# Patient Record
Sex: FEMALE | Race: WHITE | Employment: UNEMPLOYED | URBAN - NONMETROPOLITAN AREA
[De-identification: names, ages, dates, MRNs, and addresses within clinical notes are randomized per-mention and may not be internally consistent; named-entity substitution may affect disease eponyms.]

---

## 2017-02-17 ENCOUNTER — OFFICE VISIT (OUTPATIENT)
Dept: FAMILY MEDICINE CLINIC | Age: 37
End: 2017-02-17

## 2017-02-17 VITALS
BODY MASS INDEX: 43.87 KG/M2 | SYSTOLIC BLOOD PRESSURE: 138 MMHG | WEIGHT: 257 LBS | DIASTOLIC BLOOD PRESSURE: 78 MMHG | OXYGEN SATURATION: 98 % | HEART RATE: 81 BPM | HEIGHT: 64 IN

## 2017-02-17 DIAGNOSIS — F33.1 MDD (MAJOR DEPRESSIVE DISORDER), RECURRENT EPISODE, MODERATE (HCC): Primary | Chronic | ICD-10-CM

## 2017-02-17 PROCEDURE — 99213 OFFICE O/P EST LOW 20 MIN: CPT | Performed by: FAMILY MEDICINE

## 2017-02-18 RX ORDER — OLANZAPINE 5 MG/1
2.5 TABLET ORAL NIGHTLY
Qty: 30 TABLET | Refills: 1 | Status: SHIPPED
Start: 2017-02-18 | End: 2017-05-08 | Stop reason: ALTCHOICE

## 2017-03-31 ENCOUNTER — OFFICE VISIT (OUTPATIENT)
Dept: FAMILY MEDICINE CLINIC | Age: 37
End: 2017-03-31

## 2017-03-31 VITALS
DIASTOLIC BLOOD PRESSURE: 88 MMHG | OXYGEN SATURATION: 98 % | HEART RATE: 82 BPM | BODY MASS INDEX: 43.71 KG/M2 | HEIGHT: 64 IN | SYSTOLIC BLOOD PRESSURE: 122 MMHG | WEIGHT: 256 LBS

## 2017-03-31 DIAGNOSIS — F33.1 MDD (MAJOR DEPRESSIVE DISORDER), RECURRENT EPISODE, MODERATE (HCC): Primary | Chronic | ICD-10-CM

## 2017-03-31 PROCEDURE — 99214 OFFICE O/P EST MOD 30 MIN: CPT | Performed by: FAMILY MEDICINE

## 2017-03-31 PROCEDURE — 36415 COLL VENOUS BLD VENIPUNCTURE: CPT | Performed by: FAMILY MEDICINE

## 2017-03-31 RX ORDER — OLANZAPINE 2.5 MG/1
1.25 TABLET ORAL NIGHTLY
Qty: 30 TABLET | Refills: 1 | Status: SHIPPED | OUTPATIENT
Start: 2017-03-31 | End: 2017-05-08 | Stop reason: ALTCHOICE

## 2017-04-01 LAB
LITHIUM DOSE AMOUNT: ABNORMAL
LITHIUM LEVEL: 0.5 MMOL/L (ref 0.6–1.2)

## 2017-04-27 DIAGNOSIS — I10 ESSENTIAL HYPERTENSION: ICD-10-CM

## 2017-04-27 DIAGNOSIS — F33.1 MDD (MAJOR DEPRESSIVE DISORDER), RECURRENT EPISODE, MODERATE (HCC): ICD-10-CM

## 2017-04-28 DIAGNOSIS — F33.1 MDD (MAJOR DEPRESSIVE DISORDER), RECURRENT EPISODE, MODERATE (HCC): ICD-10-CM

## 2017-04-28 RX ORDER — CLONAZEPAM 0.5 MG/1
TABLET ORAL
Qty: 15 TABLET | Refills: 2 | Status: SHIPPED | OUTPATIENT
Start: 2017-04-28 | End: 2017-07-07 | Stop reason: SDUPTHER

## 2017-04-28 RX ORDER — METOPROLOL SUCCINATE 100 MG/1
TABLET, EXTENDED RELEASE ORAL
Qty: 30 TABLET | Refills: 2 | Status: SHIPPED | OUTPATIENT
Start: 2017-04-28 | End: 2017-07-07 | Stop reason: SDUPTHER

## 2017-04-28 RX ORDER — LAMOTRIGINE 200 MG/1
TABLET ORAL
Qty: 60 TABLET | Refills: 0 | Status: SHIPPED | OUTPATIENT
Start: 2017-04-28 | End: 2017-06-01 | Stop reason: SDUPTHER

## 2017-04-28 RX ORDER — ESCITALOPRAM OXALATE 20 MG/1
TABLET ORAL
Qty: 30 TABLET | Refills: 0 | Status: SHIPPED | OUTPATIENT
Start: 2017-04-28 | End: 2017-06-01 | Stop reason: SDUPTHER

## 2017-05-03 ENCOUNTER — NURSE ONLY (OUTPATIENT)
Dept: FAMILY MEDICINE CLINIC | Age: 37
End: 2017-05-03

## 2017-05-03 ENCOUNTER — TELEPHONE (OUTPATIENT)
Dept: FAMILY MEDICINE CLINIC | Age: 37
End: 2017-05-03

## 2017-05-03 DIAGNOSIS — F33.1 MDD (MAJOR DEPRESSIVE DISORDER), RECURRENT EPISODE, MODERATE (HCC): Primary | Chronic | ICD-10-CM

## 2017-05-03 LAB
ALBUMIN SERPL-MCNC: 4.4 G/DL (ref 3.4–5)
ANION GAP SERPL CALCULATED.3IONS-SCNC: 18 MMOL/L (ref 3–16)
BUN BLDV-MCNC: 11 MG/DL (ref 7–20)
CALCIUM SERPL-MCNC: 9.1 MG/DL (ref 8.3–10.6)
CHLORIDE BLD-SCNC: 99 MMOL/L (ref 99–110)
CO2: 23 MMOL/L (ref 21–32)
CREAT SERPL-MCNC: 1 MG/DL (ref 0.6–1.1)
GFR AFRICAN AMERICAN: >60
GFR NON-AFRICAN AMERICAN: >60
GLUCOSE BLD-MCNC: 101 MG/DL (ref 70–99)
LITHIUM DOSE AMOUNT: NORMAL
LITHIUM LEVEL: 1.1 MMOL/L (ref 0.6–1.2)
PHOSPHORUS: 3.2 MG/DL (ref 2.5–4.9)
POTASSIUM SERPL-SCNC: 4.3 MMOL/L (ref 3.5–5.1)
SODIUM BLD-SCNC: 140 MMOL/L (ref 136–145)
T4 FREE: 1.1 NG/DL (ref 0.9–1.8)
TSH REFLEX: 4.3 UIU/ML (ref 0.27–4.2)

## 2017-05-03 PROCEDURE — 36415 COLL VENOUS BLD VENIPUNCTURE: CPT | Performed by: FAMILY MEDICINE

## 2017-05-07 DIAGNOSIS — F33.1 MDD (MAJOR DEPRESSIVE DISORDER), RECURRENT EPISODE, MODERATE (HCC): ICD-10-CM

## 2017-05-07 RX ORDER — LITHIUM CARBONATE 300 MG/1
1200 TABLET, FILM COATED, EXTENDED RELEASE ORAL DAILY
Qty: 120 TABLET | Refills: 5
Start: 2017-05-07 | End: 2017-06-16 | Stop reason: CLARIF

## 2017-05-17 ENCOUNTER — TELEPHONE (OUTPATIENT)
Dept: FAMILY MEDICINE CLINIC | Age: 37
End: 2017-05-17

## 2017-05-18 ENCOUNTER — OFFICE VISIT (OUTPATIENT)
Dept: FAMILY MEDICINE CLINIC | Age: 37
End: 2017-05-18

## 2017-05-18 VITALS
OXYGEN SATURATION: 97 % | TEMPERATURE: 98 F | SYSTOLIC BLOOD PRESSURE: 112 MMHG | RESPIRATION RATE: 16 BRPM | BODY MASS INDEX: 42.74 KG/M2 | DIASTOLIC BLOOD PRESSURE: 92 MMHG | HEART RATE: 95 BPM | WEIGHT: 249 LBS

## 2017-05-18 DIAGNOSIS — T88.9XXA SIDE EFFECTS OF TREATMENT, INITIAL ENCOUNTER: Primary | ICD-10-CM

## 2017-05-18 PROCEDURE — 99213 OFFICE O/P EST LOW 20 MIN: CPT | Performed by: FAMILY MEDICINE

## 2017-05-18 ASSESSMENT — PATIENT HEALTH QUESTIONNAIRE - PHQ9
3. TROUBLE FALLING OR STAYING ASLEEP: 0
8. MOVING OR SPEAKING SO SLOWLY THAT OTHER PEOPLE COULD HAVE NOTICED. OR THE OPPOSITE, BEING SO FIGETY OR RESTLESS THAT YOU HAVE BEEN MOVING AROUND A LOT MORE THAN USUAL: 0
SUM OF ALL RESPONSES TO PHQ QUESTIONS 1-9: 5
1. LITTLE INTEREST OR PLEASURE IN DOING THINGS: 2
9. THOUGHTS THAT YOU WOULD BE BETTER OFF DEAD, OR OF HURTING YOURSELF: 0
SUM OF ALL RESPONSES TO PHQ9 QUESTIONS 1 & 2: 5
7. TROUBLE CONCENTRATING ON THINGS, SUCH AS READING THE NEWSPAPER OR WATCHING TELEVISION: 0
6. FEELING BAD ABOUT YOURSELF - OR THAT YOU ARE A FAILURE OR HAVE LET YOURSELF OR YOUR FAMILY DOWN: 0
5. POOR APPETITE OR OVEREATING: 0
4. FEELING TIRED OR HAVING LITTLE ENERGY: 0
2. FEELING DOWN, DEPRESSED OR HOPELESS: 3

## 2017-06-15 DIAGNOSIS — F33.1 MDD (MAJOR DEPRESSIVE DISORDER), RECURRENT EPISODE, MODERATE (HCC): ICD-10-CM

## 2017-06-15 RX ORDER — LITHIUM CARBONATE 300 MG/1
TABLET, FILM COATED, EXTENDED RELEASE ORAL
Qty: 90 TABLET | Refills: 2 | Status: SHIPPED | OUTPATIENT
Start: 2017-06-15 | End: 2017-06-22 | Stop reason: DRUGHIGH

## 2017-06-16 RX ORDER — LITHIUM CARBONATE 300 MG/1
TABLET, FILM COATED, EXTENDED RELEASE ORAL
Qty: 120 TABLET | Refills: 5 | Status: SHIPPED | OUTPATIENT
Start: 2017-06-16 | End: 2017-08-04 | Stop reason: SDUPTHER

## 2017-07-07 ENCOUNTER — OFFICE VISIT (OUTPATIENT)
Dept: FAMILY MEDICINE CLINIC | Age: 37
End: 2017-07-07

## 2017-07-07 VITALS
SYSTOLIC BLOOD PRESSURE: 124 MMHG | WEIGHT: 251 LBS | HEIGHT: 64 IN | BODY MASS INDEX: 42.85 KG/M2 | OXYGEN SATURATION: 97 % | DIASTOLIC BLOOD PRESSURE: 92 MMHG | HEART RATE: 93 BPM

## 2017-07-07 DIAGNOSIS — F33.1 MDD (MAJOR DEPRESSIVE DISORDER), RECURRENT EPISODE, MODERATE (HCC): Primary | Chronic | ICD-10-CM

## 2017-07-07 DIAGNOSIS — I10 ESSENTIAL HYPERTENSION: ICD-10-CM

## 2017-07-07 PROCEDURE — 99213 OFFICE O/P EST LOW 20 MIN: CPT | Performed by: FAMILY MEDICINE

## 2017-07-07 RX ORDER — CLONAZEPAM 0.5 MG/1
TABLET ORAL
Qty: 15 TABLET | Refills: 2 | Status: SHIPPED | OUTPATIENT
Start: 2017-07-07 | End: 2017-10-03 | Stop reason: SDUPTHER

## 2017-07-07 RX ORDER — METOPROLOL SUCCINATE 100 MG/1
TABLET, EXTENDED RELEASE ORAL
Qty: 30 TABLET | Refills: 2 | Status: SHIPPED | OUTPATIENT
Start: 2017-07-07 | End: 2017-11-09 | Stop reason: SDUPTHER

## 2017-07-07 RX ORDER — ARIPIPRAZOLE 10 MG/1
10 TABLET ORAL DAILY
Qty: 30 TABLET | Refills: 1 | Status: SHIPPED | OUTPATIENT
Start: 2017-07-07 | End: 2017-08-04 | Stop reason: SDUPTHER

## 2017-08-04 ENCOUNTER — OFFICE VISIT (OUTPATIENT)
Dept: FAMILY MEDICINE CLINIC | Age: 37
End: 2017-08-04

## 2017-08-04 VITALS
WEIGHT: 251 LBS | BODY MASS INDEX: 42.85 KG/M2 | SYSTOLIC BLOOD PRESSURE: 118 MMHG | HEIGHT: 64 IN | OXYGEN SATURATION: 94 % | HEART RATE: 86 BPM | DIASTOLIC BLOOD PRESSURE: 80 MMHG

## 2017-08-04 DIAGNOSIS — F33.1 MDD (MAJOR DEPRESSIVE DISORDER), RECURRENT EPISODE, MODERATE (HCC): Chronic | ICD-10-CM

## 2017-08-04 LAB
ALBUMIN SERPL-MCNC: 4.4 G/DL (ref 3.4–5)
ANION GAP SERPL CALCULATED.3IONS-SCNC: 14 MMOL/L (ref 3–16)
BUN BLDV-MCNC: 7 MG/DL (ref 7–20)
CALCIUM SERPL-MCNC: 10.1 MG/DL (ref 8.3–10.6)
CHLORIDE BLD-SCNC: 100 MMOL/L (ref 99–110)
CO2: 25 MMOL/L (ref 21–32)
CREAT SERPL-MCNC: 0.9 MG/DL (ref 0.6–1.1)
GFR AFRICAN AMERICAN: >60
GFR NON-AFRICAN AMERICAN: >60
GLUCOSE BLD-MCNC: 107 MG/DL (ref 70–99)
LITHIUM DOSE AMOUNT: NORMAL
LITHIUM LEVEL: 0.9 MMOL/L (ref 0.6–1.2)
PHOSPHORUS: 2.6 MG/DL (ref 2.5–4.9)
POTASSIUM SERPL-SCNC: 4.5 MMOL/L (ref 3.5–5.1)
SODIUM BLD-SCNC: 139 MMOL/L (ref 136–145)
TSH REFLEX: 3.05 UIU/ML (ref 0.27–4.2)

## 2017-08-04 PROCEDURE — 36415 COLL VENOUS BLD VENIPUNCTURE: CPT | Performed by: FAMILY MEDICINE

## 2017-08-04 PROCEDURE — 99213 OFFICE O/P EST LOW 20 MIN: CPT | Performed by: FAMILY MEDICINE

## 2017-08-04 RX ORDER — LITHIUM CARBONATE 300 MG/1
TABLET, FILM COATED, EXTENDED RELEASE ORAL
Qty: 90 TABLET | Refills: 3 | Status: SHIPPED | OUTPATIENT
Start: 2017-08-04 | End: 2018-02-09 | Stop reason: SDUPTHER

## 2017-08-04 RX ORDER — ESCITALOPRAM OXALATE 20 MG/1
TABLET ORAL
Qty: 30 TABLET | Refills: 3 | Status: SHIPPED | OUTPATIENT
Start: 2017-08-04 | End: 2018-02-09 | Stop reason: SDUPTHER

## 2017-08-04 RX ORDER — ARIPIPRAZOLE 10 MG/1
10 TABLET ORAL DAILY
Qty: 30 TABLET | Refills: 3 | Status: SHIPPED | OUTPATIENT
Start: 2017-08-04 | End: 2018-01-09 | Stop reason: SDUPTHER

## 2017-09-08 ENCOUNTER — OFFICE VISIT (OUTPATIENT)
Dept: FAMILY MEDICINE CLINIC | Age: 37
End: 2017-09-08

## 2017-09-08 VITALS
BODY MASS INDEX: 43.54 KG/M2 | SYSTOLIC BLOOD PRESSURE: 126 MMHG | HEIGHT: 64 IN | HEART RATE: 84 BPM | WEIGHT: 255 LBS | DIASTOLIC BLOOD PRESSURE: 86 MMHG | OXYGEN SATURATION: 96 %

## 2017-09-08 DIAGNOSIS — Z20.2 POSSIBLE EXPOSURE TO STD: ICD-10-CM

## 2017-09-08 DIAGNOSIS — R30.0 DYSURIA: ICD-10-CM

## 2017-09-08 DIAGNOSIS — F33.1 MDD (MAJOR DEPRESSIVE DISORDER), RECURRENT EPISODE, MODERATE (HCC): Primary | Chronic | ICD-10-CM

## 2017-09-08 LAB
BACTERIA: ABNORMAL /HPF
BILIRUBIN URINE: NEGATIVE
BLOOD, URINE: NEGATIVE
CLARITY: ABNORMAL
COLOR: YELLOW
EPITHELIAL CELLS, UA: 12 /HPF (ref 0–5)
GLUCOSE URINE: NEGATIVE MG/DL
HYALINE CASTS: 1 /LPF (ref 0–8)
KETONES, URINE: NEGATIVE MG/DL
LEUKOCYTE ESTERASE, URINE: ABNORMAL
MICROSCOPIC EXAMINATION: YES
NITRITE, URINE: NEGATIVE
PH UA: 6.5
PROTEIN UA: ABNORMAL MG/DL
RBC UA: 2 /HPF (ref 0–4)
SPECIFIC GRAVITY UA: 1.02
UROBILINOGEN, URINE: 0.2 E.U./DL
WBC UA: 20 /HPF (ref 0–5)

## 2017-09-08 PROCEDURE — 81001 URINALYSIS AUTO W/SCOPE: CPT | Performed by: FAMILY MEDICINE

## 2017-09-08 PROCEDURE — 99214 OFFICE O/P EST MOD 30 MIN: CPT | Performed by: FAMILY MEDICINE

## 2017-09-08 RX ORDER — LAMOTRIGINE 200 MG/1
TABLET ORAL
Qty: 60 TABLET | Refills: 2 | Status: SHIPPED | OUTPATIENT
Start: 2017-09-08 | End: 2018-01-09 | Stop reason: SDUPTHER

## 2017-09-10 LAB
ORGANISM: ABNORMAL
URINE CULTURE, ROUTINE: ABNORMAL

## 2017-09-11 LAB
C. TRACHOMATIS DNA ,URINE: NEGATIVE
HIV-1 AND HIV-2 ANTIBODIES: NORMAL
N. GONORRHOEAE DNA, URINE: NEGATIVE

## 2017-09-11 RX ORDER — CIPROFLOXACIN 250 MG/1
250 TABLET, FILM COATED ORAL 2 TIMES DAILY
Qty: 6 TABLET | Refills: 0 | Status: SHIPPED | OUTPATIENT
Start: 2017-09-11 | End: 2017-09-14

## 2017-09-20 ENCOUNTER — OFFICE VISIT (OUTPATIENT)
Dept: FAMILY MEDICINE CLINIC | Age: 37
End: 2017-09-20

## 2017-09-20 VITALS
RESPIRATION RATE: 16 BRPM | DIASTOLIC BLOOD PRESSURE: 84 MMHG | BODY MASS INDEX: 43.94 KG/M2 | WEIGHT: 256 LBS | HEART RATE: 86 BPM | SYSTOLIC BLOOD PRESSURE: 112 MMHG | OXYGEN SATURATION: 98 % | TEMPERATURE: 98.4 F

## 2017-09-20 DIAGNOSIS — B37.31 VULVOVAGINITIS DUE TO YEAST: Primary | ICD-10-CM

## 2017-09-20 PROCEDURE — 99213 OFFICE O/P EST LOW 20 MIN: CPT | Performed by: FAMILY MEDICINE

## 2017-09-20 RX ORDER — FLUCONAZOLE 150 MG/1
150 TABLET ORAL ONCE
Qty: 1 TABLET | Refills: 0 | Status: SHIPPED | OUTPATIENT
Start: 2017-09-20 | End: 2017-09-20

## 2017-09-30 ASSESSMENT — ENCOUNTER SYMPTOMS: GASTROINTESTINAL NEGATIVE: 1

## 2017-10-03 DIAGNOSIS — F33.1 MDD (MAJOR DEPRESSIVE DISORDER), RECURRENT EPISODE, MODERATE (HCC): Chronic | ICD-10-CM

## 2017-10-03 RX ORDER — CLONAZEPAM 0.5 MG/1
0.5 TABLET ORAL 2 TIMES DAILY PRN
Qty: 15 TABLET | Refills: 2 | Status: SHIPPED | OUTPATIENT
Start: 2017-10-03 | End: 2018-01-09 | Stop reason: SDUPTHER

## 2017-11-09 ENCOUNTER — OFFICE VISIT (OUTPATIENT)
Dept: FAMILY MEDICINE CLINIC | Age: 37
End: 2017-11-09

## 2017-11-09 VITALS
HEART RATE: 97 BPM | BODY MASS INDEX: 44.39 KG/M2 | HEIGHT: 64 IN | WEIGHT: 260 LBS | OXYGEN SATURATION: 97 % | SYSTOLIC BLOOD PRESSURE: 122 MMHG | DIASTOLIC BLOOD PRESSURE: 92 MMHG

## 2017-11-09 DIAGNOSIS — I10 ESSENTIAL HYPERTENSION: ICD-10-CM

## 2017-11-09 DIAGNOSIS — F33.1 MDD (MAJOR DEPRESSIVE DISORDER), RECURRENT EPISODE, MODERATE (HCC): Primary | Chronic | ICD-10-CM

## 2017-11-09 PROCEDURE — 99213 OFFICE O/P EST LOW 20 MIN: CPT | Performed by: FAMILY MEDICINE

## 2017-11-09 PROCEDURE — 1036F TOBACCO NON-USER: CPT | Performed by: FAMILY MEDICINE

## 2017-11-09 PROCEDURE — G8417 CALC BMI ABV UP PARAM F/U: HCPCS | Performed by: FAMILY MEDICINE

## 2017-11-09 PROCEDURE — G8484 FLU IMMUNIZE NO ADMIN: HCPCS | Performed by: FAMILY MEDICINE

## 2017-11-09 PROCEDURE — G8427 DOCREV CUR MEDS BY ELIG CLIN: HCPCS | Performed by: FAMILY MEDICINE

## 2017-11-09 RX ORDER — METOPROLOL SUCCINATE 100 MG/1
TABLET, EXTENDED RELEASE ORAL
Qty: 30 TABLET | Refills: 5 | Status: SHIPPED | OUTPATIENT
Start: 2017-11-09 | End: 2018-05-14 | Stop reason: SDUPTHER

## 2017-11-09 NOTE — PATIENT INSTRUCTIONS
Cut abilify in 1/2 for one month and then stop. Increase lithium to 4 a day and see if the depression gets better.

## 2017-11-13 ENCOUNTER — NURSE ONLY (OUTPATIENT)
Dept: FAMILY MEDICINE CLINIC | Age: 37
End: 2017-11-13

## 2018-02-09 ENCOUNTER — OFFICE VISIT (OUTPATIENT)
Dept: FAMILY MEDICINE CLINIC | Age: 38
End: 2018-02-09

## 2018-02-09 DIAGNOSIS — F33.1 MDD (MAJOR DEPRESSIVE DISORDER), RECURRENT EPISODE, MODERATE (HCC): Primary | Chronic | ICD-10-CM

## 2018-02-09 DIAGNOSIS — I10 ESSENTIAL HYPERTENSION: ICD-10-CM

## 2018-02-09 LAB
ALBUMIN SERPL-MCNC: 4.5 G/DL (ref 3.4–5)
ANION GAP SERPL CALCULATED.3IONS-SCNC: 18 MMOL/L (ref 3–16)
BUN BLDV-MCNC: 9 MG/DL (ref 7–20)
CALCIUM SERPL-MCNC: 9.6 MG/DL (ref 8.3–10.6)
CHLORIDE BLD-SCNC: 97 MMOL/L (ref 99–110)
CO2: 23 MMOL/L (ref 21–32)
CREAT SERPL-MCNC: 1.1 MG/DL (ref 0.6–1.1)
GFR AFRICAN AMERICAN: >60
GFR NON-AFRICAN AMERICAN: 56
GLUCOSE BLD-MCNC: 102 MG/DL (ref 70–99)
LITHIUM DOSE AMOUNT: NORMAL
LITHIUM LEVEL: 0.9 MMOL/L (ref 0.6–1.2)
PHOSPHORUS: 2.9 MG/DL (ref 2.5–4.9)
POTASSIUM SERPL-SCNC: 4.5 MMOL/L (ref 3.5–5.1)
SODIUM BLD-SCNC: 138 MMOL/L (ref 136–145)

## 2018-02-09 PROCEDURE — G8427 DOCREV CUR MEDS BY ELIG CLIN: HCPCS | Performed by: FAMILY MEDICINE

## 2018-02-09 PROCEDURE — 36415 COLL VENOUS BLD VENIPUNCTURE: CPT | Performed by: FAMILY MEDICINE

## 2018-02-09 PROCEDURE — 1036F TOBACCO NON-USER: CPT | Performed by: FAMILY MEDICINE

## 2018-02-09 PROCEDURE — G8417 CALC BMI ABV UP PARAM F/U: HCPCS | Performed by: FAMILY MEDICINE

## 2018-02-09 PROCEDURE — G8484 FLU IMMUNIZE NO ADMIN: HCPCS | Performed by: FAMILY MEDICINE

## 2018-02-09 PROCEDURE — 99214 OFFICE O/P EST MOD 30 MIN: CPT | Performed by: FAMILY MEDICINE

## 2018-02-09 RX ORDER — CLONAZEPAM 0.5 MG/1
TABLET ORAL
Qty: 15 TABLET | Refills: 3 | Status: SHIPPED | OUTPATIENT
Start: 2018-02-09 | End: 2018-07-17 | Stop reason: SDUPTHER

## 2018-02-09 RX ORDER — LAMOTRIGINE 200 MG/1
TABLET ORAL
Qty: 60 TABLET | Refills: 3 | Status: SHIPPED | OUTPATIENT
Start: 2018-02-09 | End: 2019-08-28

## 2018-02-09 RX ORDER — LITHIUM CARBONATE 300 MG/1
TABLET, FILM COATED, EXTENDED RELEASE ORAL
Qty: 120 TABLET | Refills: 3 | Status: SHIPPED | OUTPATIENT
Start: 2018-02-09 | End: 2019-01-11 | Stop reason: DRUGHIGH

## 2018-02-09 RX ORDER — ESCITALOPRAM OXALATE 20 MG/1
TABLET ORAL
Qty: 30 TABLET | Refills: 3 | Status: SHIPPED | OUTPATIENT
Start: 2018-02-09

## 2018-02-10 VITALS
HEART RATE: 100 BPM | BODY MASS INDEX: 43.64 KG/M2 | WEIGHT: 255.6 LBS | DIASTOLIC BLOOD PRESSURE: 86 MMHG | HEIGHT: 64 IN | OXYGEN SATURATION: 98 % | SYSTOLIC BLOOD PRESSURE: 124 MMHG

## 2018-04-04 ENCOUNTER — TELEPHONE (OUTPATIENT)
Dept: FAMILY MEDICINE CLINIC | Age: 38
End: 2018-04-04

## 2018-04-04 DIAGNOSIS — M25.551 PAIN OF RIGHT HIP JOINT: Primary | ICD-10-CM

## 2018-04-09 ENCOUNTER — OFFICE VISIT (OUTPATIENT)
Dept: ORTHOPEDIC SURGERY | Age: 38
End: 2018-04-09

## 2018-04-09 VITALS
BODY MASS INDEX: 43.62 KG/M2 | SYSTOLIC BLOOD PRESSURE: 116 MMHG | WEIGHT: 255.51 LBS | HEIGHT: 64 IN | DIASTOLIC BLOOD PRESSURE: 84 MMHG | HEART RATE: 91 BPM

## 2018-04-09 DIAGNOSIS — M54.5 CHRONIC RIGHT-SIDED LOW BACK PAIN, WITH SCIATICA PRESENCE UNSPECIFIED: Primary | ICD-10-CM

## 2018-04-09 DIAGNOSIS — G89.29 CHRONIC RIGHT-SIDED LOW BACK PAIN, WITH SCIATICA PRESENCE UNSPECIFIED: Primary | ICD-10-CM

## 2018-04-09 DIAGNOSIS — M70.61 TROCHANTERIC BURSITIS OF RIGHT HIP: ICD-10-CM

## 2018-04-09 DIAGNOSIS — M25.551 PAIN OF RIGHT HIP JOINT: ICD-10-CM

## 2018-04-09 PROCEDURE — 1036F TOBACCO NON-USER: CPT | Performed by: ORTHOPAEDIC SURGERY

## 2018-04-09 PROCEDURE — G8417 CALC BMI ABV UP PARAM F/U: HCPCS | Performed by: ORTHOPAEDIC SURGERY

## 2018-04-09 PROCEDURE — 99213 OFFICE O/P EST LOW 20 MIN: CPT | Performed by: ORTHOPAEDIC SURGERY

## 2018-04-09 PROCEDURE — G8427 DOCREV CUR MEDS BY ELIG CLIN: HCPCS | Performed by: ORTHOPAEDIC SURGERY

## 2018-04-09 RX ORDER — TESTOSTERONE CYPIONATE 200 MG/ML
INJECTION INTRAMUSCULAR
Qty: 30 ML | Refills: 0 | Status: SHIPPED | OUTPATIENT
Start: 2018-04-09 | End: 2018-07-16

## 2018-04-30 ENCOUNTER — HOSPITAL ENCOUNTER (OUTPATIENT)
Dept: PHYSICAL THERAPY | Age: 38
Discharge: HOME | End: 2018-04-30
Admitting: ORTHOPAEDIC SURGERY

## 2018-05-01 ENCOUNTER — HOSPITAL ENCOUNTER (OUTPATIENT)
Dept: PHYSICAL THERAPY | Age: 38
Discharge: HOME | End: 2018-05-31
Attending: ORTHOPAEDIC SURGERY | Admitting: ORTHOPAEDIC SURGERY

## 2018-05-07 ENCOUNTER — HOSPITAL ENCOUNTER (OUTPATIENT)
Dept: PHYSICAL THERAPY | Age: 38
Discharge: HOME | End: 2018-05-08
Admitting: ORTHOPAEDIC SURGERY

## 2018-05-09 ENCOUNTER — HOSPITAL ENCOUNTER (OUTPATIENT)
Dept: PHYSICAL THERAPY | Age: 38
Discharge: HOME | End: 2018-05-10
Admitting: ORTHOPAEDIC SURGERY

## 2018-05-15 ENCOUNTER — TELEPHONE (OUTPATIENT)
Dept: FAMILY MEDICINE CLINIC | Age: 38
End: 2018-05-15

## 2018-05-16 ENCOUNTER — HOSPITAL ENCOUNTER (OUTPATIENT)
Dept: PHYSICAL THERAPY | Age: 38
Discharge: HOME | End: 2018-05-17
Admitting: ORTHOPAEDIC SURGERY

## 2018-05-21 ENCOUNTER — OFFICE VISIT (OUTPATIENT)
Dept: ORTHOPEDIC SURGERY | Age: 38
End: 2018-05-21

## 2018-05-21 VITALS
WEIGHT: 255.51 LBS | BODY MASS INDEX: 43.62 KG/M2 | DIASTOLIC BLOOD PRESSURE: 78 MMHG | HEART RATE: 86 BPM | SYSTOLIC BLOOD PRESSURE: 121 MMHG | HEIGHT: 64 IN

## 2018-05-21 DIAGNOSIS — M70.61 TROCHANTERIC BURSITIS OF RIGHT HIP: Primary | ICD-10-CM

## 2018-05-21 PROCEDURE — 20610 DRAIN/INJ JOINT/BURSA W/O US: CPT | Performed by: ORTHOPAEDIC SURGERY

## 2018-05-21 PROCEDURE — 1036F TOBACCO NON-USER: CPT | Performed by: ORTHOPAEDIC SURGERY

## 2018-05-21 PROCEDURE — G8417 CALC BMI ABV UP PARAM F/U: HCPCS | Performed by: ORTHOPAEDIC SURGERY

## 2018-05-21 PROCEDURE — G8427 DOCREV CUR MEDS BY ELIG CLIN: HCPCS | Performed by: ORTHOPAEDIC SURGERY

## 2018-05-21 PROCEDURE — 99213 OFFICE O/P EST LOW 20 MIN: CPT | Performed by: ORTHOPAEDIC SURGERY

## 2018-05-22 ENCOUNTER — HOSPITAL ENCOUNTER (OUTPATIENT)
Dept: PHYSICAL THERAPY | Age: 38
Discharge: HOME | End: 2018-05-23
Admitting: ORTHOPAEDIC SURGERY

## 2018-05-23 ENCOUNTER — HOSPITAL ENCOUNTER (OUTPATIENT)
Dept: PHYSICAL THERAPY | Age: 38
Discharge: HOME | End: 2018-05-24
Admitting: ORTHOPAEDIC SURGERY

## 2018-05-29 ENCOUNTER — HOSPITAL ENCOUNTER (OUTPATIENT)
Dept: PHYSICAL THERAPY | Age: 38
Discharge: HOME | End: 2018-05-30
Admitting: ORTHOPAEDIC SURGERY

## 2018-06-01 ENCOUNTER — HOSPITAL ENCOUNTER (OUTPATIENT)
Dept: PHYSICAL THERAPY | Age: 38
Discharge: HOME | End: 2018-06-30
Attending: ORTHOPAEDIC SURGERY | Admitting: ORTHOPAEDIC SURGERY

## 2018-06-04 ENCOUNTER — OFFICE VISIT (OUTPATIENT)
Dept: ORTHOPEDIC SURGERY | Age: 38
End: 2018-06-04

## 2018-06-04 VITALS
WEIGHT: 255.51 LBS | BODY MASS INDEX: 43.62 KG/M2 | SYSTOLIC BLOOD PRESSURE: 121 MMHG | HEIGHT: 64 IN | DIASTOLIC BLOOD PRESSURE: 83 MMHG | HEART RATE: 74 BPM

## 2018-06-04 DIAGNOSIS — M70.61 TROCHANTERIC BURSITIS OF RIGHT HIP: Primary | ICD-10-CM

## 2018-06-04 PROCEDURE — 1036F TOBACCO NON-USER: CPT | Performed by: ORTHOPAEDIC SURGERY

## 2018-06-04 PROCEDURE — G8427 DOCREV CUR MEDS BY ELIG CLIN: HCPCS | Performed by: ORTHOPAEDIC SURGERY

## 2018-06-04 PROCEDURE — 99212 OFFICE O/P EST SF 10 MIN: CPT | Performed by: ORTHOPAEDIC SURGERY

## 2018-06-04 PROCEDURE — G8417 CALC BMI ABV UP PARAM F/U: HCPCS | Performed by: ORTHOPAEDIC SURGERY

## 2018-06-05 ENCOUNTER — HOSPITAL ENCOUNTER (OUTPATIENT)
Dept: PHYSICAL THERAPY | Age: 38
Discharge: HOME | End: 2018-06-06
Admitting: ORTHOPAEDIC SURGERY

## 2018-06-11 DIAGNOSIS — I10 ESSENTIAL HYPERTENSION: ICD-10-CM

## 2018-06-11 RX ORDER — METOPROLOL SUCCINATE 100 MG/1
TABLET, EXTENDED RELEASE ORAL
Qty: 30 TABLET | Refills: 0 | Status: SHIPPED | OUTPATIENT
Start: 2018-06-11 | End: 2018-06-15 | Stop reason: SDUPTHER

## 2018-06-15 ENCOUNTER — OFFICE VISIT (OUTPATIENT)
Dept: FAMILY MEDICINE CLINIC | Age: 38
End: 2018-06-15

## 2018-06-15 VITALS
HEIGHT: 64 IN | OXYGEN SATURATION: 99 % | HEART RATE: 65 BPM | DIASTOLIC BLOOD PRESSURE: 84 MMHG | WEIGHT: 255 LBS | BODY MASS INDEX: 43.54 KG/M2 | SYSTOLIC BLOOD PRESSURE: 110 MMHG

## 2018-06-15 DIAGNOSIS — L70.0 ACNE VULGARIS: ICD-10-CM

## 2018-06-15 DIAGNOSIS — I10 ESSENTIAL HYPERTENSION: Primary | ICD-10-CM

## 2018-06-15 PROCEDURE — 1036F TOBACCO NON-USER: CPT | Performed by: FAMILY MEDICINE

## 2018-06-15 PROCEDURE — G8427 DOCREV CUR MEDS BY ELIG CLIN: HCPCS | Performed by: FAMILY MEDICINE

## 2018-06-15 PROCEDURE — 99213 OFFICE O/P EST LOW 20 MIN: CPT | Performed by: FAMILY MEDICINE

## 2018-06-15 PROCEDURE — G8417 CALC BMI ABV UP PARAM F/U: HCPCS | Performed by: FAMILY MEDICINE

## 2018-06-15 RX ORDER — PRAZOSIN HYDROCHLORIDE 2 MG/1
2 CAPSULE ORAL NIGHTLY
COMMUNITY

## 2018-06-15 RX ORDER — METOPROLOL SUCCINATE 100 MG/1
TABLET, EXTENDED RELEASE ORAL
Qty: 90 TABLET | Refills: 1 | Status: SHIPPED | OUTPATIENT
Start: 2018-06-15 | End: 2018-12-27 | Stop reason: SDUPTHER

## 2018-06-15 RX ORDER — TRETINOIN 0.5 MG/G
CREAM TOPICAL
Qty: 20 G | Refills: 2 | Status: SHIPPED | OUTPATIENT
Start: 2018-06-15 | End: 2018-07-15

## 2018-06-15 ASSESSMENT — PATIENT HEALTH QUESTIONNAIRE - PHQ9
10. IF YOU CHECKED OFF ANY PROBLEMS, HOW DIFFICULT HAVE THESE PROBLEMS MADE IT FOR YOU TO DO YOUR WORK, TAKE CARE OF THINGS AT HOME, OR GET ALONG WITH OTHER PEOPLE: 1
SUM OF ALL RESPONSES TO PHQ9 QUESTIONS 1 & 2: 3
5. POOR APPETITE OR OVEREATING: 0
1. LITTLE INTEREST OR PLEASURE IN DOING THINGS: 1
3. TROUBLE FALLING OR STAYING ASLEEP: 0
4. FEELING TIRED OR HAVING LITTLE ENERGY: 3
SUM OF ALL RESPONSES TO PHQ QUESTIONS 1-9: 6
9. THOUGHTS THAT YOU WOULD BE BETTER OFF DEAD, OR OF HURTING YOURSELF: 0
7. TROUBLE CONCENTRATING ON THINGS, SUCH AS READING THE NEWSPAPER OR WATCHING TELEVISION: 0
8. MOVING OR SPEAKING SO SLOWLY THAT OTHER PEOPLE COULD HAVE NOTICED. OR THE OPPOSITE, BEING SO FIGETY OR RESTLESS THAT YOU HAVE BEEN MOVING AROUND A LOT MORE THAN USUAL: 0
2. FEELING DOWN, DEPRESSED OR HOPELESS: 2
6. FEELING BAD ABOUT YOURSELF - OR THAT YOU ARE A FAILURE OR HAVE LET YOURSELF OR YOUR FAMILY DOWN: 0

## 2018-06-16 ASSESSMENT — ENCOUNTER SYMPTOMS: RESPIRATORY NEGATIVE: 1

## 2018-06-18 ENCOUNTER — OFFICE VISIT (OUTPATIENT)
Dept: ORTHOPEDIC SURGERY | Age: 38
End: 2018-06-18

## 2018-06-18 ENCOUNTER — PRE-EVALUATION (OUTPATIENT)
Dept: ORTHOPEDIC SURGERY | Age: 38
End: 2018-06-18

## 2018-06-18 VITALS
HEIGHT: 65 IN | DIASTOLIC BLOOD PRESSURE: 72 MMHG | SYSTOLIC BLOOD PRESSURE: 139 MMHG | WEIGHT: 260 LBS | HEART RATE: 68 BPM | BODY MASS INDEX: 43.32 KG/M2

## 2018-06-18 DIAGNOSIS — M25.551 RIGHT HIP PAIN: Primary | ICD-10-CM

## 2018-06-18 DIAGNOSIS — M76.891 HIP ABDUCTOR TENDINITIS, RIGHT: ICD-10-CM

## 2018-06-18 DIAGNOSIS — M70.61 GREATER TROCHANTERIC BURSITIS OF RIGHT HIP: ICD-10-CM

## 2018-06-18 PROCEDURE — APPNB30 APP NON BILLABLE TIME 0-30 MINS: Performed by: PHYSICIAN ASSISTANT

## 2018-06-18 PROCEDURE — G8427 DOCREV CUR MEDS BY ELIG CLIN: HCPCS | Performed by: ORTHOPAEDIC SURGERY

## 2018-06-18 PROCEDURE — 99213 OFFICE O/P EST LOW 20 MIN: CPT | Performed by: ORTHOPAEDIC SURGERY

## 2018-06-18 PROCEDURE — G8417 CALC BMI ABV UP PARAM F/U: HCPCS | Performed by: ORTHOPAEDIC SURGERY

## 2018-06-18 PROCEDURE — 1036F TOBACCO NON-USER: CPT | Performed by: ORTHOPAEDIC SURGERY

## 2018-07-01 ENCOUNTER — HOSPITAL ENCOUNTER (OUTPATIENT)
Dept: PHYSICAL THERAPY | Age: 38
Discharge: HOME | End: 2018-07-01
Attending: ORTHOPAEDIC SURGERY | Admitting: ORTHOPAEDIC SURGERY

## 2018-07-16 ENCOUNTER — APPOINTMENT (OUTPATIENT)
Dept: GENERAL RADIOLOGY | Age: 38
End: 2018-07-16
Payer: COMMERCIAL

## 2018-07-16 ENCOUNTER — HOSPITAL ENCOUNTER (EMERGENCY)
Age: 38
Discharge: HOME | End: 2018-07-16
Attending: EMERGENCY MEDICINE
Payer: COMMERCIAL

## 2018-07-16 VITALS
RESPIRATION RATE: 16 BRPM | BODY MASS INDEX: 42.49 KG/M2 | DIASTOLIC BLOOD PRESSURE: 68 MMHG | TEMPERATURE: 98.2 F | WEIGHT: 255 LBS | OXYGEN SATURATION: 99 % | HEART RATE: 94 BPM | SYSTOLIC BLOOD PRESSURE: 118 MMHG | HEIGHT: 65 IN

## 2018-07-16 DIAGNOSIS — F41.0 PANIC ATTACKS: ICD-10-CM

## 2018-07-16 DIAGNOSIS — R42 DIZZINESS: Primary | ICD-10-CM

## 2018-07-16 DIAGNOSIS — F33.1 MDD (MAJOR DEPRESSIVE DISORDER), RECURRENT EPISODE, MODERATE (HCC): Chronic | ICD-10-CM

## 2018-07-16 LAB
A/G RATIO: 1.1 (ref 1.1–2.2)
ALBUMIN SERPL-MCNC: 4.2 G/DL (ref 3.4–5)
ALP BLD-CCNC: 68 U/L (ref 40–129)
ALT SERPL-CCNC: 14 U/L (ref 10–40)
ANION GAP SERPL CALCULATED.3IONS-SCNC: 15 MMOL/L (ref 3–16)
AST SERPL-CCNC: 13 U/L (ref 15–37)
BASOPHILS ABSOLUTE: 0 K/UL (ref 0–0.2)
BASOPHILS RELATIVE PERCENT: 0.6 %
BILIRUB SERPL-MCNC: <0.2 MG/DL (ref 0–1)
BUN BLDV-MCNC: 9 MG/DL (ref 7–20)
CALCIUM SERPL-MCNC: 9.7 MG/DL (ref 8.3–10.6)
CHLORIDE BLD-SCNC: 102 MMOL/L (ref 99–110)
CO2: 25 MMOL/L (ref 21–32)
CREAT SERPL-MCNC: 0.8 MG/DL (ref 0.6–1.1)
EOSINOPHILS ABSOLUTE: 0.1 K/UL (ref 0–0.6)
EOSINOPHILS RELATIVE PERCENT: 1.2 %
GFR AFRICAN AMERICAN: >60
GFR NON-AFRICAN AMERICAN: >60
GLOBULIN: 3.7 G/DL
GLUCOSE BLD-MCNC: 115 MG/DL (ref 70–99)
HCG QUALITATIVE: NEGATIVE
HCT VFR BLD CALC: 44.7 % (ref 36–48)
HEMOGLOBIN: 14.6 G/DL (ref 12–16)
LYMPHOCYTES ABSOLUTE: 1.3 K/UL (ref 1–5.1)
LYMPHOCYTES RELATIVE PERCENT: 18.5 %
MCH RBC QN AUTO: 27.6 PG (ref 26–34)
MCHC RBC AUTO-ENTMCNC: 32.6 G/DL (ref 31–36)
MCV RBC AUTO: 84.8 FL (ref 80–100)
MONOCYTES ABSOLUTE: 0.4 K/UL (ref 0–1.3)
MONOCYTES RELATIVE PERCENT: 5.2 %
NEUTROPHILS ABSOLUTE: 5.4 K/UL (ref 1.7–7.7)
NEUTROPHILS RELATIVE PERCENT: 74.5 %
PDW BLD-RTO: 13.5 % (ref 12.4–15.4)
PLATELET # BLD: 325 K/UL (ref 135–450)
PMV BLD AUTO: 7.1 FL (ref 5–10.5)
POTASSIUM REFLEX MAGNESIUM: 3.7 MMOL/L (ref 3.5–5.1)
RBC # BLD: 5.27 M/UL (ref 4–5.2)
SODIUM BLD-SCNC: 142 MMOL/L (ref 136–145)
TOTAL PROTEIN: 7.9 G/DL (ref 6.4–8.2)
TROPONIN: <0.01 NG/ML
WBC # BLD: 7.2 K/UL (ref 4–11)

## 2018-07-16 PROCEDURE — 85025 COMPLETE CBC W/AUTO DIFF WBC: CPT

## 2018-07-16 PROCEDURE — 36415 COLL VENOUS BLD VENIPUNCTURE: CPT

## 2018-07-16 PROCEDURE — 84703 CHORIONIC GONADOTROPIN ASSAY: CPT

## 2018-07-16 PROCEDURE — 2580000003 HC RX 258: Performed by: PHYSICIAN ASSISTANT

## 2018-07-16 PROCEDURE — 80053 COMPREHEN METABOLIC PANEL: CPT

## 2018-07-16 PROCEDURE — 93005 ELECTROCARDIOGRAM TRACING: CPT | Performed by: PHYSICIAN ASSISTANT

## 2018-07-16 PROCEDURE — 71046 X-RAY EXAM CHEST 2 VIEWS: CPT

## 2018-07-16 PROCEDURE — 6370000000 HC RX 637 (ALT 250 FOR IP): Performed by: PHYSICIAN ASSISTANT

## 2018-07-16 PROCEDURE — 99284 EMERGENCY DEPT VISIT MOD MDM: CPT

## 2018-07-16 PROCEDURE — 84484 ASSAY OF TROPONIN QUANT: CPT

## 2018-07-16 RX ORDER — CLONAZEPAM 0.5 MG/1
TABLET ORAL
Qty: 15 TABLET | Refills: 0 | OUTPATIENT
Start: 2018-07-16

## 2018-07-16 RX ORDER — RISPERIDONE 0.5 MG/1
0.5 TABLET, FILM COATED ORAL EVERY MORNING
COMMUNITY
End: 2019-01-11 | Stop reason: ALTCHOICE

## 2018-07-16 RX ORDER — 0.9 % SODIUM CHLORIDE 0.9 %
1000 INTRAVENOUS SOLUTION INTRAVENOUS ONCE
Status: COMPLETED | OUTPATIENT
Start: 2018-07-16 | End: 2018-07-16

## 2018-07-16 RX ORDER — RISPERIDONE 1 MG/1
1.5 TABLET, FILM COATED ORAL NIGHTLY
COMMUNITY
End: 2019-01-11 | Stop reason: ALTCHOICE

## 2018-07-16 RX ORDER — HYDROXYZINE PAMOATE 25 MG/1
25 CAPSULE ORAL ONCE
Status: COMPLETED | OUTPATIENT
Start: 2018-07-16 | End: 2018-07-16

## 2018-07-16 RX ADMIN — HYDROXYZINE PAMOATE 25 MG: 25 CAPSULE ORAL at 18:54

## 2018-07-16 RX ADMIN — SODIUM CHLORIDE 1000 ML: 9 INJECTION, SOLUTION INTRAVENOUS at 18:54

## 2018-07-16 ASSESSMENT — ENCOUNTER SYMPTOMS
BACK PAIN: 0
SHORTNESS OF BREATH: 0
VOMITING: 0
ABDOMINAL PAIN: 0
VISUAL CHANGE: 0
COUGH: 0

## 2018-07-16 NOTE — ED NOTES
Patient and patient son given discharge instructions, instructions for f/u with PMD and verbalized understanding.      Benny Asif RN  07/16/18 [id number]

## 2018-07-16 NOTE — ED PROVIDER NOTES
weakness, numbness and headaches. Psychiatric/Behavioral: Negative for confusion. The patient is nervous/anxious. All other systems reviewed and are negative. PAST MEDICAL HISTORY   has a past medical history of Bipolar disorder ([address] Cerebral palsy ([facility] [address] Depression; and Hypertension. PAST SURGICAL HISTORY   has a past surgical history that includes Foot surgery; Leg Surgery; and knee surgery (Left, 6/12/15). FAMILY HISTORY  family history is not on file. SOCIAL HISTORY   reports that she has never smoked. She has never used smokeless tobacco. She reports that she does not drink alcohol or use drugs. HOME MEDICATIONS     Prior to Admission medications    Medication Sig Start Date End Date Taking?  Authorizing Provider   risperiDONE (RISPERDAL) 0.5 MG tablet Take 0.5 mg by mouth every morning   Yes Historical Provider, MD   risperiDONE (RISPERDAL) 1 MG tablet Take 1.5 mg by mouth nightly   Yes Historical Provider, MD   prazosin (MINIPRESS) 2 MG capsule Take 4 mg by mouth nightly     Historical Provider, MD   metoprolol succinate (TOPROL XL) 100 MG extended release tablet TAKE 1 TABLET BY MOUTH DAILY 6/15/18   Pal Snyder MD   clonazePAM (KLONOPIN) 0.5 MG tablet TAKE 1 TABLET BY MOUTH 2 TIMES DAILY AS NEEDED FOR ANXIETY, MAY REFILLMONTHLY. 2/9/18 3/9/18  Pal Snyder MD   escitalopram (LEXAPRO) 20 MG tablet TAKE ONE TABLET BY MOUTH DAILY  Patient taking differently: Take 20 mg by mouth daily TAKE ONE TABLET BY MOUTH DAILY 2/9/18   Pal Snyder MD   lamoTRIgine (LAMICTAL) 200 MG tablet TAKE ONE TABLET BY MOUTH 2 TIMES DAILY  Patient taking differently: 200 mg daily TAKE ONE TABLET BY MOUTH 2 TIMES DAILY 2/9/18   Pal Snyder MD   lithium (LITHOBID) 300 MG extended release tablet Take 4 tablets at night  Patient taking differently: 450 mg daily Take 4 tablets at night 2/9/18   Pal Snyder MD        ALLERGIES  is allergic to sulfa antibiotics; amoxicillin; ultram [tramadol]; and vicodin [hydrocodone-acetaminophen]. /83   Pulse 102   Temp 98.2 °F (36.8 °C) (Oral)   Resp 16   Ht 5' 5\" (1.651 m)   Wt 255 lb (115.7 kg)   SpO2 99%   BMI 42.43 kg/m²     Physical Exam   Constitutional: She is oriented to person, place, and time. She appears well-developed and well-nourished. HENT:   Head: Normocephalic and atraumatic. Mouth/Throat: Oropharynx is clear and moist. No oropharyngeal exudate, posterior oropharyngeal edema or posterior oropharyngeal erythema. Eyes: Conjunctivae and EOM are normal. Pupils are equal, round, and reactive to light. Neck: Normal range of motion. Neck supple. No JVD present. Cardiovascular: Normal rate, regular rhythm and intact distal pulses. Pulmonary/Chest: Effort normal and breath sounds normal. No respiratory distress. She has no wheezes. She has no rales. Abdominal: Soft. Bowel sounds are normal. She exhibits no distension. There is no tenderness. There is no rigidity, no rebound and no guarding. Musculoskeletal: Normal range of motion. She exhibits no edema. Neurological: She is alert and oriented to person, place, and time. She has normal strength and normal reflexes. No cranial nerve deficit or sensory deficit. She exhibits normal muscle tone. She displays a negative Romberg sign. Coordination and gait normal. GCS eye subscore is 4. GCS verbal subscore is 5. GCS motor subscore is 6. Reflex Scores:       Tricep reflexes are 2+ on the right side and 2+ on the left side. Bicep reflexes are 2+ on the right side and 2+ on the left side. Brachioradialis reflexes are 2+ on the right side and 2+ on the left side. Patellar reflexes are 2+ on the right side and 2+ on the left side. Achilles reflexes are 2+ on the right side and 2+ on the left side. Cranial facial musculature and sensation are intact the pt has no nuchal rigidity or meningismus. Pt has intact finger to nose.  Pt walks in the room, no ataxia. Extends upper extremities supinated without pronation or drift. Skin: Skin is warm and dry. No rash noted. Psychiatric: Her behavior is normal. Her mood appears anxious. Vitals reviewed.       Procedures    MDM  Number of Diagnoses or Management Options  Dizziness:   Panic attacks:      Amount and/or Complexity of Data Reviewed  Clinical lab tests: ordered and reviewed  Tests in the radiology section of CPT®: ordered and reviewed  Review and summarize past medical records: yes  Discuss the patient with other providers: yes  Independent visualization of images, tracings, or specimens: yes    Patient Progress  Patient progress: stable    Results for orders placed or performed during the hospital encounter of 07/16/18   HCG Qualitative, Serum   Result Value Ref Range    hCG Qual Negative Detects HCG level >10 MIU/mL   Comprehensive Metabolic Panel w/ Reflex to MG   Result Value Ref Range    Sodium 142 136 - 145 mmol/L    Potassium reflex Magnesium 3.7 3.5 - 5.1 mmol/L    Chloride 102 99 - 110 mmol/L    CO2 25 21 - 32 mmol/L    Anion Gap 15 3 - 16    Glucose 115 (H) 70 - 99 mg/dL    BUN 9 7 - 20 mg/dL    CREATININE 0.8 0.6 - 1.1 mg/dL    GFR Non-African American >60 >60    GFR African American >60 >60    Calcium 9.7 8.3 - 10.6 mg/dL    Total Protein 7.9 6.4 - 8.2 g/dL    Alb 4.2 3.4 - 5.0 g/dL    Albumin/Globulin Ratio 1.1 1.1 - 2.2    Total Bilirubin <0.2 0.0 - 1.0 mg/dL    Alkaline Phosphatase 68 40 - 129 U/L    ALT 14 10 - 40 U/L    AST 13 (L) 15 - 37 U/L    Globulin 3.7 g/dL   Troponin   Result Value Ref Range    Troponin <0.01 <0.01 ng/mL   CBC Auto Differential   Result Value Ref Range    WBC 7.2 4.0 - 11.0 K/uL    RBC 5.27 (H) 4.00 - 5.20 M/uL    Hemoglobin 14.6 12.0 - 16.0 g/dL    Hematocrit 44.7 36.0 - 48.0 %    MCV 84.8 80.0 - 100.0 fL    MCH 27.6 26.0 - 34.0 pg    MCHC 32.6 31.0 - 36.0 g/dL    RDW 13.5 12.4 - 15.4 %    Platelets 428 949 - 913 K/uL    MPV 7.1 5.0 - 10.5 fL reevaluation and to return to the emergency per for any worsening or change in her symptoms. She understands and agrees. I estimate there is LOW risk for PULMONARY EMBOLISM, ACUTE CORONARY SYNDROME, THORACIC AORTIC DISSECTION, STROKE, TRANSIENT ISCHEMIC ATTACK, HEMORRHAGE, OR CARDIAC ARRHYTHMIA, thus I consider the discharge disposition reasonable. Dr. Quin Landau spent face to face time with patient and agrees with above dx and treatment. Please note that this chart was generated using Dragon dictation software.  Although every effort was made to ensure the accuracy of this automated transcription, some errors in transcription may have occurred             Navya Tomlin PA-C  07/16/18 4881

## 2018-07-17 ENCOUNTER — OFFICE VISIT (OUTPATIENT)
Dept: FAMILY MEDICINE CLINIC | Age: 38
End: 2018-07-17

## 2018-07-17 VITALS
SYSTOLIC BLOOD PRESSURE: 122 MMHG | DIASTOLIC BLOOD PRESSURE: 78 MMHG | BODY MASS INDEX: 42.82 KG/M2 | HEART RATE: 99 BPM | HEIGHT: 65 IN | WEIGHT: 257 LBS | OXYGEN SATURATION: 96 %

## 2018-07-17 DIAGNOSIS — F33.1 MDD (MAJOR DEPRESSIVE DISORDER), RECURRENT EPISODE, MODERATE (HCC): Primary | Chronic | ICD-10-CM

## 2018-07-17 LAB
EKG ATRIAL RATE: 86 BPM
EKG DIAGNOSIS: NORMAL
EKG P AXIS: 60 DEGREES
EKG P-R INTERVAL: 210 MS
EKG Q-T INTERVAL: 358 MS
EKG QRS DURATION: 84 MS
EKG QTC CALCULATION (BAZETT): 428 MS
EKG R AXIS: 53 DEGREES
EKG T AXIS: 35 DEGREES
EKG VENTRICULAR RATE: 86 BPM

## 2018-07-17 PROCEDURE — 99214 OFFICE O/P EST MOD 30 MIN: CPT | Performed by: FAMILY MEDICINE

## 2018-07-17 PROCEDURE — G8417 CALC BMI ABV UP PARAM F/U: HCPCS | Performed by: FAMILY MEDICINE

## 2018-07-17 PROCEDURE — 1036F TOBACCO NON-USER: CPT | Performed by: FAMILY MEDICINE

## 2018-07-17 PROCEDURE — 93010 ELECTROCARDIOGRAM REPORT: CPT | Performed by: INTERNAL MEDICINE

## 2018-07-17 PROCEDURE — G8427 DOCREV CUR MEDS BY ELIG CLIN: HCPCS | Performed by: FAMILY MEDICINE

## 2018-07-17 RX ORDER — CLONAZEPAM 0.5 MG/1
TABLET ORAL
Qty: 15 TABLET | Refills: 0 | Status: SHIPPED | OUTPATIENT
Start: 2018-07-17 | End: 2019-01-11 | Stop reason: ALTCHOICE

## 2018-07-18 NOTE — PATIENT INSTRUCTIONS
Call if problems    I would recommend seeing a psychiatrist and I will help you bridge over the next 3 months but I do not wish to be your psychiatrist/primary care physician psychiatrist

## 2018-08-10 ENCOUNTER — TELEPHONE (OUTPATIENT)
Dept: FAMILY MEDICINE CLINIC | Age: 38
End: 2018-08-10

## 2018-10-01 ENCOUNTER — TELEPHONE (OUTPATIENT)
Dept: ORTHOPEDIC SURGERY | Age: 38
End: 2018-10-01

## 2018-10-01 NOTE — TELEPHONE ENCOUNTER
SCANNED A NO RECORDS STATEMENT FOR [facility] MEDICAL RECORDS FROM 9/08-3/10 INTO [facility] FOR [facility].

## 2019-01-11 ENCOUNTER — OFFICE VISIT (OUTPATIENT)
Dept: FAMILY MEDICINE CLINIC | Age: 39
End: 2019-01-11
Payer: COMMERCIAL

## 2019-01-11 VITALS
DIASTOLIC BLOOD PRESSURE: 60 MMHG | BODY MASS INDEX: 42.82 KG/M2 | WEIGHT: 257 LBS | SYSTOLIC BLOOD PRESSURE: 100 MMHG | OXYGEN SATURATION: 97 % | HEIGHT: 65 IN | HEART RATE: 69 BPM

## 2019-01-11 DIAGNOSIS — F33.1 MDD (MAJOR DEPRESSIVE DISORDER), RECURRENT EPISODE, MODERATE (HCC): Chronic | ICD-10-CM

## 2019-01-11 DIAGNOSIS — I10 ESSENTIAL HYPERTENSION: ICD-10-CM

## 2019-01-11 DIAGNOSIS — F43.10 PTSD (POST-TRAUMATIC STRESS DISORDER): Chronic | ICD-10-CM

## 2019-01-11 DIAGNOSIS — G80.8 OTHER CEREBRAL PALSY (HCC): ICD-10-CM

## 2019-01-11 DIAGNOSIS — F31.9 BIPOLAR DISEASE, CHRONIC (HCC): Primary | ICD-10-CM

## 2019-01-11 LAB
A/G RATIO: 1.6 (ref 1.1–2.2)
ALBUMIN SERPL-MCNC: 4.5 G/DL (ref 3.4–5)
ALP BLD-CCNC: 69 U/L (ref 40–129)
ALT SERPL-CCNC: 13 U/L (ref 10–40)
ANION GAP SERPL CALCULATED.3IONS-SCNC: 13 MMOL/L (ref 3–16)
AST SERPL-CCNC: 13 U/L (ref 15–37)
BILIRUB SERPL-MCNC: <0.2 MG/DL (ref 0–1)
BUN BLDV-MCNC: 15 MG/DL (ref 7–20)
CALCIUM SERPL-MCNC: 9.6 MG/DL (ref 8.3–10.6)
CHLORIDE BLD-SCNC: 100 MMOL/L (ref 99–110)
CHOLESTEROL, TOTAL: 201 MG/DL (ref 0–199)
CO2: 24 MMOL/L (ref 21–32)
CREAT SERPL-MCNC: 1 MG/DL (ref 0.6–1.1)
GFR AFRICAN AMERICAN: >60
GFR NON-AFRICAN AMERICAN: >60
GLOBULIN: 2.8 G/DL
GLUCOSE BLD-MCNC: 123 MG/DL (ref 70–99)
HCT VFR BLD CALC: 41.6 % (ref 36–48)
HDLC SERPL-MCNC: 39 MG/DL (ref 40–60)
HEMOGLOBIN: 13.8 G/DL (ref 12–16)
LDL CHOLESTEROL CALCULATED: 138 MG/DL
LITHIUM DOSE AMOUNT: NORMAL
LITHIUM LEVEL: 1.1 MMOL/L (ref 0.6–1.2)
MCH RBC QN AUTO: 27.6 PG (ref 26–34)
MCHC RBC AUTO-ENTMCNC: 33.3 G/DL (ref 31–36)
MCV RBC AUTO: 83 FL (ref 80–100)
PDW BLD-RTO: 14.3 % (ref 12.4–15.4)
PLATELET # BLD: 341 K/UL (ref 135–450)
PMV BLD AUTO: 7.5 FL (ref 5–10.5)
POTASSIUM SERPL-SCNC: 4.2 MMOL/L (ref 3.5–5.1)
RBC # BLD: 5.01 M/UL (ref 4–5.2)
SODIUM BLD-SCNC: 137 MMOL/L (ref 136–145)
TOTAL PROTEIN: 7.3 G/DL (ref 6.4–8.2)
TRIGL SERPL-MCNC: 118 MG/DL (ref 0–150)
TSH REFLEX: 2.87 UIU/ML (ref 0.27–4.2)
VLDLC SERPL CALC-MCNC: 24 MG/DL
WBC # BLD: 7.1 K/UL (ref 4–11)

## 2019-01-11 PROCEDURE — G8484 FLU IMMUNIZE NO ADMIN: HCPCS | Performed by: FAMILY MEDICINE

## 2019-01-11 PROCEDURE — G8427 DOCREV CUR MEDS BY ELIG CLIN: HCPCS | Performed by: FAMILY MEDICINE

## 2019-01-11 PROCEDURE — 99214 OFFICE O/P EST MOD 30 MIN: CPT | Performed by: FAMILY MEDICINE

## 2019-01-11 PROCEDURE — 36415 COLL VENOUS BLD VENIPUNCTURE: CPT | Performed by: FAMILY MEDICINE

## 2019-01-11 PROCEDURE — G8417 CALC BMI ABV UP PARAM F/U: HCPCS | Performed by: FAMILY MEDICINE

## 2019-01-11 PROCEDURE — 1036F TOBACCO NON-USER: CPT | Performed by: FAMILY MEDICINE

## 2019-01-11 RX ORDER — HYDROXYZINE 50 MG/1
50 TABLET, FILM COATED ORAL 3 TIMES DAILY PRN
COMMUNITY
End: 2022-01-18

## 2019-01-11 RX ORDER — METOPROLOL SUCCINATE 50 MG/1
TABLET, EXTENDED RELEASE ORAL
Qty: 30 TABLET | Refills: 5 | Status: SHIPPED | OUTPATIENT
Start: 2019-01-11 | End: 2019-08-28 | Stop reason: SDUPTHER

## 2019-01-11 RX ORDER — BUSPIRONE HYDROCHLORIDE 5 MG/1
20 TABLET ORAL 2 TIMES DAILY
COMMUNITY
End: 2019-08-28 | Stop reason: DRUGHIGH

## 2019-01-11 RX ORDER — TRAZODONE HYDROCHLORIDE 150 MG/1
150 TABLET ORAL NIGHTLY
COMMUNITY
End: 2020-02-21 | Stop reason: ALTCHOICE

## 2019-01-11 RX ORDER — BENZTROPINE MESYLATE 1 MG/1
1 TABLET ORAL NIGHTLY
COMMUNITY
End: 2019-08-28

## 2019-01-11 RX ORDER — LITHIUM CARBONATE 450 MG
450 TABLET, EXTENDED RELEASE ORAL 2 TIMES DAILY
COMMUNITY
End: 2021-01-27 | Stop reason: CLARIF

## 2019-01-11 ASSESSMENT — ENCOUNTER SYMPTOMS: RESPIRATORY NEGATIVE: 1

## 2019-01-12 LAB
ESTIMATED AVERAGE GLUCOSE: 93.9 MG/DL
HBA1C MFR BLD: 4.9 %

## 2019-08-28 ENCOUNTER — OFFICE VISIT (OUTPATIENT)
Dept: FAMILY MEDICINE CLINIC | Age: 39
End: 2019-08-28
Payer: COMMERCIAL

## 2019-08-28 VITALS
WEIGHT: 259 LBS | HEART RATE: 98 BPM | SYSTOLIC BLOOD PRESSURE: 118 MMHG | DIASTOLIC BLOOD PRESSURE: 66 MMHG | BODY MASS INDEX: 43.1 KG/M2 | OXYGEN SATURATION: 98 %

## 2019-08-28 DIAGNOSIS — I10 ESSENTIAL HYPERTENSION: ICD-10-CM

## 2019-08-28 DIAGNOSIS — G89.29 CHRONIC MIDLINE LOW BACK PAIN WITH RIGHT-SIDED SCIATICA: ICD-10-CM

## 2019-08-28 DIAGNOSIS — F31.9 BIPOLAR DISEASE, CHRONIC (HCC): Primary | ICD-10-CM

## 2019-08-28 DIAGNOSIS — M54.41 CHRONIC MIDLINE LOW BACK PAIN WITH RIGHT-SIDED SCIATICA: ICD-10-CM

## 2019-08-28 LAB
ALBUMIN SERPL-MCNC: 4.7 G/DL (ref 3.4–5)
ANION GAP SERPL CALCULATED.3IONS-SCNC: 20 MMOL/L (ref 3–16)
BUN BLDV-MCNC: 11 MG/DL (ref 7–20)
CALCIUM SERPL-MCNC: 11 MG/DL (ref 8.3–10.6)
CHLORIDE BLD-SCNC: 110 MMOL/L (ref 99–110)
CO2: 21 MMOL/L (ref 21–32)
CREAT SERPL-MCNC: 1.1 MG/DL (ref 0.6–1.1)
GFR AFRICAN AMERICAN: >60
GFR NON-AFRICAN AMERICAN: 55
GLUCOSE BLD-MCNC: 108 MG/DL (ref 70–99)
LITHIUM DOSE AMOUNT: NORMAL
LITHIUM LEVEL: 0.7 MMOL/L (ref 0.6–1.2)
PHOSPHORUS: 3.3 MG/DL (ref 2.5–4.9)
POTASSIUM SERPL-SCNC: 4.6 MMOL/L (ref 3.5–5.1)
SODIUM BLD-SCNC: 151 MMOL/L (ref 136–145)
T4 FREE: 1.3 NG/DL (ref 0.9–1.8)
TSH REFLEX: 4.59 UIU/ML (ref 0.27–4.2)

## 2019-08-28 PROCEDURE — 99214 OFFICE O/P EST MOD 30 MIN: CPT | Performed by: FAMILY MEDICINE

## 2019-08-28 PROCEDURE — 36415 COLL VENOUS BLD VENIPUNCTURE: CPT | Performed by: FAMILY MEDICINE

## 2019-08-28 PROCEDURE — G8427 DOCREV CUR MEDS BY ELIG CLIN: HCPCS | Performed by: FAMILY MEDICINE

## 2019-08-28 PROCEDURE — G8417 CALC BMI ABV UP PARAM F/U: HCPCS | Performed by: FAMILY MEDICINE

## 2019-08-28 PROCEDURE — 1036F TOBACCO NON-USER: CPT | Performed by: FAMILY MEDICINE

## 2019-08-28 RX ORDER — BUSPIRONE HYDROCHLORIDE 10 MG/1
10 TABLET ORAL
COMMUNITY
End: 2021-01-27 | Stop reason: CLARIF

## 2019-08-28 RX ORDER — LAMOTRIGINE 200 MG/1
200 TABLET ORAL NIGHTLY
COMMUNITY

## 2019-08-28 RX ORDER — METOPROLOL SUCCINATE 50 MG/1
TABLET, EXTENDED RELEASE ORAL
Qty: 30 TABLET | Refills: 5 | Status: SHIPPED | OUTPATIENT
Start: 2019-08-28 | End: 2020-02-04

## 2019-08-28 RX ORDER — MELOXICAM 15 MG/1
15 TABLET ORAL DAILY
Qty: 30 TABLET | Refills: 2 | Status: SHIPPED | OUTPATIENT
Start: 2019-08-28 | End: 2019-11-21 | Stop reason: SDUPTHER

## 2019-08-28 NOTE — PROGRESS NOTES
Subjective:      Patient ID: Molly Montenegro is a 44 y.o. female. HPI   Chief Complaint   Patient presents with    Hypertension-Denies cv/cns/taras sx     Anxiety    Manic Behavior-patient currently on multiple medications for her bipolar disorder, depression with anxiety-feels she is doing well. Has not had her lithium level checked her other blood work is significant. Of time. Probably did have her lithium level checked a few months ago.  Back Pain-mid to lower lumbar pain with some radiation to the right gluteal region. She describes this as chronic but more persistent than before and somewhat more severe also associated with some lower back cramping. Nothing down the leg just into the right hip. One time  patient saw Dr. Edelmira Sweeney and another associate. X-rays taken-report in the orthopedic note was scoliosis but otherwise unremarkable. He should not has been having some vaginal bleeding but she is 5 years into [facility]. The Mirena did induce amenorrhea until recently. Patient is not sexually active-last activity greater than 1 year. No urinary complaints and some bowel complaints with some loose stool intermittently for the last year. No history of psoriasis no history of large or small joint issues other than her back. .no change in body weight; no fatigue. started getting worse about month ago, dull pain      Chief complaint present illness: 42-year-old white female presents primarily for hypertension but also has other issues which she wishes for me to be involved in. Is seeing a tele-doc once every few months and also involved in counseling at [address]. Review of Systems  background/entire past medical,social and family history obtained and reviewed/updated today   Objective:   Physical Exam   Constitutional: She appears well-developed and well-nourished. No distress. Neck: Neck supple. Carotid bruit is not present. Cardiovascular: Normal rate and regular rhythm.

## 2019-09-11 ENCOUNTER — TELEPHONE (OUTPATIENT)
Dept: FAMILY MEDICINE CLINIC | Age: 39
End: 2019-09-11

## 2019-09-11 DIAGNOSIS — E87.0 SERUM SODIUM ELEVATED: Primary | ICD-10-CM

## 2019-09-13 NOTE — TELEPHONE ENCOUNTER
I do not quite agree with your psychiatrist but this is what I would like to do: Come in some morning and give his your first voided specimen of urine. This is what you should do for that- empty your bladder when you go to bed. And when you get up the next morning, bring us a small amount of that first voided specimen. I want to check it for the specific gravity. Next- the minor calcium elevation may actually be the result of the lithium but it is not terribly significant at this stage. If you are taking additional vitamin D and calcium supplementation, stop that. We will want to recheck your renal panel in 1 month. We may want to do more than that depending on the urine specific gravity.

## 2019-09-26 ENCOUNTER — NURSE ONLY (OUTPATIENT)
Dept: FAMILY MEDICINE CLINIC | Age: 39
End: 2019-09-26
Payer: COMMERCIAL

## 2019-09-26 DIAGNOSIS — E87.0 SERUM SODIUM ELEVATED: ICD-10-CM

## 2019-09-26 LAB — SPECIFIC GRAVITY UA: 1.02 (ref 1–1.03)

## 2019-09-26 PROCEDURE — 81003 URINALYSIS AUTO W/O SCOPE: CPT | Performed by: FAMILY MEDICINE

## 2019-09-30 DIAGNOSIS — E87.0 SERUM SODIUM ELEVATED: Primary | ICD-10-CM

## 2019-10-01 ENCOUNTER — OFFICE VISIT (OUTPATIENT)
Dept: FAMILY MEDICINE CLINIC | Age: 39
End: 2019-10-01
Payer: COMMERCIAL

## 2019-10-01 VITALS
HEART RATE: 92 BPM | OXYGEN SATURATION: 97 % | BODY MASS INDEX: 43.03 KG/M2 | DIASTOLIC BLOOD PRESSURE: 77 MMHG | WEIGHT: 258.6 LBS | SYSTOLIC BLOOD PRESSURE: 100 MMHG

## 2019-10-01 DIAGNOSIS — H53.9 CHANGE IN VISION: Primary | ICD-10-CM

## 2019-10-01 PROCEDURE — G8427 DOCREV CUR MEDS BY ELIG CLIN: HCPCS | Performed by: FAMILY MEDICINE

## 2019-10-01 PROCEDURE — 1036F TOBACCO NON-USER: CPT | Performed by: FAMILY MEDICINE

## 2019-10-01 PROCEDURE — 99213 OFFICE O/P EST LOW 20 MIN: CPT | Performed by: FAMILY MEDICINE

## 2019-10-01 PROCEDURE — G8417 CALC BMI ABV UP PARAM F/U: HCPCS | Performed by: FAMILY MEDICINE

## 2019-10-01 PROCEDURE — G8484 FLU IMMUNIZE NO ADMIN: HCPCS | Performed by: FAMILY MEDICINE

## 2019-10-04 ASSESSMENT — ENCOUNTER SYMPTOMS
EYE PAIN: 0
EYE DISCHARGE: 0
EYE REDNESS: 0
PHOTOPHOBIA: 0
EYE ITCHING: 0

## 2019-11-07 ENCOUNTER — NURSE ONLY (OUTPATIENT)
Dept: FAMILY MEDICINE CLINIC | Age: 39
End: 2019-11-07
Payer: COMMERCIAL

## 2019-11-07 DIAGNOSIS — E87.0 SERUM SODIUM ELEVATED: ICD-10-CM

## 2019-11-07 LAB
ALBUMIN SERPL-MCNC: 4.6 G/DL (ref 3.4–5)
ANION GAP SERPL CALCULATED.3IONS-SCNC: 14 MMOL/L (ref 3–16)
BUN BLDV-MCNC: 8 MG/DL (ref 7–20)
CALCIUM SERPL-MCNC: 9.4 MG/DL (ref 8.3–10.6)
CHLORIDE BLD-SCNC: 101 MMOL/L (ref 99–110)
CO2: 22 MMOL/L (ref 21–32)
CREAT SERPL-MCNC: 0.9 MG/DL (ref 0.6–1.1)
GFR AFRICAN AMERICAN: >60
GFR NON-AFRICAN AMERICAN: >60
GLUCOSE BLD-MCNC: 98 MG/DL (ref 70–99)
PHOSPHORUS: 3.2 MG/DL (ref 2.5–4.9)
POTASSIUM SERPL-SCNC: 4.3 MMOL/L (ref 3.5–5.1)
SODIUM BLD-SCNC: 137 MMOL/L (ref 136–145)

## 2019-11-07 PROCEDURE — 36415 COLL VENOUS BLD VENIPUNCTURE: CPT | Performed by: FAMILY MEDICINE

## 2019-12-27 ENCOUNTER — TELEPHONE (OUTPATIENT)
Dept: FAMILY MEDICINE CLINIC | Age: 39
End: 2019-12-27

## 2019-12-27 DIAGNOSIS — M25.552 LEFT HIP PAIN: Primary | ICD-10-CM

## 2020-01-14 ENCOUNTER — TELEPHONE (OUTPATIENT)
Dept: ORTHOPEDIC SURGERY | Age: 40
End: 2020-01-14

## 2020-01-22 ENCOUNTER — OFFICE VISIT (OUTPATIENT)
Dept: ORTHOPEDIC SURGERY | Age: 40
End: 2020-01-22
Payer: COMMERCIAL

## 2020-01-22 VITALS — WEIGHT: 258.6 LBS | RESPIRATION RATE: 13 BRPM | BODY MASS INDEX: 43.09 KG/M2 | HEIGHT: 65 IN

## 2020-01-22 PROCEDURE — 1036F TOBACCO NON-USER: CPT | Performed by: ORTHOPAEDIC SURGERY

## 2020-01-22 PROCEDURE — 99213 OFFICE O/P EST LOW 20 MIN: CPT | Performed by: ORTHOPAEDIC SURGERY

## 2020-01-22 PROCEDURE — G8417 CALC BMI ABV UP PARAM F/U: HCPCS | Performed by: ORTHOPAEDIC SURGERY

## 2020-01-22 PROCEDURE — G8484 FLU IMMUNIZE NO ADMIN: HCPCS | Performed by: ORTHOPAEDIC SURGERY

## 2020-01-22 PROCEDURE — G8428 CUR MEDS NOT DOCUMENT: HCPCS | Performed by: ORTHOPAEDIC SURGERY

## 2020-01-22 NOTE — PROGRESS NOTES
region. Range of Motion: She has good hip range of motion which is symmetric but does cause some symptoms around the hip. Strength: Limited by pain right hip    Special Tests: Negative straight leg raise negative Lasegue's. Skin: There are no rashes, ulcerations or lesions. Gait: Antalgic probably more related to her cerebral palsy and right lower extremity    Reflex present symmetric    Additional Comments:       Additional Examinations:         Left Lower Extremity: Examination of the left lower extremity does not show any tenderness, deformity or injury. Range of motion is unremarkable. There is no gross instability. There are no rashes, ulcerations or lesions. Strength and tone are normal.    Radiology:     X-rays 2 views of the right hip demonstrates some uncovering but no evidence of any SILVIANO or arthritis. Assessment : Right hip pain secondary to greater trochanteric bursitis with possible labral injury. Impression:  Encounter Diagnoses   Name Primary?  Pain of right hip joint Yes    Greater trochanteric bursitis of right hip        Office Procedures:  Orders Placed This Encounter   Procedures    XR HIP RIGHT (2-3 VIEWS)     Standing Status:   Future     Number of Occurrences:   1     Standing Expiration Date:   1/22/2021    MRI Hip Right WO Contrast     Standing Status:   Future     Standing Expiration Date:   1/21/2021     Scheduling Instructions:      [facility], [place], [facility]      [phone]     Order Specific Question:   Reason for exam:     Answer:   mri right hip - r/o oa, labral tear, bursitis       Treatment Plan: Since she is failed to improve over 2 years with conservative care of medications cortisone injections and physical therapy, would recommend an MRI of the right hip she should call or return after testing for disposition.

## 2020-01-23 ENCOUNTER — TELEPHONE (OUTPATIENT)
Dept: ORTHOPEDIC SURGERY | Age: 40
End: 2020-01-23

## 2020-01-23 NOTE — TELEPHONE ENCOUNTER
SPOKE WITH PATIENT IN REGARDS TO MRI APPROVAL RIGHT HIP. INFORMED PATIENT MRI ORDER ALONG WITH MRI AUTHORIZATION WAS FAXED TO [facility]. INFORMED PATIENT TO CALL AT THEIR CONVENIENCE TO SCHEDULE THAT MRI. ALSO, INFORMED PATIENT TO CALL OUR SCHEDULING DEPT TO SCHEDULE FOLLOW UP APPOINTMENT  WITH DR ACEVEDO TO GO OVER THOSE RESULTS . INFORMED PATIENT HE WILL NOT GIVE RESULTS OVER THE TELEPHONE.  THE PATIENT UNDERSTOOD AND AGREED WITH THE PLAN

## 2020-02-04 RX ORDER — METOPROLOL SUCCINATE 50 MG/1
TABLET, EXTENDED RELEASE ORAL
Qty: 30 TABLET | Refills: 0 | Status: SHIPPED | OUTPATIENT
Start: 2020-02-04 | End: 2020-02-21 | Stop reason: SDUPTHER

## 2020-02-05 ENCOUNTER — HOSPITAL ENCOUNTER (OUTPATIENT)
Dept: MRI IMAGING | Age: 40
Discharge: HOME | End: 2020-02-05
Payer: COMMERCIAL

## 2020-02-05 PROCEDURE — 73721 MRI JNT OF LWR EXTRE W/O DYE: CPT

## 2020-02-20 ENCOUNTER — OFFICE VISIT (OUTPATIENT)
Dept: ORTHOPEDIC SURGERY | Age: 40
End: 2020-02-20
Payer: COMMERCIAL

## 2020-02-20 PROBLEM — M25.551 PAIN OF RIGHT HIP JOINT: Status: ACTIVE | Noted: 2020-02-20

## 2020-02-20 PROCEDURE — G8417 CALC BMI ABV UP PARAM F/U: HCPCS | Performed by: ORTHOPAEDIC SURGERY

## 2020-02-20 PROCEDURE — G8428 CUR MEDS NOT DOCUMENT: HCPCS | Performed by: ORTHOPAEDIC SURGERY

## 2020-02-20 PROCEDURE — G8484 FLU IMMUNIZE NO ADMIN: HCPCS | Performed by: ORTHOPAEDIC SURGERY

## 2020-02-20 PROCEDURE — 1036F TOBACCO NON-USER: CPT | Performed by: ORTHOPAEDIC SURGERY

## 2020-02-20 PROCEDURE — 99213 OFFICE O/P EST LOW 20 MIN: CPT | Performed by: ORTHOPAEDIC SURGERY

## 2020-02-20 NOTE — PROGRESS NOTES
Chief Complaint    Follow-up (CK RIGHT HIP/MRI RESULTS )       History of Present Illness:  Sonya Jacob is a 36 y.o. female presents for evaluation of right hip pain. She is had her MRI and is here for review. She says her pain is posterior hip region and only goes down the side of her leg to a degree but not below the knee and she does not give me any symptoms suggestive of radiculopathy. Says the pain is intermittent where she has very good days and some bad days. Medical History:  Patient's medications, allergies, past medical, surgical, social and family histories were reviewed and updated as appropriate. Review of Systems:  Well-documented the patient history form dated 1/22/2020  Relevant review of systems reviewed and available in the patient's chart    Vital Signs: There were no vitals filed for this visit. General Exam:   Constitutional: Patient is adequately groomed with no evidence of malnutrition  DTRs: Deep tendon reflexes are intact  Mental Status: The patient is oriented to time, place and person. The patient's mood and affect are appropriate. Lymphatic: The lymphatic examination bilaterally reveals all areas to be without enlargement or induration. Vascular: Examination reveals no swelling or calf tenderness. Peripheral pulses are palpable and 2+. Right hip Examination:    Inspection: No visible lesions    Palpation: She is tender to palpation of the right greater trochanter and right buttock region. Range of Motion: She has good hip range of motion which is symmetric but does cause some symptoms around the hip. Strength: Limited by pain right hip    Special Tests: Negative straight leg raise negative Lasegue's. Skin: There are no rashes, ulcerations or lesions.     Gait: Antalgic probably more related to her cerebral palsy and right lower extremity    Reflex present symmetric    Additional Comments:       Additional Examinations:         Left Lower Extremity: Examination of the left lower extremity does not show any tenderness, deformity or injury. Range of motion is unremarkable. There is no gross instability. There are no rashes, ulcerations or lesions. Strength and tone are normal.    Radiology:     I reviewed the MRI of her right hip which is unremarkable for acute or chronic pathology. Assessment : Right hip pain secondary to possible piriformis syndrome    Impression:  Encounter Diagnosis   Name Primary?  Pain of right hip joint Yes       Office Procedures:  Orders Placed This Encounter   Procedures    [facility]     Referral Priority:   Routine     Referral Type:   Eval and Treat     Referral Reason:   Specialty Services Required     Requested Specialty:   Physical Therapy     Number of Visits Requested:   1       Treatment Plan: Formal physical therapy for hip stretching including piriformis stretching exercises.

## 2020-02-21 ENCOUNTER — OFFICE VISIT (OUTPATIENT)
Dept: FAMILY MEDICINE CLINIC | Age: 40
End: 2020-02-21
Payer: COMMERCIAL

## 2020-02-21 VITALS
OXYGEN SATURATION: 98 % | DIASTOLIC BLOOD PRESSURE: 80 MMHG | SYSTOLIC BLOOD PRESSURE: 110 MMHG | BODY MASS INDEX: 43.6 KG/M2 | HEART RATE: 83 BPM | WEIGHT: 262 LBS

## 2020-02-21 LAB
A/G RATIO: 1.5 (ref 1.1–2.2)
ALBUMIN SERPL-MCNC: 4.5 G/DL (ref 3.4–5)
ALP BLD-CCNC: 85 U/L (ref 40–129)
ALT SERPL-CCNC: 13 U/L (ref 10–40)
ANION GAP SERPL CALCULATED.3IONS-SCNC: 14 MMOL/L (ref 3–16)
AST SERPL-CCNC: 14 U/L (ref 15–37)
BILIRUB SERPL-MCNC: 0.3 MG/DL (ref 0–1)
BUN BLDV-MCNC: 12 MG/DL (ref 7–20)
CALCIUM SERPL-MCNC: 9.9 MG/DL (ref 8.3–10.6)
CHLORIDE BLD-SCNC: 100 MMOL/L (ref 99–110)
CHOLESTEROL, TOTAL: 197 MG/DL (ref 0–199)
CO2: 24 MMOL/L (ref 21–32)
CREAT SERPL-MCNC: 1 MG/DL (ref 0.6–1.1)
GFR AFRICAN AMERICAN: >60
GFR NON-AFRICAN AMERICAN: >60
GLOBULIN: 3.1 G/DL
GLUCOSE BLD-MCNC: 97 MG/DL (ref 70–99)
HDLC SERPL-MCNC: 48 MG/DL (ref 40–60)
LDL CHOLESTEROL CALCULATED: 125 MG/DL
LITHIUM DOSE AMOUNT: NORMAL
LITHIUM LEVEL: 0.8 MMOL/L (ref 0.6–1.2)
POTASSIUM SERPL-SCNC: 4.4 MMOL/L (ref 3.5–5.1)
SODIUM BLD-SCNC: 138 MMOL/L (ref 136–145)
TOTAL PROTEIN: 7.6 G/DL (ref 6.4–8.2)
TRIGL SERPL-MCNC: 118 MG/DL (ref 0–150)
TSH REFLEX: 2.33 UIU/ML (ref 0.27–4.2)
VLDLC SERPL CALC-MCNC: 24 MG/DL

## 2020-02-21 PROCEDURE — 1036F TOBACCO NON-USER: CPT | Performed by: FAMILY MEDICINE

## 2020-02-21 PROCEDURE — G8417 CALC BMI ABV UP PARAM F/U: HCPCS | Performed by: FAMILY MEDICINE

## 2020-02-21 PROCEDURE — G8427 DOCREV CUR MEDS BY ELIG CLIN: HCPCS | Performed by: FAMILY MEDICINE

## 2020-02-21 PROCEDURE — G8484 FLU IMMUNIZE NO ADMIN: HCPCS | Performed by: FAMILY MEDICINE

## 2020-02-21 PROCEDURE — 99213 OFFICE O/P EST LOW 20 MIN: CPT | Performed by: FAMILY MEDICINE

## 2020-02-21 RX ORDER — MELOXICAM 15 MG/1
TABLET ORAL
Qty: 30 TABLET | Refills: 5 | Status: CANCELLED | OUTPATIENT
Start: 2020-02-21

## 2020-02-21 RX ORDER — ARIPIPRAZOLE 5 MG/1
TABLET ORAL
COMMUNITY
Start: 2020-02-04 | End: 2021-01-27 | Stop reason: CLARIF

## 2020-02-21 RX ORDER — ESCITALOPRAM OXALATE 20 MG/1
TABLET ORAL
Qty: 30 TABLET | Refills: 5 | Status: CANCELLED | OUTPATIENT
Start: 2020-02-21

## 2020-02-21 RX ORDER — METOPROLOL SUCCINATE 50 MG/1
TABLET, EXTENDED RELEASE ORAL
Qty: 30 TABLET | Refills: 11 | Status: SHIPPED | OUTPATIENT
Start: 2020-02-21 | End: 2021-01-27 | Stop reason: SDUPTHER

## 2020-02-21 ASSESSMENT — ENCOUNTER SYMPTOMS
RESPIRATORY NEGATIVE: 1
EYES NEGATIVE: 1
GASTROINTESTINAL NEGATIVE: 1

## 2020-02-21 NOTE — PROGRESS NOTES
Subjective:      Patient ID: Kristal Garcia is a 36 y.o. female. HPI  Chief Complaint   Patient presents with    Hypertension-Toprol-XL and doing well. Denies cv/cns/taras sx     Anxiety    Manic Behavior-multiple medications for her bipolar disorder. Seems to doing quite well. Sees a nurse practitioner at [facility]. Has not had blood work in 6 months. No change. Is currently dating and is pretty happy about that. Is complaining about urinary frequency but no irritative bladder symptoms. Is also complaining about resting tremor which she has had since being on the lithium. Has not gotten worse lately.  Back Pain    Blood Work     wants lithium level checked last lithium dose at//3 PM-450 mg-first dose of the day. It is now 5     Chief complaint present illness: 27-year-old white female presents unaccompanied for routine follow-up. Spent a significant amount of time talking about her psychiatric disorder and she is doing quite well. From our hypertensive point of view was also doing quite well. Review of Systems   Constitutional: Negative. Eyes: Negative. Respiratory: Negative. Cardiovascular: Negative. Gastrointestinal: Negative. Neurological: Positive for tremors. Negative for speech difficulty, weakness, numbness and headaches. background/entire past medical,social and family history obtained and reviewed/updated today   Objective:   Physical Exam  Vitals signs and nursing note reviewed. Constitutional:       Appearance: She is obese. HENT:      Head: Normocephalic. Mouth/Throat:      Mouth: Mucous membranes are moist.      Pharynx: Oropharynx is clear. Eyes:      General: No scleral icterus. Conjunctiva/sclera: Conjunctivae normal.   Neck:      Musculoskeletal: Neck supple. No muscular tenderness. Thyroid: No thyroid mass, thyromegaly or thyroid tenderness. Cardiovascular:      Rate and Rhythm: Normal rate and regular rhythm.       Heart sounds:

## 2020-02-22 NOTE — PROGRESS NOTES
Blood drawn per order. Needle size: 23 g butterfly   Site: R Antecubital.  First attempt successful Yes    Second attempt na    Pressure applied until bleeding stopped. Cotton ball and band aid applied. Patient informed to call office or return if bleeding reoccurs and unable to stop.     Tubes drawn: 1 purple     2 red

## 2020-02-26 ENCOUNTER — HOSPITAL ENCOUNTER (OUTPATIENT)
Dept: PHYSICAL THERAPY | Age: 40
Setting detail: THERAPIES SERIES
Discharge: HOME | End: 2020-02-26
Payer: COMMERCIAL

## 2020-02-26 PROCEDURE — 97110 THERAPEUTIC EXERCISES: CPT

## 2020-02-26 PROCEDURE — 97161 PT EVAL LOW COMPLEX 20 MIN: CPT

## 2020-02-26 PROCEDURE — 97140 MANUAL THERAPY 1/> REGIONS: CPT

## 2020-02-26 NOTE — FLOWSHEET NOTE
[facility], [address] [place] [address] Eleuterio Bal, [id number]    Physical Therapy Treatment Note/ Progress Report:     Date:  2020    Patient Name:  Carson Jean    :  1980  MRN: [id number]  Restrictions/Precautions:    Medical/Treatment Diagnosis Information:  · Diagnosis: Right Hip Pain  · Treatment Diagnosis: V55.980  Insurance/Certification information:  PT Insurance Information: [employer]  Physician Information:  Referring Practitioner: Sarah Isaac  Has the plan of care been signed (Y/N):        []  Yes  [x]  No     Date of Patient follow up with Physician:     Is this a Progress Report:     []  Yes  [x]  No      If Yes:  Date Range for reporting period:  Initial Eval: 2020  Beginnin2020 --- Ending: 3/26/2020    Progress report will be due (10 Rx or 30 days whichever is less):      Recertification will be due (POC Duration  / 90 days whichever is less): 2020     Visit # Insurance Allowable Auth Required   1 30 visits []  Yes []  No      Functional Scale: LEFS: 56% (Score: 35/80)   Date assessed: 2020      Latex Allergy:  [x]NO      []YES  Preferred Language for Healthcare:   [x]English       []other:    Pain level:  3-4/10     SUBJECTIVE:  See eval    OBJECTIVE: See eval   Observation:    Test measurements:      RESTRICTIONS/PRECAUTIONS: n/a    Exercises/Interventions:   Therapeutic Ex (74255)  Therapeutic Activity (91838)  NMR re-education (83318) Sets/Reps Notes/CUES   Bike          PPT 15 x 5\"    Bridging with TBand 10 x 5\"    HL Hip Add Squeeze 15 x 5\"    HL Hip Abd 3 way 15 x ea    SLR 10 x B          Supine HS Stretch 3 x 30\"    Piriformis Stretch 3 x 30\"                                                                          Manual Intervention ([phone])     DTM to TFL/IT Band 10'                                                 Patient Education 5' Pt ed with HEP       Therapeutic Exercise and NMR EXR  [x] (88715) Provided verbal/tactile cueing for

## 2020-02-26 NOTE — PLAN OF CARE
[address], [address] [address] [address]  Phone: [phone], [phone]                                                       Physical Therapy Certification    Dear Referring Practitioner: Elvia Baker,    We had the pleasure of evaluating the following patient for physical therapy services at [address] [place] [address]. A summary of our findings can be found in the initial assessment below. This includes our plan of care. If you have any questions or concerns regarding these findings, please do not hesitate to contact me at the office phone number checked above. Thank you for the referral.       Physician Signature:_______________________________Date:__________________  By signing above (or electronic signature), therapists plan is approved by physician    Patient: Hong Covarrubias   : 1980   MRN: [id number]  Referring Physician: Referring Practitioner: Elvia Baker      Evaluation Date: 2020      Medical Diagnosis Information:  Diagnosis: Right Hip Pain   Treatment Diagnosis: M25.551                                         Insurance information: PT Insurance Information: [employer]     Precautions/ Contra-indications/Relevant Medical History:    Latex Allergy:  [x]NO      []YES    Preferred Language for Healthcare:   [x]English       []other:    SUBJECTIVE: Patient stated complaint: Patient is a 37 y/o female who presents with increased right hip pain over past 6 months pain has progressively gotten worse. Patient reports pain limiting amount of functional activity she performs due to the pain.     Functional Disability Index:LEFS: 56% (Score: 35/80)    Pain Scale: 3-4/10  Easing factors: Lidocaine patches, heat, tylenol   Provocative factors: increased functional mobility     Type: []Constant   [x]Intermittent  []Radiating []Localized []other:     Numbness/Tingling: Pt denies    Occupation/School: [employer] Living Status/Prior Level of Function: Independent with ADLs and IADLs     OBJECTIVE: SLR R 40 ° L 60 °   Hip 90/90 R 60 ° L 45 °     ROM LEFT RIGHT   HIP Flex     HIP Abd     HIP Ext     HIP IR 10 °  18 °   HIP ER 20 °  25 °   Knee ext     Knee Flex     Ankle PF     Ankle DF     Ankle In     Ankle Ev     Strength  LEFT RIGHT   HIP Flexors 3-/5 3-/5   HIP Abductors \" \"   HIP Ext \" \"   Hip ER \" \"   Knee EXT (quad) 3+/5 3+/5   Knee Flex (HS) \" \"   Ankle DF     Ankle PF     Ankle Inv     Ankle EV          Balance (up to 10 sec) LEFT RIGHT   Feet Together     Off Set Stance     Tandem Stance     Single Limb          Circumference              Reflexes/Sensation:    [x]Dermatomes/Myotomes intact    [x]Reflexes equal and normal bilaterally   []Other:    Joint mobility:    []Normal    [x]Hypo   []Hyper    Palpation: noted tightness of Hip rotators    Functional Mobility/Transfers: slowed gait/mobility     Posture: flexed trunk at hips    Bandages/Dressings/Incisions: n/a    Gait: (include devices/WB status): decreased heel strike R sided with valgus B (R > L) and scissoring gait    Orthopedic Special Tests: Vi's test (+), Shukri Test (+), Trendelenburg (+) B                      [x] Patient history, allergies, meds reviewed. Medical chart reviewed. See intake form. Review Of Systems (ROS):  [x]Performed Review of systems (Integumentary, CardioPulmonary, Neurological) by intake and observation. Intake form has been scanned into medical record. Patient has been instructed to contact their primary care physician regarding ROS issues if not already being addressed at this time.       Co-morbidities/Complexities (which will affect course of rehabilitation):   []None           Arthritic conditions   []Rheumatoid arthritis (M05.9)  []Osteoarthritis (M19.91)   Cardiovascular conditions   [x]Hypertension (I10)  []Hyperlipidemia (E78.5)  []Angina pectoris (I20)  []Atherosclerosis (I70)   Musculoskeletal conditions   []Disc perform lifting, carrying tasks   []Reduced ability to squat   []Reduced ability to forward bend   []Reduced ability to ambulate prolonged functional periods/distances/surfaces   []Reduced ability to ascend/descend stairs   []Reduced ability to run, hop, cut or jump   []other:    Participation Restrictions   []Reduced participation in self care activities   []Reduced participation in home management activities   []Reduced participation in work activities   []Reduced participation in social activities. []Reduced participation in sport/recreation activities. Classification :    []Signs/symptoms consistent with post-surgical status including decreased ROM, strength and function.    []Signs/symptoms consistent with joint sprain/strain   []Signs/symptoms consistent with patella-femoral syndrome   []Signs/symptoms consistent with knee OA/hip OA   []Signs/symptoms consistent with internal derangement of knee/Hip   []Signs/symptoms consistent with functional hip weakness/NMR control      []Signs/symptoms consistent with tendinitis/tendinosis    []signs/symptoms consistent with pathology which may benefit from Dry needling      []other:      Prognosis/Rehab Potential:      []Excellent   []Good    [x]Fair   []Poor    Tolerance of evaluation/treatment:    []Excellent   []Good    [x]Fair   []Poor    Physical Therapy Evaluation Complexity Justification   [x] A history of present problem with:  [] no personal factors and/or comorbidities that impact the plan of care;  []1-2 personal factors and/or comorbidities that impact the plan of care  [x]3 personal factors and/or comorbidities that impact the plan of care  [x] An examination of body systems using standardized tests and measures addressing any of the following: body structures and functions (impairments), activity limitations, and/or participation restrictions;:  [] a total of 1-2 or more elements   [] a total of 3 or more elements   [x] a total of 4 or more elements [x] A clinical presentation with:  [x] stable and/or uncomplicated characteristics   [] evolving clinical presentation with changing characteristics  [] unstable and unpredictable characteristics;   [x] Clinical decision making of [x] low, [] moderate, [] high complexity using standardized patient assessment instrument and/or measurable assessment of functional outcome. [x] EVAL (LOW) 04877 (typically 20 minutes face-to-face)  [] EVAL (MOD) 34212 (typically 30 minutes face-to-face)  [] EVAL (HIGH) 78872 (typically 45 minutes face-to-face)  [] RE-EVAL     PLAN  Frequency/Duration:  1-2 days per week for 12 weeks:  Interventions:  [x]  Therapeutic exercise including: strength training, ROM, for Lower extremity and core   [x]  NMR activation and proprioception for LE, Glutes and Core   [x]  Manual therapy as indicated for LE, Hip and spine to include: Dry Needling/IASTM, STM, PROM, Gr I-IV mobilizations, manipulation. [x] Modalities as needed that may include: thermal agents, E-stim, Biofeedback, US, iontophoresis as indicated  [x] Patient education on joint protection, postural re-education, activity modification, progression of HEP. HEP instruction: (see scanned forms)    GOALS:    Therapist goals for Patient:   Short Term Goals: To be achieved in: 2 weeks  1. Independent in HEP and progression per patient tolerance, in order to prevent re-injury. [] Progressing: [] Met: [] Not Met: [] Adjusted   2. Patient will have a decrease in pain to facilitate improvement in movement, function, and ADLs as indicated by Functional Deficits. [] Progressing: [] Met: [] Not Met: [] Adjusted     Long Term Goals: To be achieved in: 12 weeks  1. Disability index score of 20% or less for the LEFS to assist with reaching prior level of function. [] Progressing: [] Met: [] Not Met: [] Adjusted   2.  Patient will demonstrate increased AROM to equal the opposite side bilaterally to allow for proper joint functioning as indicated by patients Functional Deficits. [] Progressing: [] Met: [] Not Met: [] Adjusted   3. Patient will demonstrate an increase in strength of B LE grossly by 1 grade to allow for proper functional mobility as indicated by patients Functional Deficits. [] Progressing: [] Met: [] Not Met: [] Adjusted   4. Patient will return to all transfers, work activities, and functional activities without increased symptoms or restriction. [] Progressing: [] Met: [] Not Met: [] Adjusted   5. Patient will have 0/10 pain with ADL's.  [] Progressing: [] Met: [] Not Met: [] Adjusted   6.  Patient stated goal: to have decreased pain to be more active  [] Progressing: [] Met: [] Not Met: [] Adjusted      Electronically signed by:  Glen Chicas PT

## 2020-03-02 ENCOUNTER — HOSPITAL ENCOUNTER (OUTPATIENT)
Dept: PHYSICAL THERAPY | Age: 40
Setting detail: THERAPIES SERIES
End: 2020-03-02
Payer: COMMERCIAL

## 2020-03-10 ENCOUNTER — HOSPITAL ENCOUNTER (OUTPATIENT)
Dept: PHYSICAL THERAPY | Age: 40
Setting detail: THERAPIES SERIES
Discharge: HOME | End: 2020-03-10
Payer: COMMERCIAL

## 2020-03-10 PROCEDURE — 97110 THERAPEUTIC EXERCISES: CPT | Performed by: PHYSICAL THERAPY ASSISTANT

## 2020-03-10 PROCEDURE — 97140 MANUAL THERAPY 1/> REGIONS: CPT | Performed by: PHYSICAL THERAPY ASSISTANT

## 2020-03-10 NOTE — FLOWSHEET NOTE
[facility],  [facility] [address], [address]    Physical Therapy Treatment Note/ Progress Report:     Date:  3/10/2020    Patient Name:  June Ayala    :  1980  MRN: [id number]  Restrictions/Precautions:    Medical/Treatment Diagnosis Information:  · Diagnosis: Right Hip Pain  · Treatment Diagnosis: L08.512  Insurance/Certification information:  PT Insurance Information: [employer]  Physician Information:  Referring Practitioner: Keyanna Franco  Has the plan of care been signed (Y/N):        []  Yes  [x]  No     Date of Patient follow up with Physician:     Is this a Progress Report:     []  Yes  [x]  No      If Yes:  Date Range for reporting period:  Initial Eval: 2020  Beginnin2020 --- Ending: 3/26/2020    Progress report will be due (10 Rx or 30 days whichever is less):      Recertification will be due (POC Duration  / 90 days whichever is less): 2020     Visit # Insurance Allowable Auth Required   2 30 visits []  Yes []  No      Functional Scale: LEFS: 56% (Score: 35/80)   Date assessed: 3/10/2020      Latex Allergy:  [x]NO      []YES  Preferred Language for Healthcare:   [x]English       []other:    Pain level:  3-4/10     SUBJECTIVE:  \"I feel better\"    OBJECTIVE: See eval   Observation:    Test measurements:      RESTRICTIONS/PRECAUTIONS: n/a    Exercises/Interventions:   Therapeutic Ex (47009)  Therapeutic Activity (75819)  NMR re-education (38344) Sets/Reps Notes/CUES   Bike          PPT 15 x 5\"    Bridging with TBand 2x10 3\" hold   HL Hip Add Squeeze 15 x 5\"    HL Hip Abd 3 way 20 x ea    SLR 2x10 B    SAQ x30     LAQ x30    SL clamshell x20         Supine HS Stretch 3 x 30\"    Piriformis Stretch 3 x 30\"                                                                          Manual Intervention ([id number])     DTM to TFL/IT Band 8'                                                 Patient Education 5' Pt ed with HEP       Therapeutic Exercise and NMR proper ROM for normal function with self-care, mobility, lifting and ambulation. Modalities:      Charges:  Timed Code Treatment Minutes: 30'   Total Treatment Minutes:  30'   [facility]:  TE TIME:  NMR TIME:  MANUAL TIME:  UNTIMED MINUTES:   -  -  -  -      [] EVAL (LOW) 71803 (typically 20 minutes face-to-face)  [] EVAL (MOD) 40364 (typically 30 minutes face-to-face)  [] EVAL (HIGH) 67768 (typically 45 minutes face-to-face)  [] RE-EVAL     [x] ZU(71217) x  1   [] IONTO  [] NMR (39339) x     [] VASO  [x] Manual (05447) x 1     [] Other:  [] TA x      [] Mech Traction (74086)  [] ES(attended) (33672)      [] ES (un) (48821):    ASSESSMENT:  See eval    GOALS:   Short Term Goals: To be achieved in: 2 weeks  1. Independent in HEP and progression per patient tolerance, in order to prevent re-injury. [] Progressing: [] Met: [] Not Met: [] Adjusted   2. Patient will have a decrease in pain to facilitate improvement in movement, function, and ADLs as indicated by Functional Deficits. [] Progressing: [] Met: [] Not Met: [] Adjusted     Long Term Goals: To be achieved in: 12 weeks  1. Disability index score of 20% or less for the LEFS to assist with reaching prior level of function. [] Progressing: [] Met: [] Not Met: [] Adjusted   2. Patient will demonstrate increased AROM to equal the opposite side bilaterally to allow for proper joint functioning as indicated by patients Functional Deficits. [] Progressing: [] Met: [] Not Met: [] Adjusted   3. Patient will demonstrate an increase in strength of B LE grossly by 1 grade to allow for proper functional mobility as indicated by patients Functional Deficits. [] Progressing: [] Met: [] Not Met: [] Adjusted   4. Patient will return to all transfers, work activities, and functional activities without increased symptoms or restriction. [] Progressing: [] Met: [] Not Met: [] Adjusted   5.  Patient will have 0/10 pain with ADL's.  [] Progressing: [] Met: [] Not Met: [] Adjusted   6. Patient stated goal: to have decreased pain to be more active  [] Progressing: [] Met: [] Not Met: [] Adjusted        Overall Progression Towards Functional goals/ Treatment Progress Update:  [] Patient is progressing as expected towards functional goals listed. [] Progression is slowed due to complexities/Impairments listed. [] Progression has been slowed due to co-morbidities. [x] Plan just implemented, too soon to assess goals progression <30days   [] Goals require adjustment due to lack of progress  [] Patient is not progressing as expected and requires additional follow up with physician  [] Other    Prognosis for POC: [x] Good [] Fair  [] Poor    Patient requires continued skilled intervention: [x] Yes  [] No    Treatment/Activity Tolerance:  [x] Patient able to complete treatment  [] Patient limited by fatigue  [] Patient limited by pain    [] Patient limited by other medical complications  [] Other:     Return to Play: (if applicable)   []  Stage 1: Intro to Strength   []  Stage 2: Return to Run and Strength   []  Stage 3: Return to Jump and Strength   []  Stage 4: Dynamic Strength and Agility   []  Stage 5: Sport Specific Training     []  Ready to Return to Play, Meets All Above Stages   []  Not Ready for Return to Sports   Comments:                         PLAN: See eval  [x] Continue per plan of care [] Alter current plan (see comments above)  [] Plan of care initiated [] Hold pending MD visit [] Discharge    Electronically signed by:  Arvind Vásquez PTA    Note: If patient does not return for scheduled/ recommended follow up visits, this note will serve as a discharge from care along with most recent update on progress.

## 2020-03-12 ENCOUNTER — HOSPITAL ENCOUNTER (OUTPATIENT)
Dept: PHYSICAL THERAPY | Age: 40
Setting detail: THERAPIES SERIES
Discharge: HOME | End: 2020-03-12
Payer: COMMERCIAL

## 2020-03-12 PROCEDURE — 97110 THERAPEUTIC EXERCISES: CPT | Performed by: PHYSICAL THERAPY ASSISTANT

## 2020-03-12 PROCEDURE — 97112 NEUROMUSCULAR REEDUCATION: CPT | Performed by: PHYSICAL THERAPY ASSISTANT

## 2020-03-12 PROCEDURE — 97140 MANUAL THERAPY 1/> REGIONS: CPT | Performed by: PHYSICAL THERAPY ASSISTANT

## 2020-03-12 NOTE — FLOWSHEET NOTE
[facility], [address] [place] [address] [address], [address]    Physical Therapy Treatment Note/ Progress Report:     Date:  3/12/2020    Patient Name:  Guera Anne    :  1980  MRN: [id number]  Restrictions/Precautions:    Medical/Treatment Diagnosis Information:  · Diagnosis: Right Hip Pain  · Treatment Diagnosis: V55.531  Insurance/Certification information:  PT [facility] Information[facility]  Physician Information:  Referring Practitioner: Mark Ayala  Has the plan of care been signed (Y/N):        []  Yes  [x]  No     Date of Patient follow up with Physician:     Is this a Progress Report:     []  Yes  [x]  No      If Yes:  Date Range for reporting period:  Initial Eval: 2020  Beginnin2020 --- Ending: 3/26/2020    Progress report will be due (10 Rx or 30 days whichever is less):      Recertification will be due (POC Duration  / 90 days whichever is less): 2020     Visit # Insurance Allowable Auth Required   2 30 visits []  Yes []  No      Functional Scale: LEFS: 56% (Score: 35/80)   Date assessed: 3/12/2020      Latex Allergy:  [x]NO      []YES  Preferred Language for Healthcare:   [x]English       []other:    Pain level:  0/10     SUBJECTIVE:  Feeling good. Stretches have helped a lot. Only taken Tylenol once in the past week.     OBJECTIVE: See eval   Observation:    Test measurements:      RESTRICTIONS/PRECAUTIONS: n/a    Exercises/Interventions:   Therapeutic Ex (32328)  Therapeutic Activity (60659)  NMR re-education (82485) Sets/Reps Notes/CUES   Bike 5 L5         HR/ Slant Board  X30 / 3x30\"    HS Curl / Hip ABD  2x10 / 2x10    Marches  x20    FSU  4\" x20               PPT 15 x 5\"    Bridging with TBand 2x10 3\" hold   HL Hip Add Squeeze 20 x 5\"    HL Hip Abd 3 way 20 x ea red   SLR 2x10 B    SAQ x30     LAQ x30    SL clamshell x20         Supine HS Stretch 3 x 30\"    Piriformis Stretch 3 x 30\" Manual Intervention (92339)     DTM to TFL/IT Band 8'                                                 Patient Education 5' Pt ed with HEP       Therapeutic Exercise and NMR EXR  [x] (34638) Provided verbal/tactile cueing for activities related to strengthening, flexibility, endurance, ROM for improvements in LE, proximal hip, and core control with self care, mobility, lifting, ambulation. [x] (71512) Provided verbal/tactile cueing for activities related to improving balance, coordination, kinesthetic sense, posture, motor skill, proprioception to assist with LE, proximal hip, and core control in self-care, mobility, lifting, ambulation and eccentric single leg control. NMR and Therapeutic Activities:    [x] (54298 or 35664) Provided verbal/tactile cueing for activities related to improving balance, coordination, kinesthetic sense, posture, motor skill, proprioception and motor activation to allow for proper function of core, proximal hip and LE with self-care and ADLs and functional mobility.    [x] (54288) Gait Re-education- Provided training and instruction to the patient for proper LE, core and proximal hip recruitment and positioning and eccentric body weight control with ambulation re-education including up and down stairs     Home Exercise Program:    [x] (26268) Reviewed/Progressed HEP activities related to strengthening, flexibility, endurance, ROM of core, proximal hip and LE for functional self-care, mobility, lifting and ambulation/stair navigation   [x] (67529) Reviewed/Progressed HEP activities related to improving balance, coordination, kinesthetic sense, posture, motor skill, proprioception of core, proximal hip and LE for self-care, mobility, lifting, and ambulation/stair navigation      Manual Treatments:  PROM / STM / Oscillations-Mobs:  G-I, II, III, IV (PA's, Inf., Post.)  [x] (09328) Provided manual therapy to mobilize LE, proximal hip and/or LS spine soft tissue/joints for the

## 2020-03-17 ENCOUNTER — APPOINTMENT (OUTPATIENT)
Dept: PHYSICAL THERAPY | Age: 40
End: 2020-03-17
Payer: COMMERCIAL

## 2020-03-19 ENCOUNTER — APPOINTMENT (OUTPATIENT)
Dept: PHYSICAL THERAPY | Age: 40
End: 2020-03-19
Payer: COMMERCIAL

## 2020-10-28 ENCOUNTER — NURSE TRIAGE (OUTPATIENT)
Dept: OTHER | Facility: CLINIC | Age: 40
End: 2020-10-28

## 2020-10-28 NOTE — TELEPHONE ENCOUNTER
Patient transferred to office. Informed we are not seeing RED patients and are not doing any strep/flu/covid testing here in office during pandemic. Advised to be seen at [facility].

## 2020-10-28 NOTE — TELEPHONE ENCOUNTER
Reason for Disposition   [1] Sore throat AND [2] strep throat EXPOSURE (i.e., meets definition) within past 10 days    Answer Assessment - Initial Assessment Questions  1. STREP EXPOSURE: \"Was the exposure to someone who lives within your home? \" If not, ask: \"How much contact did you have with the sick individual?\"       Kids tested positive for strep last night     2. ONSET: \"How many days ago did the contact occur? \"         Live in same house     3. PROVEN STREP: Omie Helling you sure the person with strep had a positive throat culture or rapid strep test?\"         Yes    4. STREP SYMPTOMS: \"Do YOU have a sore throat, fever, or other symptoms suggestive of strep? \"          Started with sore throat 2 days ago with 5/10 pain     5. VIRAL SYMPTOMS: Omie Helling there any symptoms of a cold, such as a runny nose, cough, hoarse voice? \"          Denies    6. PREGNANCY: \"Is there any chance you are pregnant? \" \"When was your last menstrual period? \"           Denies    Protocols used: STREP THROAT EXPOSURE-ADULT-    Patient called pre-service center [facility] with red flag complaint. Brief description of triage: see above     Triage indicates for patient to be seen today for possible strep throat. Care advice provided, patient verbalizes understanding; denies any other questions or concerns; instructed to call back for any new or worsening symptoms. Writer provided warm transfer to Munir Mckinley  at [facility] for appointment scheduling. Attention Provider: Thank you for allowing me to participate in the care of your patient. The patient was connected to triage in response to information provided to the [facility]. Please do not respond through this encounter as the response is not directed to a shared pool.

## 2021-01-27 ENCOUNTER — OFFICE VISIT (OUTPATIENT)
Dept: FAMILY MEDICINE CLINIC | Age: 41
End: 2021-01-27
Payer: COMMERCIAL

## 2021-01-27 ENCOUNTER — TELEPHONE (OUTPATIENT)
Dept: FAMILY MEDICINE CLINIC | Age: 41
End: 2021-01-27

## 2021-01-27 VITALS
OXYGEN SATURATION: 96 % | TEMPERATURE: 96.8 F | SYSTOLIC BLOOD PRESSURE: 132 MMHG | WEIGHT: 278 LBS | RESPIRATION RATE: 20 BRPM | HEIGHT: 65 IN | BODY MASS INDEX: 46.32 KG/M2 | HEART RATE: 92 BPM | DIASTOLIC BLOOD PRESSURE: 76 MMHG

## 2021-01-27 DIAGNOSIS — Z12.31 BREAST CANCER SCREENING BY MAMMOGRAM: ICD-10-CM

## 2021-01-27 DIAGNOSIS — F32.A FATIGUE DUE TO DEPRESSION: ICD-10-CM

## 2021-01-27 DIAGNOSIS — F33.1 MDD (MAJOR DEPRESSIVE DISORDER), RECURRENT EPISODE, MODERATE (HCC): ICD-10-CM

## 2021-01-27 DIAGNOSIS — E66.01 MORBID OBESITY WITH BMI OF 45.0-49.9, ADULT (HCC): ICD-10-CM

## 2021-01-27 DIAGNOSIS — F31.9 BIPOLAR DISEASE, CHRONIC (HCC): ICD-10-CM

## 2021-01-27 DIAGNOSIS — I10 ESSENTIAL HYPERTENSION: Primary | ICD-10-CM

## 2021-01-27 DIAGNOSIS — R53.83 FATIGUE DUE TO DEPRESSION: ICD-10-CM

## 2021-01-27 DIAGNOSIS — G80.8 OTHER CEREBRAL PALSY (HCC): ICD-10-CM

## 2021-01-27 PROCEDURE — 1036F TOBACCO NON-USER: CPT | Performed by: NURSE PRACTITIONER

## 2021-01-27 PROCEDURE — 99214 OFFICE O/P EST MOD 30 MIN: CPT | Performed by: NURSE PRACTITIONER

## 2021-01-27 PROCEDURE — G8484 FLU IMMUNIZE NO ADMIN: HCPCS | Performed by: NURSE PRACTITIONER

## 2021-01-27 PROCEDURE — 36415 COLL VENOUS BLD VENIPUNCTURE: CPT | Performed by: NURSE PRACTITIONER

## 2021-01-27 PROCEDURE — G8417 CALC BMI ABV UP PARAM F/U: HCPCS | Performed by: NURSE PRACTITIONER

## 2021-01-27 PROCEDURE — G8427 DOCREV CUR MEDS BY ELIG CLIN: HCPCS | Performed by: NURSE PRACTITIONER

## 2021-01-27 RX ORDER — BUSPIRONE HYDROCHLORIDE 15 MG/1
15 TABLET ORAL
COMMUNITY

## 2021-01-27 RX ORDER — METOPROLOL SUCCINATE 50 MG/1
TABLET, EXTENDED RELEASE ORAL
Qty: 90 TABLET | Refills: 3 | Status: SHIPPED | OUTPATIENT
Start: 2021-01-27 | End: 2021-07-20 | Stop reason: SDUPTHER

## 2021-01-27 RX ORDER — ARIPIPRAZOLE 10 MG/1
10 TABLET ORAL DAILY
COMMUNITY

## 2021-01-27 ASSESSMENT — ENCOUNTER SYMPTOMS
COUGH: 0
DIARRHEA: 0
CONSTIPATION: 0
CHEST TIGHTNESS: 0
SHORTNESS OF BREATH: 0
BLOOD IN STOOL: 0

## 2021-01-27 NOTE — TELEPHONE ENCOUNTER
Left message with [facility], they are checking with the nurse.  Will either fax the results or call back for a JOYCE

## 2021-01-27 NOTE — PROGRESS NOTES
1/27/2021    Chief Complaint   Patient presents with    Depression    Hypertension       Mandie Waters is a 39 y.o. female, presents today for:    HPI   Doing well since last visit. HTN: No CP, SOB, leg swelling, orthopnea, PND. Tolerating meds well. Not checking BP at home. Not Watching diet intake, has been gaining weight due to Abilify. No consistent physical activity. Depression: Well controlled, continuing to follow with Psych at [facility]. [place] recently stopped due to tremor. NO SI/ HI  Cerebral Palsy: No new issues. 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 Has not started Mammograms. Review of Systems   Constitutional: Positive for unexpected weight change (gain). Respiratory: Negative for cough, chest tightness and shortness of breath. Cardiovascular: Negative. Gastrointestinal: Negative for blood in stool, constipation and diarrhea. Skin: Negative. Neurological: Negative for dizziness, tremors, light-headedness and headaches. Psychiatric/Behavioral: Negative for decreased concentration, dysphoric mood, self-injury, sleep disturbance and suicidal ideas. The patient is not nervous/anxious. Current Outpatient Medications on File Prior to Visit   Medication Sig Dispense Refill    ARIPiprazole (ABILIFY) 10 MG tablet Take 10 mg by mouth daily      busPIRone (BUSPAR) 15 MG tablet Take 15 mg by mouth 2 in am; 1 qhs      lamoTRIgine (LAMICTAL) 200 MG tablet Take 200 mg by mouth nightly       hydrOXYzine (ATARAX) 50 MG tablet Take 50 mg by mouth 3 times daily as needed for Anxiety      prazosin (MINIPRESS) 2 MG capsule Take 2 mg by mouth nightly       escitalopram (LEXAPRO) 20 MG tablet TAKE ONE TABLET BY MOUTH DAILY (Patient taking differently: Take 20 mg by mouth daily TAKE ONE TABLET BY MOUTH DAILY) 30 tablet 3     No current facility-administered medications on file prior to visit.        Allergies   Allergen Reactions    Sulfa Antibiotics     Amoxicillin Rash  Ultram [Tramadol] Nausea And Vomiting    Vicodin [Hydrocodone-Acetaminophen] Nausea And Vomiting     Past Medical History:   Diagnosis Date    Bipolar disorder ([address]     Cerebral palsy (HCC)     legs;mainly right    Depression     Hypertension      Past Surgical History:   Procedure Laterality Date    FOOT SURGERY      KNEE SURGERY Left 6/12/15    LEG SURGERY        Social History     Tobacco Use    Smoking status: Never Smoker    Smokeless tobacco: Never Used   Substance Use Topics    Alcohol use: No     Alcohol/week: 0.0 standard drinks      No family history on file. Vitals:    01/27/21 1122   BP: 132/76   Pulse: 92   Resp: 20   Temp: 96.8 °F (36 °C)   TempSrc: Infrared   SpO2: 96%  Comment: room air   Weight: 278 lb (126.1 kg)   Height: 5' 5\" (1.651 m)     Estimated body mass index is 46.26 kg/m² as calculated from the following:    Height as of this encounter: 5' 5\" (1.651 m). Weight as of this encounter: 278 lb (126.1 kg). Physical Exam  Vitals signs reviewed. Constitutional:       Appearance: Normal appearance. HENT:      Head: Normocephalic. Neck:      Vascular: No carotid bruit. Cardiovascular:      Rate and Rhythm: Normal rate and regular rhythm. Pulses: Normal pulses. Heart sounds: Normal heart sounds. No murmur. No gallop. Pulmonary:      Effort: Pulmonary effort is normal.      Breath sounds: Normal breath sounds. Abdominal:      General: Abdomen is flat. Palpations: Abdomen is soft. Musculoskeletal: Normal range of motion. Comments: Fine tremor right hand   Skin:     General: Skin is warm and dry. Capillary Refill: Capillary refill takes less than 2 seconds. Neurological:      General: No focal deficit present. Mental Status: She is alert and oriented to person, place, and time. Psychiatric:         Mood and Affect: Mood normal.         Behavior: Behavior normal.         ASSESSMENT/PLAN:  1.  Essential hypertension  [place] Continue Metoprolol  Encouraged heart healthy diet  Encouraged 30 min activity daily  Encouraged weight loss    - metoprolol succinate (TOPROL XL) 50 MG extended release tablet; TAKE 1 TABLET BY MOUTH DAILY blood pressure  Dispense: 90 tablet; Refill: 3  - CBC  - Comprehensive Metabolic Panel  - Lipid Panel    2. MDD (major depressive disorder), recurrent episode, moderate ([facility]  Stable today  Continue Care with [facility]    3. Bipolar Disease, chronic ([facility]  See #2    4. Other cerebral palsy (HCC)  Stable today, continue to monitor    5. Fatigue due to depression  Labs today  - TSH with Reflex  - Vitamin B12 & Folate  - Vitamin D 25 Hydroxy    6. Breast cancer screening by mammogram  Mammogram ordered  - [facility] DIGITAL SCREEN W OR WO CAD BILATERAL; Future    7. Morbid obesity with BMI of 45.0-49.9, adult ([facility]  Labs today  Encouraged exercise, portion control      Return in about 6 months (around 7/27/2021). Patient's questions answered and concerns addressed. Patient agrees to plan of care.           Electronically signed by HENRY Abernathy CNP on 1/27/2021 at 12:26 PM

## 2021-01-27 NOTE — TELEPHONE ENCOUNTER
----- Message from HENRY Romo CNP sent at 1/27/2021 11:57 AM EST -----  Regarding: [facility] Recent Pap

## 2021-01-28 LAB
A/G RATIO: 1.4 (ref 1.1–2.2)
ALBUMIN SERPL-MCNC: 4.2 G/DL (ref 3.4–5)
ALP BLD-CCNC: 81 U/L (ref 40–129)
ALT SERPL-CCNC: 9 U/L (ref 10–40)
ANION GAP SERPL CALCULATED.3IONS-SCNC: 15 MMOL/L (ref 3–16)
AST SERPL-CCNC: 11 U/L (ref 15–37)
BILIRUB SERPL-MCNC: <0.2 MG/DL (ref 0–1)
BUN BLDV-MCNC: 14 MG/DL (ref 7–20)
CALCIUM SERPL-MCNC: 9.7 MG/DL (ref 8.3–10.6)
CHLORIDE BLD-SCNC: 100 MMOL/L (ref 99–110)
CHOLESTEROL, TOTAL: 202 MG/DL (ref 0–199)
CO2: 22 MMOL/L (ref 21–32)
CREAT SERPL-MCNC: 0.9 MG/DL (ref 0.6–1.1)
FOLATE: 6.61 NG/ML (ref 4.78–24.2)
GFR AFRICAN AMERICAN: >60
GFR NON-AFRICAN AMERICAN: >60
GLOBULIN: 3.1 G/DL
GLUCOSE BLD-MCNC: 88 MG/DL (ref 70–99)
HCT VFR BLD CALC: 42.3 % (ref 36–48)
HDLC SERPL-MCNC: 55 MG/DL (ref 40–60)
HEMOGLOBIN: 14 G/DL (ref 12–16)
LDL CHOLESTEROL CALCULATED: 131 MG/DL
MCH RBC QN AUTO: 26.7 PG (ref 26–34)
MCHC RBC AUTO-ENTMCNC: 33 G/DL (ref 31–36)
MCV RBC AUTO: 80.9 FL (ref 80–100)
PDW BLD-RTO: 14.7 % (ref 12.4–15.4)
PLATELET # BLD: 299 K/UL (ref 135–450)
PMV BLD AUTO: 7.7 FL (ref 5–10.5)
POTASSIUM SERPL-SCNC: 4.4 MMOL/L (ref 3.5–5.1)
RBC # BLD: 5.23 M/UL (ref 4–5.2)
SODIUM BLD-SCNC: 137 MMOL/L (ref 136–145)
TOTAL PROTEIN: 7.3 G/DL (ref 6.4–8.2)
TRIGL SERPL-MCNC: 82 MG/DL (ref 0–150)
TSH REFLEX: 1.49 UIU/ML (ref 0.27–4.2)
VITAMIN B-12: 339 PG/ML (ref 211–911)
VITAMIN D 25-HYDROXY: 13.9 NG/ML
VLDLC SERPL CALC-MCNC: 16 MG/DL
WBC # BLD: 7.7 K/UL (ref 4–11)

## 2021-01-28 RX ORDER — ERGOCALCIFEROL 1.25 MG/1
50000 CAPSULE ORAL WEEKLY
Qty: 4 CAPSULE | Refills: 0 | Status: SHIPPED | OUTPATIENT
Start: 2021-01-28 | End: 2021-07-20 | Stop reason: CLARIF

## 2021-03-05 ENCOUNTER — HOSPITAL ENCOUNTER (OUTPATIENT)
Dept: MAMMOGRAPHY | Age: 41
Discharge: HOME | End: 2021-03-10
Payer: COMMERCIAL

## 2021-03-05 DIAGNOSIS — Z12.31 VISIT FOR SCREENING MAMMOGRAM: ICD-10-CM

## 2021-03-10 ENCOUNTER — TELEPHONE (OUTPATIENT)
Dept: FAMILY MEDICINE CLINIC | Age: 41
End: 2021-03-10

## 2021-03-10 NOTE — TELEPHONE ENCOUNTER
Patient wants to know if it is ok for her to get the covid vaccine?  Patient had covid 2/8/21 and called the covid hotline to ask about this information and was told to call PCP room air

## 2021-03-16 ENCOUNTER — HOSPITAL ENCOUNTER (OUTPATIENT)
Dept: WOMENS IMAGING | Age: 41
Discharge: HOME | End: 2021-03-16
Payer: COMMERCIAL

## 2021-03-16 DIAGNOSIS — Z12.31 SCREENING MAMMOGRAM, ENCOUNTER FOR: ICD-10-CM

## 2021-03-16 PROCEDURE — 77063 BREAST TOMOSYNTHESIS BI: CPT

## 2021-04-22 ENCOUNTER — OFFICE VISIT (OUTPATIENT)
Dept: ORTHOPEDIC SURGERY | Age: 41
End: 2021-04-22
Payer: COMMERCIAL

## 2021-04-22 VITALS — WEIGHT: 278 LBS | BODY MASS INDEX: 46.32 KG/M2 | HEIGHT: 65 IN

## 2021-04-22 DIAGNOSIS — M23.92 LOCKED KNEE, LEFT: Primary | ICD-10-CM

## 2021-04-22 DIAGNOSIS — M23.204 DEGENERATIVE TEAR OF MEDIAL MENISCUS OF LEFT KNEE: ICD-10-CM

## 2021-04-22 DIAGNOSIS — M25.562 ACUTE PAIN OF LEFT KNEE: ICD-10-CM

## 2021-04-22 DIAGNOSIS — M17.12 PRIMARY OSTEOARTHRITIS OF LEFT KNEE: ICD-10-CM

## 2021-04-22 PROCEDURE — G8417 CALC BMI ABV UP PARAM F/U: HCPCS | Performed by: ORTHOPAEDIC SURGERY

## 2021-04-22 PROCEDURE — G8428 CUR MEDS NOT DOCUMENT: HCPCS | Performed by: ORTHOPAEDIC SURGERY

## 2021-04-22 PROCEDURE — 99214 OFFICE O/P EST MOD 30 MIN: CPT | Performed by: ORTHOPAEDIC SURGERY

## 2021-04-22 PROCEDURE — 1036F TOBACCO NON-USER: CPT | Performed by: ORTHOPAEDIC SURGERY

## 2021-04-22 NOTE — PROGRESS NOTES
KNEE VISIT      HISTORY OF PRESENT ILLNESS    Sabina Holden is a 39 y.o. female who presents for evaluation of new problem involving her left knee. She was seen several years ago for probably in 2012 for some patellar subluxation but over the last several months she has had increasingly severe pain with locking catching giving way stiffness. She cannot recall any specific history of injury. She has cerebral palsy that affects her right side mostly. ROS    Well-documented patient history form dated 4/22/2021  All other ROS negative except for above.     Past Surgical history    Past Surgical History:   Procedure Laterality Date    FOOT SURGERY      KNEE SURGERY Left 6/12/15    LEG SURGERY         PAST MEDICAL    Past Medical History:   Diagnosis Date    B[facility] disorder ([facility]     Cerebral palsy ([facility]     legs;mainly right    Depression     Hypertension        Allergies    Allergies   Allergen Reactions    Sulfa Antibiotics     Amoxicillin Rash    Ultram [Tramadol] Nausea And Vomiting    Vicodin [Hydrocodone-Acetaminophen] Nausea And Vomiting       Meds    Current Outpatient Medications   Medication Sig Dispense Refill    vitamin D (ERGOCALCIFEROL) 1.25 MG (84733 UT) CAPS capsule Take 1 capsule by mouth once a week 4 capsule 0    ARIPiprazole (ABILIFY) 10 MG tablet Take 10 mg by mouth daily      busPIRone (BUSPAR) 15 MG tablet Take 15 mg by mouth 2 in am; 1 qhs      metoprolol succinate (TOPROL XL) 50 MG extended release tablet TAKE 1 TABLET BY MOUTH DAILY blood pressure 90 tablet 3    lamoTRIgine (LAMICTAL) 200 MG tablet Take 200 mg by mouth nightly       hydrOXYzine (ATARAX) 50 MG tablet Take 50 mg by mouth 3 times daily as needed for Anxiety      prazosin (MINIPRESS) 2 MG capsule Take 2 mg by mouth nightly       escitalopram (LEXAPRO) 20 MG tablet TAKE ONE TABLET BY MOUTH DAILY (Patient taking differently: Take 20 mg by mouth daily TAKE ONE TABLET BY MOUTH DAILY) 30 tablet 3     No current facility-administered medications for this visit. Social    Social History     Socioeconomic History    Marital status:      Spouse name: Not on file    Number of children: Not on file    Years of education: Not on file    Highest education level: Not on file   Occupational History    Not on file   Social Needs    Financial resource strain: Not on file    Food insecurity     Worry: Not on file     Inability: Not on file    Transportation needs     Medical: Not on file     Non-medical: Not on file   Tobacco Use    Smoking status: Never Smoker    Smokeless tobacco: Never Used   Substance and Sexual Activity    Alcohol use: No     Alcohol/week: 0.0 standard drinks    Drug use: No    Sexual activity: Yes     Partners: Male   Lifestyle    Physical activity     Days per week: Not on file     Minutes per session: Not on file    Stress: Not on file   Relationships    Social connections     Talks on phone: Not on file     Gets together: Not on file     Attends Alevism service: Not on file     Active member of club or organization: Not on file     Attends meetings of clubs or organizations: Not on file     Relationship status: Not on file    Intimate partner violence     Fear of current or ex partner: Not on file     Emotionally abused: Not on file     Physically abused: Not on file     Forced sexual activity: Not on file   Other Topics Concern    Not on file   Social History Narrative    8/2012 lives with parents and 2 sons;and brother;unemployed; Family HISTORY    No family history on file. PHYSICAL EXAM    Vital Signs:  Ht 5' 5\" (1.651 m)   Wt 278 lb (126.1 kg)   BMI 46.26 kg/m²   General Appearance:  Normal body habitus. Alert and oriented to person, place, and time. Affect:  Normal.   Gait: Antalgic flexed knee gait on the left. Poor balance and coordination. Skin:  Intact. Sensation:  Intact.    Strength: Diminished secondary to pain and cerebral palsy Reflexes:  Intact. Pulses:  Intact. Knee Exam:    Effusion: 1+ left knee    Range of Motion Right Left   Extension 0 -8   Flexion 115 95     Provocative Test Right Left    Positive Negative Positive Negative   Anterior drawer [] [x] [] [x]   Lachman [] [x] [] [x]   Posterior drawer [] [x] [] [x]   Varus testing [] [x] [x] []   Valgus testing [] [x] [x] []   Joint line tenderness [x] [] [x] []     Additional Exam Comments: Her neurocirculatory lymphatic exam is basically unchanged from what it was in the past.  She has a cerebral palsy affects her right side and has no and exam consistent with a left locked knee with fluid pain with attempts of full extension etc.  She has no gross instability. She has a lot of pain medially with direct palpation stress and Yadira's maneuver. IMAGING STUDIES    X-rays 3 views of her left knee demonstrate grade 2 osteoarthritis which is tricompartmental but affects the patellofemoral joint to the greatest degree    IMPRESSION    Left locked knee secondary to degenerative medial meniscus tear and osteoarthritis    PLAN      1. Conservative care options including physical therapy, NSAIDs, bracing, and activity modification were discussed. 2.  The indications for therapeutic injections were discussed. 3.  The indications for additional imaging studies were discussed.    4.  After considering the various options discussed, the patient elected to pursue a course that includes an MRI of the left knee because it is locked at this point use her crutches for protection and return after testing

## 2021-04-27 ENCOUNTER — TELEPHONE (OUTPATIENT)
Dept: ORTHOPEDIC SURGERY | Age: 41
End: 2021-04-27

## 2021-04-30 ENCOUNTER — TELEPHONE (OUTPATIENT)
Dept: ORTHOPEDIC SURGERY | Age: 41
End: 2021-04-30

## 2021-05-05 ENCOUNTER — HOSPITAL ENCOUNTER (OUTPATIENT)
Dept: MRI IMAGING | Age: 41
Discharge: HOME | End: 2021-05-05
Payer: COMMERCIAL

## 2021-05-05 DIAGNOSIS — M25.562 ACUTE PAIN OF LEFT KNEE: ICD-10-CM

## 2021-05-05 PROCEDURE — 73721 MRI JNT OF LWR EXTRE W/O DYE: CPT

## 2021-05-17 ENCOUNTER — TELEPHONE (OUTPATIENT)
Dept: ORTHOPEDIC SURGERY | Age: 41
End: 2021-05-17

## 2021-05-17 NOTE — TELEPHONE ENCOUNTER
GAVE [facility]   ( Ranjit Guzman ) 06/18/2018 TO PRESENT TO DEMETRIUS FERNANDEZ TO SCAN IN [facility] TO [employer]

## 2021-05-18 ENCOUNTER — TELEPHONE (OUTPATIENT)
Dept: FAMILY MEDICINE CLINIC | Age: 41
End: 2021-05-18

## 2021-05-18 NOTE — TELEPHONE ENCOUNTER
Informed 's office that Dr Phoebe Henley has retired.  Nurse Practitioner can complete and sign the forms

## 2021-05-18 NOTE — TELEPHONE ENCOUNTER
Left message for patient to return call. Patient will need to make an appointment to do the forms with marsha.

## 2021-05-19 ENCOUNTER — OFFICE VISIT (OUTPATIENT)
Dept: ORTHOPEDIC SURGERY | Age: 41
End: 2021-05-19
Payer: COMMERCIAL

## 2021-05-19 ENCOUNTER — OFFICE VISIT (OUTPATIENT)
Dept: FAMILY MEDICINE CLINIC | Age: 41
End: 2021-05-19
Payer: COMMERCIAL

## 2021-05-19 VITALS — SYSTOLIC BLOOD PRESSURE: 118 MMHG | OXYGEN SATURATION: 96 % | HEART RATE: 92 BPM | DIASTOLIC BLOOD PRESSURE: 85 MMHG

## 2021-05-19 VITALS — HEIGHT: 65 IN | BODY MASS INDEX: 46.32 KG/M2 | WEIGHT: 278 LBS

## 2021-05-19 DIAGNOSIS — M25.562 CHRONIC PAIN OF LEFT KNEE: ICD-10-CM

## 2021-05-19 DIAGNOSIS — G80.8 OTHER CEREBRAL PALSY (HCC): Primary | ICD-10-CM

## 2021-05-19 DIAGNOSIS — F31.9 BIPOLAR DISEASE, CHRONIC (HCC): ICD-10-CM

## 2021-05-19 DIAGNOSIS — M23.92 LOCKED KNEE, LEFT: ICD-10-CM

## 2021-05-19 DIAGNOSIS — M21.70 UNEQUAL LEG LENGTH (ACQUIRED): ICD-10-CM

## 2021-05-19 DIAGNOSIS — M17.12 PATELLOFEMORAL ARTHRITIS OF LEFT KNEE: Primary | ICD-10-CM

## 2021-05-19 DIAGNOSIS — G89.29 CHRONIC PAIN OF LEFT KNEE: ICD-10-CM

## 2021-05-19 PROCEDURE — 1036F TOBACCO NON-USER: CPT | Performed by: NURSE PRACTITIONER

## 2021-05-19 PROCEDURE — 99212 OFFICE O/P EST SF 10 MIN: CPT | Performed by: ORTHOPAEDIC SURGERY

## 2021-05-19 PROCEDURE — G8427 DOCREV CUR MEDS BY ELIG CLIN: HCPCS | Performed by: NURSE PRACTITIONER

## 2021-05-19 PROCEDURE — 1036F TOBACCO NON-USER: CPT | Performed by: ORTHOPAEDIC SURGERY

## 2021-05-19 PROCEDURE — 99213 OFFICE O/P EST LOW 20 MIN: CPT | Performed by: NURSE PRACTITIONER

## 2021-05-19 PROCEDURE — G8417 CALC BMI ABV UP PARAM F/U: HCPCS | Performed by: NURSE PRACTITIONER

## 2021-05-19 PROCEDURE — G8417 CALC BMI ABV UP PARAM F/U: HCPCS | Performed by: ORTHOPAEDIC SURGERY

## 2021-05-19 PROCEDURE — G8428 CUR MEDS NOT DOCUMENT: HCPCS | Performed by: ORTHOPAEDIC SURGERY

## 2021-05-19 RX ORDER — BENZTROPINE MESYLATE 0.5 MG/1
0.5 TABLET ORAL DAILY
COMMUNITY
End: 2022-02-08 | Stop reason: CLARIF

## 2021-05-19 ASSESSMENT — ENCOUNTER SYMPTOMS
COUGH: 0
BLOOD IN STOOL: 0
DIARRHEA: 0
SHORTNESS OF BREATH: 0
CONSTIPATION: 0
CHEST TIGHTNESS: 0

## 2021-05-19 NOTE — PROGRESS NOTES
5/19/2021    Chief Complaint   Patient presents with    Forms     diability    Manic Behavior    Knee Pain       Claude Banegas is a 39 y.o. female, presents today for:    Doing well today. No new questions/ concerns. Currently filing for Disability through [employer], needing forms completed today. Bipolar/ Depression: Well controlled today. Continues to see [facility] for this. Continuing Cogentin, Abilify, Buspirone, Escitalopram.  No SI/ HI. Has good relationships with others. Sleeping around 6-8 hours/ night, waking feeling rested. Weight has been stable since last visit. Chronic Left Knee Pain: Worsening over the past several months. Primary cause due to leg length discrepancy and gait abnormalities due to cerebral Palsy. Prior surgery done by Dr. Ashlyn Tang several years ago. Seeing Dr. Ashlyn Tang today to discuss additional options for care due to pain. No recent trauma. No swelling/ warmth/ redness. Cerebral Palsy: No new issues. Continues to have difficulty stooping, crouching, lifting and carrying objects due to chronic lower extremity weakness. Left leg holds majority of weight while right leg is very weak. Review of Systems   Constitutional: Positive for unexpected weight change (gain). Respiratory: Negative for cough, chest tightness and shortness of breath. Cardiovascular: Negative. Gastrointestinal: Negative for blood in stool, constipation and diarrhea. Skin: Negative. Neurological: Negative for dizziness, tremors, light-headedness and headaches. Psychiatric/Behavioral: Negative for decreased concentration, dysphoric mood, self-injury, sleep disturbance and suicidal ideas. The patient is not nervous/anxious.         Current Outpatient Medications on File Prior to Visit   Medication Sig Dispense Refill    benztropine (COGENTIN) 0.5 MG tablet Take 0.5 mg by mouth daily      vitamin D (ERGOCALCIFEROL) 1.25 MG (63719 UT) CAPS capsule Take 1 capsule by mouth once a week 4 capsule 0    ARIPiprazole (ABILIFY) 10 MG tablet Take 10 mg by mouth daily      busPIRone (BUSPAR) 15 MG tablet Take 15 mg by mouth 2 in am; 1 qhs      metoprolol succinate (TOPROL XL) 50 MG extended release tablet TAKE 1 TABLET BY MOUTH DAILY blood pressure 90 tablet 3    lamoTRIgine (LAMICTAL) 200 MG tablet Take 200 mg by mouth nightly       hydrOXYzine (ATARAX) 50 MG tablet Take 50 mg by mouth 3 times daily as needed for Anxiety      prazosin (MINIPRESS) 2 MG capsule Take 2 mg by mouth nightly       escitalopram (LEXAPRO) 20 MG tablet TAKE ONE TABLET BY MOUTH DAILY (Patient taking differently: Take 20 mg by mouth daily TAKE ONE TABLET BY MOUTH DAILY) 30 tablet 3     No current facility-administered medications on file prior to visit. Allergies   Allergen Reactions    Sulfa Antibiotics     Amoxicillin Rash    Ultram [Tramadol] Nausea And Vomiting    Vicodin [Hydrocodone-Acetaminophen] Nausea And Vomiting     Past Medical History:   Diagnosis Date    [facility] ([facility]     Cerebral palsy (HCC)     legs;mainly right    Depression     Hypertension      Past Surgical History:   Procedure Laterality Date    FOOT SURGERY      KNEE SURGERY Left 6/12/15    LEG SURGERY        Social History     Tobacco Use    Smoking status: Never Smoker    Smokeless tobacco: Never Used   Substance Use Topics    Alcohol use: No     Alcohol/week: 0.0 standard drinks      History reviewed. No pertinent family history. Vitals:    05/19/21 1437   BP: 118/85   Pulse: 92   SpO2: 96%     Estimated body mass index is 46.26 kg/m² as calculated from the following:    Height as of 6/1/21: 5' 5\" (1.651 m). Weight as of 6/1/21: 278 lb (126.1 kg). Physical Exam  Vitals reviewed. Constitutional:       Appearance: Normal appearance. HENT:      Head: Normocephalic. Neck:      Vascular: No carotid bruit. Cardiovascular:      Rate and Rhythm: Normal rate and regular rhythm.       Pulses: Normal pulses. Heart sounds: Normal heart sounds. No murmur heard. No gallop. Pulmonary:      Effort: Pulmonary effort is normal.      Breath sounds: Normal breath sounds. Abdominal:      General: Abdomen is flat. Palpations: Abdomen is soft. Musculoskeletal:      Comments: Fine tremor right hand, Antalgic gait, with limping favoring left knee   Skin:     General: Skin is warm and dry. Capillary Refill: Capillary refill takes less than 2 seconds. Neurological:      General: No focal deficit present. Mental Status: She is alert and oriented to person, place, and time. Psychiatric:         Mood and Affect: Mood normal.         Behavior: Behavior normal.         ASSESSMENT/PLAN:  1. Other cerebral palsy [facility])  Disability forms completed today  Continue to monitor symptoms    2. Bipolar disease, chronic ([facility]  Continue care with [employer]  Continue Cogentin, Abilify, Buspirone, Escitalopram  Continue to seek support from family/ friends    3. Unequal leg length (acquired)  Continue care with Dr. Dorice Favre    4. Chronic pain of left knee  Continue care with Dr. Dorice Favre, appreciate assistance. Return for keep 7/20/21. Patient's questions answered and concerns addressed. Patient agrees to plan of care.           Electronically signed by HENRY Stoddard CNP on 6/12/2021 at 10:51 AM

## 2021-05-19 NOTE — PROGRESS NOTES
5/19/2021    Chief Complaint   Patient presents with   June Lopez is a 39 y.o. female, presents today for:    HPI     Review of Systems   Constitutional: Positive for unexpected weight change (gain). Respiratory: Negative for cough, chest tightness and shortness of breath. Cardiovascular: Negative. Gastrointestinal: Negative for blood in stool, constipation and diarrhea. Skin: Negative. Neurological: Negative for dizziness, tremors, light-headedness and headaches. Psychiatric/Behavioral: Negative for decreased concentration, dysphoric mood, self-injury, sleep disturbance and suicidal ideas. The patient is not nervous/anxious. Current Outpatient Medications on File Prior to Visit   Medication Sig Dispense Refill    benztropine (COGENTIN) 0.5 MG tablet Take 0.5 mg by mouth daily      vitamin D (ERGOCALCIFEROL) 1.25 MG (06695 UT) CAPS capsule Take 1 capsule by mouth once a week 4 capsule 0    ARIPiprazole (ABILIFY) 10 MG tablet Take 10 mg by mouth daily      busPIRone (BUSPAR) 15 MG tablet Take 15 mg by mouth 2 in am; 1 qhs      metoprolol succinate (TOPROL XL) 50 MG extended release tablet TAKE 1 TABLET BY MOUTH DAILY blood pressure 90 tablet 3    lamoTRIgine (LAMICTAL) 200 MG tablet Take 200 mg by mouth nightly       hydrOXYzine (ATARAX) 50 MG tablet Take 50 mg by mouth 3 times daily as needed for Anxiety      prazosin (MINIPRESS) 2 MG capsule Take 2 mg by mouth nightly       escitalopram (LEXAPRO) 20 MG tablet TAKE ONE TABLET BY MOUTH DAILY (Patient taking differently: Take 20 mg by mouth daily TAKE ONE TABLET BY MOUTH DAILY) 30 tablet 3     No current facility-administered medications on file prior to visit.       Allergies   Allergen Reactions    Sulfa Antibiotics     Amoxicillin Rash    Ultram [Tramadol] Nausea And Vomiting    Vicodin [Hydrocodone-Acetaminophen] Nausea And Vomiting     Past Medical History:   Diagnosis Date    B[facility] disorder ([facility]     care.          Electronically signed by HENRY Vargas CNP on 5/19/2021 at 3:02 PM

## 2021-06-01 ENCOUNTER — OFFICE VISIT (OUTPATIENT)
Dept: ORTHOPEDIC SURGERY | Age: 41
End: 2021-06-01
Payer: COMMERCIAL

## 2021-06-01 VITALS — BODY MASS INDEX: 46.32 KG/M2 | HEIGHT: 65 IN | WEIGHT: 278 LBS

## 2021-06-01 DIAGNOSIS — M17.12 PATELLOFEMORAL ARTHRITIS OF LEFT KNEE: ICD-10-CM

## 2021-06-01 DIAGNOSIS — R52 PAIN: Primary | ICD-10-CM

## 2021-06-01 PROCEDURE — 99215 OFFICE O/P EST HI 40 MIN: CPT | Performed by: ORTHOPAEDIC SURGERY

## 2021-06-01 PROCEDURE — 1036F TOBACCO NON-USER: CPT | Performed by: ORTHOPAEDIC SURGERY

## 2021-06-01 PROCEDURE — 20610 DRAIN/INJ JOINT/BURSA W/O US: CPT | Performed by: ORTHOPAEDIC SURGERY

## 2021-06-01 PROCEDURE — G8427 DOCREV CUR MEDS BY ELIG CLIN: HCPCS | Performed by: ORTHOPAEDIC SURGERY

## 2021-06-01 PROCEDURE — G8417 CALC BMI ABV UP PARAM F/U: HCPCS | Performed by: ORTHOPAEDIC SURGERY

## 2021-06-01 RX ORDER — METHYLPREDNISOLONE ACETATE 40 MG/ML
40 INJECTION, SUSPENSION INTRA-ARTICULAR; INTRALESIONAL; INTRAMUSCULAR; SOFT TISSUE ONCE
Status: COMPLETED | OUTPATIENT
Start: 2021-06-01 | End: 2021-06-01

## 2021-06-01 RX ORDER — BUPIVACAINE HYDROCHLORIDE 5 MG/ML
4 INJECTION, SOLUTION PERINEURAL ONCE
Status: COMPLETED | OUTPATIENT
Start: 2021-06-01 | End: 2021-06-01

## 2021-06-01 RX ADMIN — BUPIVACAINE HYDROCHLORIDE 20 MG: 5 INJECTION, SOLUTION PERINEURAL at 11:31

## 2021-06-01 RX ADMIN — METHYLPREDNISOLONE ACETATE 40 MG: 40 INJECTION, SUSPENSION INTRA-ARTICULAR; INTRALESIONAL; INTRAMUSCULAR; SOFT TISSUE at 11:34

## 2021-06-02 NOTE — PROGRESS NOTES
6/1/2021     Reason for visit:  Left knee pain    History of Present Illness: The patient is a 42-year-old female who presents for evaluation of her left knee. She reports on and off pain for several years. No recent traumatic injury. She underwent knee arthroscopy back in 2015. Since then she has had intermittent cortisone as well as hyaluronic acid injections. Last cortisone injection was given 2 months ago and has provided relief and is still helping. When she does develop pain it is diffuse about the knee including anterior, deep, and medial.  The pain is made worse with standing, walking, stairs. No catching or locking. No instability. Medical History:  Past Medical History:   Diagnosis Date    Bipolar disorder ([address]     Cerebral palsy ([facility]     legs;mainly right    Depression     Hypertension       Past Surgical History:   Procedure Laterality Date    FOOT SURGERY      KNEE SURGERY Left 6/12/15    LEG SURGERY        No family history on file. Social History     Socioeconomic History    Marital status:      Spouse name: Not on file    Number of children: Not on file    Years of education: Not on file    Highest education level: Not on file   Occupational History    Not on file   Tobacco Use    Smoking status: Never Smoker    Smokeless tobacco: Never Used   Substance and Sexual Activity    Alcohol use: No     Alcohol/week: 0.0 standard drinks    Drug use: No    Sexual activity: Yes     Partners: Male   Other Topics Concern    Not on file   Social History Narrative    8/2012 lives with parents and 2 sons;and brother;unemployed; Social Determinants of Health     Financial Resource Strain:     Difficulty of Paying Living Expenses:    Food Insecurity:     Worried About Running Out of Food in the Last Year:     [address] in the Last Year:    Transportation Needs:     Lack of Transportation (Medical):      Lack of Transportation (Non-Medical):    Physical Activity:     Days of Exercise per Week:     Minutes of Exercise per Session:    Stress:     Feeling of Stress :    Social Connections:     Frequency of Communication with Friends and Family:     Frequency of Social Gatherings with Friends and Family:     Attends [employer] Services:     Active Member of Clubs or Organizations:     Attends Club or Organization Meetings:     Marital Status:    Intimate Partner Violence:     Fear of Current or Ex-Partner:     Emotionally Abused:     Physically Abused:     Sexually Abused:       Current Outpatient Medications on File Prior to Visit   Medication Sig Dispense Refill    benztropine (COGENTIN) 0.5 MG tablet Take 0.5 mg by mouth daily      vitamin D (ERGOCALCIFEROL) 1.25 MG (13953 UT) CAPS capsule Take 1 capsule by mouth once a week 4 capsule 0    ARIPiprazole (ABILIFY) 10 MG tablet Take 10 mg by mouth daily      busPIRone (BUSPAR) 15 MG tablet Take 15 mg by mouth 2 in am; 1 qhs      metoprolol succinate (TOPROL XL) 50 MG extended release tablet TAKE 1 TABLET BY MOUTH DAILY blood pressure 90 tablet 3    lamoTRIgine (LAMICTAL) 200 MG tablet Take 200 mg by mouth nightly       hydrOXYzine (ATARAX) 50 MG tablet Take 50 mg by mouth 3 times daily as needed for Anxiety      prazosin (MINIPRESS) 2 MG capsule Take 2 mg by mouth nightly       escitalopram (LEXAPRO) 20 MG tablet TAKE ONE TABLET BY MOUTH DAILY (Patient taking differently: Take 20 mg by mouth daily TAKE ONE TABLET BY MOUTH DAILY) 30 tablet 3     No current facility-administered medications on file prior to visit. Allergies   Allergen Reactions    Sulfa Antibiotics     Amoxicillin Rash    Ultram [Tramadol] Nausea And Vomiting    Vicodin [Hydrocodone-Acetaminophen] Nausea And Vomiting        Review of Systems:  Constitutional: Patient is adequately groomed with no evidence of malnutrition  Mental Status: The patient is oriented to time, place and person.   The patient's mood and affect are modification, anti-inflammatory medications, physical therapy, and injections. In future we can to new cortisone injections and/or hyaluronic acid injections as needed. From a surgical standpoint she may be a candidate for patellofemoral versus total knee arthroplasty. The patient elected proceed with cortisone injection. Therefore injection was given to left via sterile technique. The injection consisted of 40 mg of Depo-Medrol combined with 4 mL of 0.5% Marcaine. The patient tolerated this well. Greater than 40 minutes were spent with this encounter. Time spent included evaluating the patient's chart prior to arrival.  Evaluating the patient in the office including history, physical examination, imaging reviewing, and counseling on next steps. Lastly, time was spent discussing orders with my staff as well as providing documentation in the chart. Hubert Hirsch MD            Orthopaedic Surgery Sports Medicine and [address] and [facility]            Team Physician [facility]      Disclaimer: This note was dictated with voice recognition software. Though review and correction are routine, we apologize for any errors.

## 2021-06-17 ENCOUNTER — TELEPHONE (OUTPATIENT)
Dept: ORTHOPEDIC SURGERY | Age: 41
End: 2021-06-17

## 2021-06-17 DIAGNOSIS — M17.12 PATELLOFEMORAL ARTHRITIS OF LEFT KNEE: Primary | ICD-10-CM

## 2021-06-17 DIAGNOSIS — M17.12 PRIMARY OSTEOARTHRITIS OF LEFT KNEE: ICD-10-CM

## 2021-06-17 NOTE — TELEPHONE ENCOUNTER
PT states she has increased pain in LT knee following cortisone inj. Req gel inj.     Gel one ordered.

## 2021-06-18 ENCOUNTER — TELEPHONE (OUTPATIENT)
Dept: ORTHOPEDIC SURGERY | Age: 41
End: 2021-06-18

## 2021-06-18 NOTE — TELEPHONE ENCOUNTER
General Question     Subject: CALLING TO SEE INJECTIONS WAS APPROVED  Patient and /or Facility Request: PATIENT  Contact Number: [phone]

## 2021-06-22 ENCOUNTER — TELEPHONE (OUTPATIENT)
Dept: ORTHOPEDIC SURGERY | Age: 41
End: 2021-06-22

## 2021-06-22 DIAGNOSIS — M17.12 PATELLOFEMORAL ARTHRITIS OF LEFT KNEE: Primary | ICD-10-CM

## 2021-06-22 NOTE — TELEPHONE ENCOUNTER
----- Message from Oscar Marie MA sent at 6/22/2021  9:14 AM EDT -----  Regarding: DR Braulio Blackwell / RE: Winnie Reyes! [employer] only coveres Nancy, Anny-3 and Marika.       Please advise and I will submit the request.      Thank you - Will Abel

## 2021-07-07 NOTE — TELEPHONE ENCOUNTER
07/07/2021  DUROLANE   (QTY 1)   LEFT KNEE. APPROVED FOR BUY & BILL # [id number]. DATES:  06/23/2021 - 12/23/2021. PER FAX FROM [employer]. NOTE: Original request was for non-preferred drug GEL-ONE . Okay to switch to preferred drug DUROLANE, Per SAIMA.  AP

## 2021-07-13 ENCOUNTER — OFFICE VISIT (OUTPATIENT)
Dept: ORTHOPEDIC SURGERY | Age: 41
End: 2021-07-13
Payer: COMMERCIAL

## 2021-07-13 VITALS — BODY MASS INDEX: 46.32 KG/M2 | HEIGHT: 65 IN | WEIGHT: 278 LBS

## 2021-07-13 DIAGNOSIS — M17.12 PRIMARY OSTEOARTHRITIS OF LEFT KNEE: Primary | ICD-10-CM

## 2021-07-13 PROCEDURE — 20610 DRAIN/INJ JOINT/BURSA W/O US: CPT | Performed by: ORTHOPAEDIC SURGERY

## 2021-07-13 PROCEDURE — G8417 CALC BMI ABV UP PARAM F/U: HCPCS | Performed by: ORTHOPAEDIC SURGERY

## 2021-07-13 PROCEDURE — 1036F TOBACCO NON-USER: CPT | Performed by: ORTHOPAEDIC SURGERY

## 2021-07-13 PROCEDURE — G8427 DOCREV CUR MEDS BY ELIG CLIN: HCPCS | Performed by: ORTHOPAEDIC SURGERY

## 2021-07-13 PROCEDURE — 99213 OFFICE O/P EST LOW 20 MIN: CPT | Performed by: ORTHOPAEDIC SURGERY

## 2021-07-14 NOTE — PROGRESS NOTES
7/13/2021     Reason for visit:  Left knee pain    History of Present Illness: The patient is a 57-year-old female who presents for evaluation of her left knee. She reports on and off pain for several years. No recent traumatic injury. She underwent knee arthroscopy back in 2015. Since then she has had intermittent cortisone as well as hyaluronic acid injections. Last cortisone injection was given 2 months ago and has provided relief and is still helping. When she does develop pain it is diffuse about the knee including anterior, deep, and medial.  The pain is made worse with standing, walking, stairs. No catching or locking. No instability. Last visit cortisone injection was given. It only provided transient relief. She now has pain once again. Objective:  Ht 5' 5\" (1.651 m)   Wt 278 lb (126.1 kg)   BMI 46.26 kg/m²      Physical Exam:  The patient is well-appearing and in no apparent distress  Examination of the left knee   There is a trace effusion, no gross deformity or skin changes  Range of motion reveals 0 to 130  neg lachman, negative posterior drawer, no pain or laxity with varus or valgus stress at 0 degrees and 30 degrees of flexion  + joint line tenderness  5 out of 5 strength throughout distal muscle groups  Sensation is intact to light touch throughout all distributions  There is no calf swelling or tenderness  Palpable DP pulse, brisk cap refill, 2+ symmetric reflexes    Imaging:  X-rays of the left knee were reviewed. There is no fracture or dislocation. Tricompartmental degenerative changes present which are most pronounced in the patellofemoral joint. A sunrise view x-ray of the left knee was obtained in the office today on 6/1/2021 demonstrates degenerative changes. MRI of the left knee was reviewed. Tricompartmental degenerative changes which are most pronounced in patellofemoral joint.   Chronic degeneration of the medial meniscus with extrusion of the body.    Assessment:  Left knee osteoarthritis predominately involving the patellofemoral joint    Plan:  I discussed with the patient the diagnosis and treatment options. We discussed operative and nonoperative management. At this point I do recommend nonoperative management. Nonoperative treatment options include activity modification, anti-inflammatory medications, physical therapy, and injections. We elected proceed with hyaluronic acid injection. Therefore Durolane injection was given. She tolerated this well. She will return as needed for repeat cortisone or hyaluronic acid. .  From a surgical standpoint she may be a candidate for patellofemoral versus total knee arthroplasty. Madonna English MD            Orthopaedic Surgery Sports Medicine and [address] and [address] [facility]            Team Physician [facility] ([place]      Disclaimer: This note was dictated with voice recognition software. Though review and correction are routine, we apologize for any errors.

## 2021-07-20 ENCOUNTER — OFFICE VISIT (OUTPATIENT)
Dept: FAMILY MEDICINE CLINIC | Age: 41
End: 2021-07-20
Payer: COMMERCIAL

## 2021-07-20 VITALS
TEMPERATURE: 97.3 F | SYSTOLIC BLOOD PRESSURE: 122 MMHG | HEART RATE: 96 BPM | BODY MASS INDEX: 45.6 KG/M2 | DIASTOLIC BLOOD PRESSURE: 82 MMHG | OXYGEN SATURATION: 96 % | WEIGHT: 274 LBS

## 2021-07-20 DIAGNOSIS — I10 ESSENTIAL HYPERTENSION: Primary | ICD-10-CM

## 2021-07-20 DIAGNOSIS — G80.8 OTHER CEREBRAL PALSY (HCC): ICD-10-CM

## 2021-07-20 DIAGNOSIS — F31.9 BIPOLAR DISEASE, CHRONIC (HCC): ICD-10-CM

## 2021-07-20 DIAGNOSIS — M25.562 CHRONIC PAIN OF LEFT KNEE: ICD-10-CM

## 2021-07-20 DIAGNOSIS — G89.29 CHRONIC PAIN OF LEFT KNEE: ICD-10-CM

## 2021-07-20 PROCEDURE — 1036F TOBACCO NON-USER: CPT | Performed by: NURSE PRACTITIONER

## 2021-07-20 PROCEDURE — G8417 CALC BMI ABV UP PARAM F/U: HCPCS | Performed by: NURSE PRACTITIONER

## 2021-07-20 PROCEDURE — G8427 DOCREV CUR MEDS BY ELIG CLIN: HCPCS | Performed by: NURSE PRACTITIONER

## 2021-07-20 PROCEDURE — 99213 OFFICE O/P EST LOW 20 MIN: CPT | Performed by: NURSE PRACTITIONER

## 2021-07-20 RX ORDER — METOPROLOL SUCCINATE 50 MG/1
TABLET, EXTENDED RELEASE ORAL
Qty: 30 TABLET | Refills: 5 | Status: SHIPPED | OUTPATIENT
Start: 2021-07-20 | End: 2022-01-06

## 2021-07-20 ASSESSMENT — ENCOUNTER SYMPTOMS
COUGH: 0
CONSTIPATION: 0
CHEST TIGHTNESS: 0
BLOOD IN STOOL: 0
SHORTNESS OF BREATH: 0
DIARRHEA: 0

## 2021-07-20 NOTE — PROGRESS NOTES
7/20/2021    Chief Complaint   Patient presents with    Hypertension     6 mo fu     Manic Behavior    Depression       ASSESSMENT/PLAN:  1. Essential hypertension  Stable  Continue Toprol XL  Encouraged heart healthy diet  Encouraged 30 min activity daily  Encouraged weight loss- 4 lbs less since last visit  Reviewed labs from 1/2021    - metoprolol succinate (TOPROL XL) 50 MG extended release tablet; TAKE 1 TABLET BY MOUTH DAILY blood pressure  Dispense: 30 tablet; Refill: 5    2. Other cerebral palsy [facility])  Disability forms recently completed. Continue to monitor symptoms    3. Bipolar disease, chronic ([address]  Continue care with [facility]  Continue Cogentin, Abilify, Buspirone, Escitalopram  Continue to seek support from family/ friends    4. Chronic pain of left knee  Reccent Durolane injection per Dr. Luz Rangel 7/13/21, appreciate assistance    Follow up in 6 months. Aron Mendoza is a 39 y.o. female, presents today for:    Doing well today. No new questions/ concerns. Recently received knee injection with Ortho- not working as well as what she would like. HTN: No CP, SOB, leg swelling, orthopnea, PND. Tolerating meds well.  not checking BP at home. watching diet intake. Limited physical activity due to knee pain. Has lost 4 lbs since last visit    Bipolar/ Depression: Well controlled today. Continues to see [facility] for this. Continuing Cogentin, Abilify, Buspirone, Escitalopram.  No SI/ HI. Has good relationships with others. Sleeping around 6-8 hours/ night, waking feeling rested. Weight has been stable since last visit. Chronic Left Knee Pain: Now established with Dr. Luz Rangel, hoping that nonsugical options will help with pain. Worsening over the past several months. Primary cause due to leg length discrepancy and gait abnormalities due to cerebral Palsy. Prior surgery done by Dr. Tal Francois several years ago.   Seeing Dr. Tal Francois today to discuss additional options for regular rhythm. Pulses: Normal pulses. Heart sounds: Normal heart sounds. No murmur heard. No gallop. Pulmonary:      Effort: Pulmonary effort is normal.      Breath sounds: Normal breath sounds. Abdominal:      General: Abdomen is flat. Palpations: Abdomen is soft. Musculoskeletal:      Comments: Fine tremor right hand, Antalgic gait, with limping favoring left knee   Skin:     General: Skin is warm and dry. Capillary Refill: Capillary refill takes less than 2 seconds. Neurological:      General: No focal deficit present. Mental Status: She is alert and oriented to person, place, and time. Psychiatric:         Mood and Affect: Mood normal.         Behavior: Behavior normal.       Patient's questions answered and concerns addressed. Patient agrees to plan of care.           Electronically signed by HENRY Olmos CNP on 7/20/2021 at 11:40 AM

## 2022-01-06 DIAGNOSIS — I10 ESSENTIAL HYPERTENSION: ICD-10-CM

## 2022-01-06 RX ORDER — METOPROLOL SUCCINATE 50 MG/1
TABLET, EXTENDED RELEASE ORAL
Qty: 28 TABLET | Refills: 0 | Status: SHIPPED | OUTPATIENT
Start: 2022-01-06 | End: 2022-02-02

## 2022-01-18 ENCOUNTER — VIRTUAL VISIT (OUTPATIENT)
Dept: FAMILY MEDICINE CLINIC | Age: 42
End: 2022-01-18
Payer: COMMERCIAL

## 2022-01-18 DIAGNOSIS — J02.9 ACUTE PHARYNGITIS, UNSPECIFIED ETIOLOGY: Primary | ICD-10-CM

## 2022-01-18 PROCEDURE — G8417 CALC BMI ABV UP PARAM F/U: HCPCS

## 2022-01-18 PROCEDURE — G8484 FLU IMMUNIZE NO ADMIN: HCPCS

## 2022-01-18 PROCEDURE — 99213 OFFICE O/P EST LOW 20 MIN: CPT

## 2022-01-18 PROCEDURE — 1036F TOBACCO NON-USER: CPT

## 2022-01-18 PROCEDURE — G8427 DOCREV CUR MEDS BY ELIG CLIN: HCPCS

## 2022-01-18 RX ORDER — CEPHALEXIN 500 MG/1
500 CAPSULE ORAL 2 TIMES DAILY
Qty: 14 CAPSULE | Refills: 0 | Status: SHIPPED | OUTPATIENT
Start: 2022-01-18 | End: 2022-01-25

## 2022-01-18 ASSESSMENT — PATIENT HEALTH QUESTIONNAIRE - PHQ9
2. FEELING DOWN, DEPRESSED OR HOPELESS: 2
SUM OF ALL RESPONSES TO PHQ QUESTIONS 1-9: 2
SUM OF ALL RESPONSES TO PHQ9 QUESTIONS 1 & 2: 2
1. LITTLE INTEREST OR PLEASURE IN DOING THINGS: 0
SUM OF ALL RESPONSES TO PHQ QUESTIONS 1-9: 2

## 2022-01-18 ASSESSMENT — ENCOUNTER SYMPTOMS
SWOLLEN GLANDS: 0
VOMITING: 0
SORE THROAT: 1

## 2022-01-18 NOTE — PROGRESS NOTES
Sabina Holden (:  1980) is a 39 y.o. female,Established patient, here for evaluation of the following chief complaint(s): Pharyngitis (sx started saturday , pt has not been tested / pt son tested positive for strep this past thursday ) and Nasal Congestion         ASSESSMENT/PLAN:  1. Acute pharyngitis, unspecified etiology  -Likely to be strep throat considering that patient's son had tested +3 days prior.  -We will swab for COVID and flu as well  -Keflex ordered, patient is allergic to amoxicillin with rash    -     COVID-19; Future  -     RAPID INFLUENZA A/B ANTIGENS; Future  -     STREP SCREEN GROUP A THROAT      Return if symptoms worsen or fail to improve, for keep appointment with [facility] on . SUBJECTIVE/OBJECTIVE:  Patient seen virtually today for CC of sore throat started Saturday. Patient's son recently had positive test strep on Thursday. He was negative for covid. She began to develop symptoms shortly after her son was swabbed for strep. Denies     Pharyngitis  This is a new problem. The current episode started in the past 7 days. The problem occurs constantly. The problem has been unchanged. Associated symptoms include congestion and a sore throat. Pertinent negatives include no arthralgias, chills, fever, swollen glands, vomiting or weakness. The symptoms are aggravated by swallowing. She has tried NSAIDs for the symptoms. The treatment provided mild relief. Review of Systems   Constitutional: Negative for chills and fever. HENT: Positive for congestion and sore throat. Gastrointestinal: Negative for vomiting. Musculoskeletal: Negative for arthralgias. Neurological: Negative for weakness. No flowsheet data found.      Physical Exam    [INSTRUCTIONS:  \"[x]\" Indicates a positive item  \"[]\" Indicates a negative item  -- DELETE ALL ITEMS NOT EXAMINED]    Constitutional: [x] Appears well-developed and well-nourished [x] No apparent distress      [] Abnormal -     Mental status: [x] Alert and awake  [x] Oriented to person/place/time [x] Able to follow commands    [] Abnormal -     Eyes:   EOM    [x]  Normal    [] Abnormal -   Sclera  [x]  Normal    [] Abnormal -          Discharge [x]  None visible   [] Abnormal -     HENT: [x] Normocephalic, atraumatic  [] Abnormal -   [x] Mouth/Throat: Mucous membranes are moist    External Ears [x] Normal  [] Abnormal -    Neck: [x] No visualized mass [] Abnormal -     Pulmonary/Chest: [x] Respiratory effort normal   [x] No visualized signs of difficulty breathing or respiratory distress        [] Abnormal -      Musculoskeletal:   [x] Normal gait with no signs of ataxia         [x] Normal range of motion of neck        [] Abnormal -     Neurological:        [x] No Facial Asymmetry (Cranial nerve 7 motor function) (limited exam due to video visit)          [x] No gaze palsy        [] Abnormal -          Skin:        [x] No significant exanthematous lesions or discoloration noted on facial skin         [] Abnormal -            Psychiatric:       [x] Normal Affect [] Abnormal -        [x] No Hallucinations    Other pertinent observable physical exam findings:-          On this date 1/18/2022 I have spent 25 minutes reviewing previous notes, test results and face to face (virtual) with the patient discussing the diagnosis and importance of compliance with the treatment plan as well as documenting on the day of the visitSonia Clarke was evaluated through a synchronous (real-time) audio-video encounter. The patient (or guardian if applicable) is aware that this is a billable service. Verbal consent to proceed has been obtained within the past 12 months. The visit was conducted pursuant to the emergency declaration under the [facility], [address] [place] [address] authority and the [employer] Act.   Patient identification was verified, and a caregiver was present when appropriate. The patient was located in a state where the provider was credentialed to provide care. An electronic signature was used to authenticate this note.     --HENRY Maldonado - CNP

## 2022-02-02 DIAGNOSIS — I10 ESSENTIAL HYPERTENSION: ICD-10-CM

## 2022-02-02 RX ORDER — METOPROLOL SUCCINATE 50 MG/1
TABLET, EXTENDED RELEASE ORAL
Qty: 28 TABLET | Refills: 0 | Status: SHIPPED | OUTPATIENT
Start: 2022-02-02 | End: 2022-02-08 | Stop reason: SDUPTHER

## 2022-02-02 NOTE — TELEPHONE ENCOUNTER
Kaley Bourne is requesting refill(s)   Last OV 7/20/21 (pertaining to medication)  LR 1/6/22 (per medication requested)  Next office visit scheduled or attempted Yes   If no, reason:  2/8/22  Sent one month to pharmacy pt has an upcoming appointment on 2/8/22 full weight-bearing

## 2022-02-08 ENCOUNTER — OFFICE VISIT (OUTPATIENT)
Dept: FAMILY MEDICINE CLINIC | Age: 42
End: 2022-02-08
Payer: COMMERCIAL

## 2022-02-08 VITALS
WEIGHT: 275 LBS | TEMPERATURE: 97.2 F | HEART RATE: 87 BPM | OXYGEN SATURATION: 97 % | DIASTOLIC BLOOD PRESSURE: 86 MMHG | BODY MASS INDEX: 45.76 KG/M2 | SYSTOLIC BLOOD PRESSURE: 128 MMHG

## 2022-02-08 DIAGNOSIS — I10 ESSENTIAL HYPERTENSION: ICD-10-CM

## 2022-02-08 DIAGNOSIS — G80.8 OTHER CEREBRAL PALSY (HCC): ICD-10-CM

## 2022-02-08 DIAGNOSIS — F31.9 BIPOLAR DISEASE, CHRONIC (HCC): ICD-10-CM

## 2022-02-08 DIAGNOSIS — K21.00 GASTROESOPHAGEAL REFLUX DISEASE WITH ESOPHAGITIS WITHOUT HEMORRHAGE: Primary | ICD-10-CM

## 2022-02-08 PROCEDURE — G8427 DOCREV CUR MEDS BY ELIG CLIN: HCPCS | Performed by: NURSE PRACTITIONER

## 2022-02-08 PROCEDURE — 99214 OFFICE O/P EST MOD 30 MIN: CPT | Performed by: NURSE PRACTITIONER

## 2022-02-08 PROCEDURE — 1036F TOBACCO NON-USER: CPT | Performed by: NURSE PRACTITIONER

## 2022-02-08 PROCEDURE — G8417 CALC BMI ABV UP PARAM F/U: HCPCS | Performed by: NURSE PRACTITIONER

## 2022-02-08 PROCEDURE — G8484 FLU IMMUNIZE NO ADMIN: HCPCS | Performed by: NURSE PRACTITIONER

## 2022-02-08 RX ORDER — METOPROLOL SUCCINATE 50 MG/1
50 TABLET, EXTENDED RELEASE ORAL DAILY
Qty: 30 TABLET | Refills: 11 | Status: SHIPPED | OUTPATIENT
Start: 2022-02-08

## 2022-02-08 RX ORDER — FAMOTIDINE 20 MG/1
20 TABLET, FILM COATED ORAL NIGHTLY PRN
Qty: 30 TABLET | Refills: 3 | Status: SHIPPED | OUTPATIENT
Start: 2022-02-08

## 2022-02-08 RX ORDER — BENZTROPINE MESYLATE 1 MG/1
1 TABLET ORAL DAILY
COMMUNITY

## 2022-02-08 ASSESSMENT — ENCOUNTER SYMPTOMS
ABDOMINAL PAIN: 0
CHOKING: 0
HOARSE VOICE: 0
SORE THROAT: 0
WATER BRASH: 0
NAUSEA: 0
GLOBUS SENSATION: 0
STRIDOR: 0
WHEEZING: 0
COUGH: 0
HEARTBURN: 1

## 2022-02-08 NOTE — PATIENT INSTRUCTIONS
Patient Education        Gastroesophageal Reflux Disease (GERD): Care Instructions  Overview     Gastroesophageal reflux disease (GERD) is the backward flow of stomach acid into the esophagus. The esophagus is the tube that leads from your throat to your stomach. A one-way valve prevents the stomach acid from backing up into this tube. But when you have GERD, this valve does not close tightly enough. This can also cause pain and swelling in your esophagus. (This is called esophagitis.)  If you have mild GERD symptoms including heartburn, you may be able to control the problem with antacids or over-the-counter medicine. You can also make lifestyle changes to help reduce your symptoms. These include changing your diet and eating habits, such as not eating late at night and losing weight. Follow-up care is a key part of your treatment and safety. Be sure to make and go to all appointments, and call your doctor if you are having problems. It's also a good idea to know your test results and keep a list of the medicines you take. How can you care for yourself at home? · Take your medicines exactly as prescribed. Call your doctor if you think you are having a problem with your medicine. · Your doctor may recommend over-the-counter medicine. For mild or occasional indigestion, antacids, such as Tums, Gaviscon, Mylanta, or Maalox, may help. Your doctor also may recommend over-the-counter acid reducers, such as famotidine (Pepcid AC), cimetidine (Tagamet HB), or omeprazole (Prilosec). Read and follow all instructions on the label. If you use these medicines often, talk with your doctor. · Change your eating habits. ? It's best to eat several small meals instead of two or three large meals. ? After you eat, wait 2 to 3 hours before you lie down. ? Avoid foods that make your symptoms worse.  These may include chocolate, mint, alcohol, pepper, spicy foods, high-fat foods, or drinks with caffeine in them, such as tea, coffee, marilee, or energy drinks. If your symptoms are worse after you eat a certain food, you may want to stop eating it to see if your symptoms get better. · Do not smoke or chew tobacco. Smoking can make GERD worse. If you need help quitting, talk to your doctor about stop-smoking programs and medicines. These can increase your chances of quitting for good. · If you have GERD symptoms at night, raise the head of your bed 6 to 8 inches by putting the frame on blocks or placing a foam wedge under the head of your mattress. (Adding extra pillows does not work.)  · Do not wear tight clothing around your middle. · Lose weight if you need to. Losing just 5 to 10 pounds can help. When should you call for help? Call your doctor now or seek immediate medical care if:    · You have new or different belly pain.     · Your stools are black and tarlike or have streaks of blood. Watch closely for changes in your health, and be sure to contact your doctor if:    · Your symptoms have not improved after 2 days.     · Food seems to catch in your throat or chest.   Where can you learn more? Go to https://IPWireless.Easy [employer] Shop. org and sign in to your [employer] account. Enter K902 in the [facility] box to learn more about \"Gastroesophageal Reflux Disease (GERD): Care Instructions. \"     If you do not have an account, please click on the \"Sign Up Now\" link. Current as of: September 8, 2021               Content Version: 13.1  © 2006-2021 [employer]. Care instructions adapted under license by [facility] ([facility]). If you have questions about a medical condition or this instruction, always ask your healthcare professional. [place] [address] any warranty or liability for your use of this information. Patient Education        Learning About Acid-Reducing Medicines  What are they? Acid-reducing medicines can help relieve heartburn and other symptoms of indigestion.  They can help prevent damage to your digestive system from stomach acids. They also are used to treat reflux and ulcer symptoms. These medicines include H2 blockers and proton pump inhibitors (PPIs). They help your stomach make less acid. You can buy them over the counter. Some of them also come in prescription strengths. Antacids can also help relieve heartburn symptoms. They reduce the acid that is already in your stomach. You can buy them over the counter. Which medicine is best for you depends on what is causing your symptoms. How do they work? Acid-reducing medicines work in two ways. H2 blockers and proton pump inhibitors (PPIs) lower the amount of acid your stomach makes. They don't work on the acid that's already there. Antacids work by making stomach juices less acidic. But your heartburn may come back as your stomach makes more acid. What are some examples? Examples of acid reducers include:  H2 blockers. · Tagamet (cimetidine)  · Pepcid (famotidine)  Proton pump inhibitors (PPIs). · Nexium (esomeprazole)  · Prevacid (lansoprazole)  · Prilosec, Zegerid (omeprazole)  · Protonix (pantoprazole)  · Aciphex (rabeprazole)  Antacids. · Gaviscon  · Mylanta  · Maalox  · Tums  What are side effects might you have? Many people don't have side effects. And minor side effects might go away after a while. H2 blockers can cause headaches or make you dizzy. They might cause diarrhea or constipation. You may have nausea and vomiting. PPIs can cause headaches and diarrhea. Using them for a long time may raise your risk for infections or broken bones. Some antacids can cause constipation or diarrhea. The brands vary in the ingredients they use. They can have different side effects. If you use too much heartburn medicine, your body may not get enough of some minerals from your food. How can you take these medicines safely? Some H2 blockers and PPIs can affect how other medicines work.  Tell your doctor if you use other medicines. He or she may change the dose or give you a different medicine. Many antacids have aspirin in them. Read the label to make sure that you don't take too much. Too much aspirin can be harmful. Be safe with medicines. Take your medicines exactly as prescribed. If you take over-the-counter medicine, be sure to read and follow all instructions on the label. Call your doctor if you think you are having a problem with your medicine. Check with your doctor or pharmacist before you use any other medicines. This includes over-the-counter medicines. Tell your doctor about all of the medicines, vitamins, herbal products, and supplements you take. Taking some medicines together can cause problems. Follow-up care is a key part of your treatment and safety. Be sure to make and go to all appointments, and call your doctor if you are having problems. It's also a good idea to know your test results and keep a list of the medicines you take. Where can you learn more? Go to https://[employer] Corppelawrencecityguruguevara.CodeSealer. org and sign in to your [employer] account. Enter [phone] in the [facility] box to learn more about \"Learning About Acid-Reducing Medicines. \"     If you do not have an account, please click on the \"Sign Up Now\" link. Current as of: September 8, 2021               Content Version: 13.1  © 1700-7293 Healthwise, Incorporated. Care instructions adapted under license by [facility] ([facility]). If you have questions about a medical condition or this instruction, always ask your healthcare professional. [place] [address] any warranty or liability for your use of this information.

## 2022-02-08 NOTE — PROGRESS NOTES
2/8/2022    Chief Complaint   Patient presents with    Hypertension     med refill     Other     cerebral palsey     Depression    Heartburn     this started about 2 months ago it can happen any time and when it is at its worse it makes her cough she has been doing tums but that is not helping        Ronald Hernandez is a 43 y.o. female, presents today for:      ASSESSMENT/PLAN:    1. Essential hypertension  Stable  Continue Toprol XL  Encouraged heart healthy diet  Encouraged 30 min activity daily  Encouraged weight loss- 4 lbs less since last visit  Reviewed labs from 1/2021  - metoprolol succinate (TOPROL XL) 50 MG extended release tablet; Take 1 tablet by mouth daily  Dispense: 30 tablet; Refill: 11    2. Gastroesophageal reflux disease with esophagitis without hemorrhage  Start Famotidine for heartburn  Encouraged to avoid heartburn inducing foods. Discussed sitting up for 2 hours after eating.    - famotidine (PEPCID) 20 MG tablet; Take 1 tablet by mouth nightly as needed (heartburn)  Dispense: 30 tablet; Refill: 3    3. Bipolar disease, chronic ([facility] [address]  Continue care with [facility]  Continue Cogentin, Abilify, Buspirone, Escitalopram  Continue to seek support from family/ friends    4. Other cerebral palsy ([facility] [address]   Continue to monitor symptoms      No follow-ups on file. Presenting today for chronic disease follow up and concern for heartburn  Heartburn  She complains of heartburn. She reports no abdominal pain, no choking, no coughing, no dysphagia, no early satiety, no globus sensation, no hoarse voice, no nausea, no sore throat, no stridor, no tooth decay, no water brash or no wheezing. This is a new problem. The current episode started more than 1 month ago. The problem occurs constantly. The problem has been gradually worsening. The heartburn duration is several minutes (right after she lays down, and when she wakes up). Heartburn location: throat. The heartburn is of moderate intensity.  The heartburn wakes her from sleep. The heartburn does not limit her activity. The heartburn doesn't change with position. Nothing aggravates the symptoms. Pertinent negatives include no anemia, fatigue, melena, muscle weakness, orthopnea or weight loss. Risk factors include lack of exercise and obesity. Treatments tried: TUMS. HTN: No CP, SOB, leg swelling, orthopnea, PND. Tolerating meds well.  not checking BP at home. watching diet intake. Limited physical activity due to knee pain. Has lost 4 lbs since last visit    Bipolar/ Depression: Well controlled today. Continues to see [facility] for this. Continuing Cogentin, Abilify, Buspirone, Escitalopram.  No SI/ HI. Has good relationships with others. Sleeping around 6-8 hours/ night, waking feeling rested. Weight has been stable since last visit. Cerebral Palsy: No new issues. Continues to have difficulty stooping, crouching, lifting and carrying objects due to chronic lower extremity weakness. Left leg holds majority of weight while right leg is very weak. Lab Results   Component Value Date     01/27/2021    K 4.4 01/27/2021     01/27/2021    CO2 22 01/27/2021    BUN 14 01/27/2021    CREATININE 0.9 01/27/2021    GLUCOSE 88 01/27/2021    CALCIUM 9.7 01/27/2021    PROT 7.3 01/27/2021    LABALBU 4.2 01/27/2021    BILITOT <0.2 01/27/2021    ALKPHOS 81 01/27/2021    AST 11 (L) 01/27/2021    ALT 9 (L) 01/27/2021    LABGLOM >60 01/27/2021    GFRAA >60 01/27/2021    AGRATIO 1.4 01/27/2021    GLOB 3.1 01/27/2021         Review of Systems   Constitutional: Negative for fatigue and weight loss. HENT: Negative for hoarse voice and sore throat. Respiratory: Negative for cough, choking and wheezing. Gastrointestinal: Positive for heartburn. Negative for abdominal pain, dysphagia, melena and nausea. Musculoskeletal: Negative for muscle weakness.        Current Outpatient Medications on File Prior to Visit   Medication Sig Dispense Refill  benztropine (COGENTIN) 1 MG tablet Take 1 mg by mouth daily      ARIPiprazole (ABILIFY) 10 MG tablet Take 10 mg by mouth daily      busPIRone (BUSPAR) 15 MG tablet Take 15 mg by mouth 2 in am; 1 qhs      lamoTRIgine (LAMICTAL) 200 MG tablet Take 200 mg by mouth nightly       prazosin (MINIPRESS) 2 MG capsule Take 2 mg by mouth nightly       escitalopram (LEXAPRO) 20 MG tablet TAKE ONE TABLET BY MOUTH DAILY (Patient taking differently: Take 20 mg by mouth daily TAKE ONE TABLET BY MOUTH DAILY) 30 tablet 3     No current facility-administered medications on file prior to visit. Allergies   Allergen Reactions    Sulfa Antibiotics     Amoxicillin Rash    Ultram [Tramadol] Nausea And Vomiting    Vicodin [Hydrocodone-Acetaminophen] Nausea And Vomiting     Past Medical History:   Diagnosis Date    [facility] ([facility] [address]     Cerebral palsy (HCC)     legs;mainly right    Depression     Hypertension      Past Surgical History:   Procedure Laterality Date    FOOT SURGERY      KNEE SURGERY Left 6/12/15    LEG SURGERY       Social History     Tobacco Use    Smoking status: Never Smoker    Smokeless tobacco: Never Used   Substance Use Topics    Alcohol use: No     Alcohol/week: 0.0 standard drinks     No family history on file. Vitals:    02/08/22 1633   BP: 128/86   Pulse: 87   Temp: 97.2 °F (36.2 °C)   TempSrc: Temporal   SpO2: 97%   Weight: 275 lb (124.7 kg)     Estimated body mass index is 45.76 kg/m² as calculated from the following:    Height as of 7/13/21: 5' 5\" (1.651 m). Weight as of this encounter: 275 lb (124.7 kg). Physical Exam  Vitals reviewed. Constitutional:       Appearance: Normal appearance. She is obese. HENT:      Head: Normocephalic. Neck:      Vascular: No carotid bruit. Cardiovascular:      Rate and Rhythm: Normal rate and regular rhythm. Pulses: Normal pulses. Carotid pulses are 2+ on the right side and 2+ on the left side.        Dorsalis pedis pulses are 2+ on the right side and 2+ on the left side. Posterior tibial pulses are 2+ on the right side and 2+ on the left side. Heart sounds: Normal heart sounds. No murmur heard. No gallop. Pulmonary:      Effort: Pulmonary effort is normal.      Breath sounds: Normal breath sounds. Abdominal:      General: Abdomen is flat. Palpations: Abdomen is soft. Tenderness: There is no abdominal tenderness. Hernia: No hernia is present. Musculoskeletal:         General: Normal range of motion. Cervical back: Normal range of motion. Right lower leg: No edema. Left lower leg: No edema. Lymphadenopathy:      Cervical: No cervical adenopathy. Skin:     General: Skin is warm and dry. Capillary Refill: Capillary refill takes less than 2 seconds. Neurological:      General: No focal deficit present. Mental Status: She is alert and oriented to person, place, and time. Psychiatric:         Mood and Affect: Mood normal.         Behavior: Behavior normal.           Patient's questions answered and concerns addressed. Patient agrees to plan of care.         Electronically signed by HENRY Prince CNP on 2/16/2022 at 7:16 AM

## 2022-02-28 ENCOUNTER — TELEMEDICINE (OUTPATIENT)
Dept: FAMILY MEDICINE CLINIC | Age: 42
End: 2022-02-28

## 2022-02-28 DIAGNOSIS — G80.8 OTHER CEREBRAL PALSY (HCC): Primary | ICD-10-CM

## 2022-02-28 DIAGNOSIS — F31.9 BIPOLAR DISEASE, CHRONIC (HCC): ICD-10-CM

## 2022-02-28 DIAGNOSIS — F33.1 MDD (MAJOR DEPRESSIVE DISORDER), RECURRENT EPISODE, MODERATE (HCC): Chronic | ICD-10-CM

## 2022-02-28 DIAGNOSIS — F43.10 PTSD (POST-TRAUMATIC STRESS DISORDER): Chronic | ICD-10-CM

## 2022-02-28 PROCEDURE — 99999 PR OFFICE/OUTPT VISIT,PROCEDURE ONLY: CPT | Performed by: NURSE PRACTITIONER

## 2022-02-28 ASSESSMENT — ENCOUNTER SYMPTOMS
NAUSEA: 0
WHEEZING: 0
ABDOMINAL PAIN: 0
SORE THROAT: 0
CHOKING: 0
COUGH: 0

## 2022-02-28 NOTE — PROGRESS NOTES
Lajuanda Opitz (:  1980) is a Established patient, here for evaluation of the following:    Assessment & Plan   Below is the assessment and plan developed based on review of pertinent history, physical exam, labs, studies, and medications. 1. Other cerebral palsy ([address]  Note written to excuse from work due to mental health concerns. Continue current medications  Continue care with Psych for counseling and medication mgmt. 2. PTSD (post-traumatic stress disorder)  3. MDD (major depressive disorder), recurrent episode, moderate ([address]  4. Bipolar disease, chronic ([address]    Return for keep August.       Subjective   HPI   Presenting today needing letter for excusing her from working for [employer]. Niurka continues to have severe depression and PTSD. Continues to have daily depression symptoms. Currently controlled with Lexapro, Abilify, Cogentin, Buspirone, Lamotrigine and Prazosin. Continues to follow with Psych for counseling and medication mgmt. No SI/ HI    [facility] Scores 2022 6/15/2018 2017   PHQ2 Score 2 3 5   PHQ9 Score 2 6 5     Interpretation of Total Score Depression Severity: 1-4 = Minimal depression, 5-9 = Mild depression, 10-14 = Moderate depression, 15-19 = Moderately severe depression, 20-27 = Severe depression    No flowsheet data found. Interpretation of ROSA-7 score: 5-9 = mild anxiety, 10-14 = moderate anxiety, 15+ = severe anxiety. Recommend referral to behavioral health for scores 10 or greater. Review of Systems   Constitutional: Negative for fatigue. HENT: Negative for sore throat. Respiratory: Negative for cough, choking and wheezing. Gastrointestinal: Negative for abdominal pain and nausea. Psychiatric/Behavioral: Positive for dysphoric mood. Negative for decreased concentration, sleep disturbance and suicidal ideas. The patient is nervous/anxious.            Objective   Patient-Reported Vitals  No data recorded     Physical Exam  [INSTRUCTIONS:  \"[x]\" Indicates a positive item  \"[]\" Indicates a negative item  -- DELETE ALL ITEMS NOT EXAMINED]    Constitutional: [x] Appears well-developed and well-nourished [x] No apparent distress      [] Abnormal -     Mental status: [x] Alert and awake  [x] Oriented to person/place/time [x] Able to follow commands    [] Abnormal -     Eyes:   EOM    [x]  Normal    [] Abnormal -   Sclera  [x]  Normal    [] Abnormal -          Discharge [x]  None visible   [] Abnormal -     HENT: [x] Normocephalic, atraumatic  [] Abnormal -   [x] Mouth/Throat: Mucous membranes are moist    External Ears [x] Normal  [] Abnormal -    Neck: [x] No visualized mass [] Abnormal -     Pulmonary/Chest: [x] Respiratory effort normal   [x] No visualized signs of difficulty breathing or respiratory distress        [] Abnormal -      Musculoskeletal:   [x] Normal gait with no signs of ataxia         [x] Normal range of motion of neck        [] Abnormal -     Neurological:        [x] No Facial Asymmetry (Cranial nerve 7 motor function) (limited exam due to video visit)          [x] No gaze palsy        [] Abnormal -          Skin:        [x] No significant exanthematous lesions or discoloration noted on facial skin         [] Abnormal -            Psychiatric:       [x] Normal Affect [] Abnormal -        [x] No Hallucinations    Other pertinent observable physical exam findings:-             On this date 2/28/2022 I have spent 10 minutes reviewing previous notes, test results and face to face (virtual) with the patient discussing the diagnosis and importance of compliance with the treatment plan as well as documenting on the day of the visit. Livan Wallace, was evaluated through a synchronous (real-time) audio-video encounter. The patient (or guardian if applicable) is aware that this is a billable service, which includes applicable co-pays. This Virtual Visit was conducted with patient's (and/or legal guardian's) consent.  The visit was conducted pursuant to the emergency declaration under the [facility], [address] [place] [address] [facility] authority and the [employer] and [employer] Act. Patient identification was verified, and a caregiver was present when appropriate. The patient was located at home in a state where the provider was licensed to provide care.        --HENRY Albrecht - CNP

## 2022-07-15 ENCOUNTER — TELEMEDICINE (OUTPATIENT)
Dept: FAMILY MEDICINE CLINIC | Age: 42
End: 2022-07-15
Payer: COMMERCIAL

## 2022-07-15 DIAGNOSIS — R05.9 COUGH: ICD-10-CM

## 2022-07-15 DIAGNOSIS — U07.1 COVID-19: Primary | ICD-10-CM

## 2022-07-15 PROCEDURE — 1036F TOBACCO NON-USER: CPT

## 2022-07-15 PROCEDURE — G8427 DOCREV CUR MEDS BY ELIG CLIN: HCPCS

## 2022-07-15 PROCEDURE — G8417 CALC BMI ABV UP PARAM F/U: HCPCS

## 2022-07-15 PROCEDURE — 99213 OFFICE O/P EST LOW 20 MIN: CPT

## 2022-07-15 RX ORDER — DEXTROMETHORPHAN HYDROBROMIDE AND PROMETHAZINE HYDROCHLORIDE 15; 6.25 MG/5ML; MG/5ML
5 SYRUP ORAL 4 TIMES DAILY PRN
Qty: 180 ML | Refills: 0 | Status: SHIPPED | OUTPATIENT
Start: 2022-07-15 | End: 2022-07-22

## 2022-07-15 ASSESSMENT — ENCOUNTER SYMPTOMS
SORE THROAT: 1
SHORTNESS OF BREATH: 0
COUGH: 1

## 2022-07-15 NOTE — PROGRESS NOTES
Ricky Robert (:  1980) is a Established patient, here for evaluation of the following:    Assessment & Plan   Below is the assessment and plan developed based on review of pertinent history, physical exam, labs, studies, and medications. 1. COVID-19  -Paxlovid ordered due to morbid obesity and cerebral palsy  -She was encouraged to increase her fluid intake  -Okay to use Tylenol over-the-counter for fever  -     nirmatrelvir/ritonavir (PAXLOVID) 20 x 150 MG & 10 x 100MG; Take 3 tablets (two 150 mg nirmatrelvir and one 100 mg ritonavir tablets) by mouth every 12 hours for 5 days. , Disp-30 tablet, R-0Normal  2. Cough  -Phenergan DM every 6 hours as needed  -     promethazine-dextromethorphan (PROMETHAZINE-DM) 6.25-15 MG/5ML syrup; Take 5 mLs by mouth 4 times daily as needed for Cough, Disp-180 mL, R-0Normal  Return if symptoms worsen or fail to improve. Subjective   Patient seen virtually today for chief complaint of cough, sore throat and nasal congestion that began on . She took a COVID test yesterday which was positive. She does report a fever as high as 100 even. She does report chills. Denies any hemoptysis, shortness of breath or chest pain. She does have a history of cerebral palsy and is morbidly obese. Cough  This is a new problem. The current episode started in the past 7 days. The problem has been gradually worsening. The problem occurs every few minutes. The cough is Non-productive. Associated symptoms include chills, a fever, nasal congestion and a sore throat. Pertinent negatives include no chest pain, ear congestion or shortness of breath. The symptoms are aggravated by lying down. She has tried nothing for the symptoms. The treatment provided no relief. There is no history of asthma or COPD. Pharyngitis  This is a new problem. The current episode started in the past 7 days. The problem occurs constantly. The problem has been unchanged.  Associated symptoms

## 2022-07-20 ENCOUNTER — TELEPHONE (OUTPATIENT)
Dept: FAMILY MEDICINE CLINIC | Age: 42
End: 2022-07-20

## 2022-07-20 NOTE — TELEPHONE ENCOUNTER
Pt tested positive on July 14 had vv on the 15   Pt took another test last night and still tested positive   Pt has been fever free for more than five days just wanted to make sure it was ok to come out of quarantine   Pt kids are also going to camp next week and wanted to make sure it was ok

## 2022-08-05 ENCOUNTER — OFFICE VISIT (OUTPATIENT)
Dept: FAMILY MEDICINE CLINIC | Age: 42
End: 2022-08-05
Payer: COMMERCIAL

## 2022-08-05 VITALS
SYSTOLIC BLOOD PRESSURE: 118 MMHG | TEMPERATURE: 97.3 F | HEART RATE: 84 BPM | DIASTOLIC BLOOD PRESSURE: 86 MMHG | OXYGEN SATURATION: 96 % | WEIGHT: 280 LBS | BODY MASS INDEX: 46.59 KG/M2

## 2022-08-05 DIAGNOSIS — R19.8 GAGGING EPISODE: ICD-10-CM

## 2022-08-05 DIAGNOSIS — K21.00 GASTROESOPHAGEAL REFLUX DISEASE WITH ESOPHAGITIS WITHOUT HEMORRHAGE: ICD-10-CM

## 2022-08-05 DIAGNOSIS — R11.2 NON-INTRACTABLE VOMITING WITH NAUSEA, UNSPECIFIED VOMITING TYPE: Primary | ICD-10-CM

## 2022-08-05 PROCEDURE — G8427 DOCREV CUR MEDS BY ELIG CLIN: HCPCS | Performed by: NURSE PRACTITIONER

## 2022-08-05 PROCEDURE — G8417 CALC BMI ABV UP PARAM F/U: HCPCS | Performed by: NURSE PRACTITIONER

## 2022-08-05 PROCEDURE — 1036F TOBACCO NON-USER: CPT | Performed by: NURSE PRACTITIONER

## 2022-08-05 PROCEDURE — 99214 OFFICE O/P EST MOD 30 MIN: CPT | Performed by: NURSE PRACTITIONER

## 2022-08-05 RX ORDER — PANTOPRAZOLE SODIUM 40 MG/1
40 TABLET, DELAYED RELEASE ORAL
Qty: 30 TABLET | Refills: 2 | Status: SHIPPED | OUTPATIENT
Start: 2022-08-05 | End: 2022-10-24

## 2022-08-05 RX ORDER — ONDANSETRON 4 MG/1
4 TABLET, FILM COATED ORAL 3 TIMES DAILY PRN
Qty: 30 TABLET | Refills: 0 | Status: SHIPPED | OUTPATIENT
Start: 2022-08-05

## 2022-08-05 ASSESSMENT — PATIENT HEALTH QUESTIONNAIRE - PHQ9
4. FEELING TIRED OR HAVING LITTLE ENERGY: 0
10. IF YOU CHECKED OFF ANY PROBLEMS, HOW DIFFICULT HAVE THESE PROBLEMS MADE IT FOR YOU TO DO YOUR WORK, TAKE CARE OF THINGS AT HOME, OR GET ALONG WITH OTHER PEOPLE: 0
6. FEELING BAD ABOUT YOURSELF - OR THAT YOU ARE A FAILURE OR HAVE LET YOURSELF OR YOUR FAMILY DOWN: 0
SUM OF ALL RESPONSES TO PHQ QUESTIONS 1-9: 0
2. FEELING DOWN, DEPRESSED OR HOPELESS: 0
3. TROUBLE FALLING OR STAYING ASLEEP: 0
7. TROUBLE CONCENTRATING ON THINGS, SUCH AS READING THE NEWSPAPER OR WATCHING TELEVISION: 0
5. POOR APPETITE OR OVEREATING: 0
9. THOUGHTS THAT YOU WOULD BE BETTER OFF DEAD, OR OF HURTING YOURSELF: 0
SUM OF ALL RESPONSES TO PHQ9 QUESTIONS 1 & 2: 0
1. LITTLE INTEREST OR PLEASURE IN DOING THINGS: 0
8. MOVING OR SPEAKING SO SLOWLY THAT OTHER PEOPLE COULD HAVE NOTICED. OR THE OPPOSITE, BEING SO FIGETY OR RESTLESS THAT YOU HAVE BEEN MOVING AROUND A LOT MORE THAN USUAL: 0
SUM OF ALL RESPONSES TO PHQ QUESTIONS 1-9: 0

## 2022-08-05 ASSESSMENT — ENCOUNTER SYMPTOMS
BLOOD IN STOOL: 0
SHORTNESS OF BREATH: 0
CHEST TIGHTNESS: 0
CONSTIPATION: 0
COUGH: 0
DIARRHEA: 0

## 2022-08-05 NOTE — PROGRESS NOTES
8/5/2022    Chief Complaint   Patient presents with    Hypertension    Gastroesophageal Reflux    Manic Behavior    Nausea & Vomiting     Her therapist is not sure if it is from her anxiety because that is when it happens. And this is happening at least once a day        Sandra Jeffers is a 43 y.o. female, presents today for:      ASSESSMENT/PLAN:    1. Non-intractable vomiting with nausea, unspecified vomiting type  Likely GERD related but cannot r/o esophageal stricture. Encouraged intake of small, frequent meals  Mild improvement with Pepcid after last OV, trial Pantoprazole  Referral to GI for possible EGD  - Amb External Referral To Gastroenterology  - ondansetron (ZOFRAN) 4 MG tablet; Take 1 tablet by mouth 3 times daily as needed for Nausea or Vomiting  Dispense: 30 tablet; Refill: 0  - pantoprazole (PROTONIX) 40 MG tablet; Take 1 tablet by mouth every morning (before breakfast)  Dispense: 30 tablet; Refill: 2    2. Gastroesophageal reflux disease with esophagitis without hemorrhage  See above  - pantoprazole (PROTONIX) 40 MG tablet; Take 1 tablet by mouth every morning (before breakfast)  Dispense: 30 tablet; Refill: 2    3. Gagging episode  See above    Return in about 6 months (around 2/5/2023). Presenting today for concern of worsening heartburn. Heartburn Initial HPI 2/8/22   She complains of heartburn. She reports no abdominal pain, no choking, no coughing, no dysphagia, no early satiety, no globus sensation, no hoarse voice, no nausea, no sore throat, no stridor, no tooth decay, no water brash or no wheezing. This is a new problem. The current episode started more than 1 month ago. The problem occurs constantly. The problem has been gradually worsening. The heartburn duration is several minutes (right after she lays down, and when she wakes up). Heartburn location: throat. The heartburn is of moderate intensity. The heartburn wakes her from sleep.  The heartburn does not limit her activity. The heartburn doesn't change with position. Nothing aggravates the symptoms. Pertinent negatives include no anemia, fatigue, melena, muscle weakness, orthopnea or weight loss. Risk factors include lack of exercise and obesity. Treatments tried: TUMS. Update 8/5/22: Worsening gagging and nausea over the past 4 months. Will eat dinner and will then vomit within 30 minutes of meal.  Emesis is usually whatever is in her stomach at that time. Robbie Davenport is normally her biggest meal of the day. Has attemped to eat smaller meals or snacks more often without improvement. Of note, pt hx significant for bipolar and anxiety. Counselor concerned it may be related to that. No appetitie or unintentional weight loss. Previously dx with GERD but this feels different than her GERD. Pepcid mildly helped symptoms. Lab Results   Component Value Date     01/27/2021    K 4.4 01/27/2021     01/27/2021    CO2 22 01/27/2021    BUN 14 01/27/2021    CREATININE 0.9 01/27/2021    GLUCOSE 88 01/27/2021    CALCIUM 9.7 01/27/2021    PROT 7.3 01/27/2021    LABALBU 4.2 01/27/2021    BILITOT <0.2 01/27/2021    ALKPHOS 81 01/27/2021    AST 11 (L) 01/27/2021    ALT 9 (L) 01/27/2021    LABGLOM >60 01/27/2021    GFRAA >60 01/27/2021    AGRATIO 1.4 01/27/2021    GLOB 3.1 01/27/2021     Review of Systems   Constitutional: Negative. Respiratory:  Negative for cough, chest tightness and shortness of breath. Cardiovascular: Negative. Gastrointestinal:  Negative for blood in stool, constipation and diarrhea. Genitourinary: Negative. Skin: Negative. Neurological:  Negative for dizziness, tremors, light-headedness and headaches. Psychiatric/Behavioral:  Negative for decreased concentration, dysphoric mood, self-injury, sleep disturbance and suicidal ideas. The patient is not nervous/anxious.       Current Outpatient Medications on File Prior to Visit   Medication Sig Dispense Refill    benztropine (COGENTIN) 1 MG tablet Take 1 mg by mouth daily      metoprolol succinate (TOPROL XL) 50 MG extended release tablet Take 1 tablet by mouth daily 30 tablet 11    famotidine (PEPCID) 20 MG tablet Take 1 tablet by mouth nightly as needed (heartburn) 30 tablet 3    ARIPiprazole (ABILIFY) 10 MG tablet Take 10 mg by mouth daily      busPIRone (BUSPAR) 15 MG tablet Take 15 mg by mouth 2 in am; 1 qhs      lamoTRIgine (LAMICTAL) 200 MG tablet Take 200 mg by mouth nightly       prazosin (MINIPRESS) 2 MG capsule Take 2 mg by mouth nightly       escitalopram (LEXAPRO) 20 MG tablet TAKE ONE TABLET BY MOUTH DAILY (Patient taking differently: Take 20 mg by mouth in the morning. TAKE ONE TABLET BY MOUTH DAILY. ) 30 tablet 3     No current facility-administered medications on file prior to visit. Allergies   Allergen Reactions    Sulfa Antibiotics     Amoxicillin Rash    Ultram [Tramadol] Nausea And Vomiting    Vicodin [Hydrocodone-Acetaminophen] Nausea And Vomiting     Past Medical History:   Diagnosis Date    Bipolar disorder ([address]     Cerebral palsy ([address]     legs;mainly right    Depression     Hypertension      Past Surgical History:   Procedure Laterality Date    FOOT SURGERY      KNEE SURGERY Left 6/12/15    LEG SURGERY       Social History     Tobacco Use    Smoking status: Never    Smokeless tobacco: Never   Substance Use Topics    Alcohol use: No     Alcohol/week: 0.0 standard drinks     History reviewed. No pertinent family history. Vitals:    08/05/22 1517   BP: 118/86   Pulse: 84   Temp: 97.3 °F (36.3 °C)   TempSrc: Infrared   SpO2: 96%   Weight: 280 lb (127 kg)     Estimated body mass index is 46.59 kg/m² as calculated from the following:    Height as of 7/13/21: 5' 5\" (1.651 m). Weight as of this encounter: 280 lb (127 kg). Physical Exam  Vitals reviewed. Constitutional:       Appearance: She is obese. HENT:      Head: Normocephalic. Neck:      Vascular: No carotid bruit.    Cardiovascular:      Rate and Rhythm: Normal rate and regular rhythm. Pulses: Normal pulses. Carotid pulses are 2+ on the right side and 2+ on the left side. Dorsalis pedis pulses are 2+ on the right side and 2+ on the left side. Posterior tibial pulses are 2+ on the right side and 2+ on the left side. Heart sounds: Normal heart sounds. No murmur heard. No gallop. Pulmonary:      Effort: Pulmonary effort is normal.      Breath sounds: Normal breath sounds. Abdominal:      General: Abdomen is flat. Palpations: Abdomen is soft. Musculoskeletal:         General: Normal range of motion. Cervical back: Normal range of motion. Right lower leg: No edema. Left lower leg: No edema. Lymphadenopathy:      Cervical: No cervical adenopathy. Skin:     General: Skin is warm and dry. Capillary Refill: Capillary refill takes less than 2 seconds. Neurological:      General: No focal deficit present. Mental Status: She is alert and oriented to person, place, and time. Psychiatric:         Mood and Affect: Mood normal.         Behavior: Behavior normal.         Patient's questions answered and concerns addressed. Patient agrees to plan of care.         Electronically signed by HENRY Alva CNP on 8/5/2022 at 5:46 PM

## 2022-09-29 ENCOUNTER — OFFICE VISIT (OUTPATIENT)
Dept: FAMILY MEDICINE CLINIC | Age: 42
End: 2022-09-29
Payer: COMMERCIAL

## 2022-09-29 VITALS
HEART RATE: 92 BPM | BODY MASS INDEX: 47.76 KG/M2 | OXYGEN SATURATION: 97 % | WEIGHT: 287 LBS | TEMPERATURE: 97.6 F | SYSTOLIC BLOOD PRESSURE: 118 MMHG | DIASTOLIC BLOOD PRESSURE: 80 MMHG

## 2022-09-29 DIAGNOSIS — G80.8 OTHER CEREBRAL PALSY (HCC): ICD-10-CM

## 2022-09-29 DIAGNOSIS — E55.9 VITAMIN D DEFICIENCY: ICD-10-CM

## 2022-09-29 DIAGNOSIS — Z23 NEEDS FLU SHOT: ICD-10-CM

## 2022-09-29 DIAGNOSIS — I10 PRIMARY HYPERTENSION: ICD-10-CM

## 2022-09-29 DIAGNOSIS — F43.10 PTSD (POST-TRAUMATIC STRESS DISORDER): ICD-10-CM

## 2022-09-29 DIAGNOSIS — R22.43 LOCALIZED SWELLING OF BOTH LOWER LEGS: Primary | ICD-10-CM

## 2022-09-29 DIAGNOSIS — F31.9 BIPOLAR DISEASE, CHRONIC (HCC): ICD-10-CM

## 2022-09-29 PROCEDURE — 36415 COLL VENOUS BLD VENIPUNCTURE: CPT | Performed by: NURSE PRACTITIONER

## 2022-09-29 PROCEDURE — 99213 OFFICE O/P EST LOW 20 MIN: CPT | Performed by: NURSE PRACTITIONER

## 2022-09-29 PROCEDURE — G8417 CALC BMI ABV UP PARAM F/U: HCPCS | Performed by: NURSE PRACTITIONER

## 2022-09-29 PROCEDURE — G8427 DOCREV CUR MEDS BY ELIG CLIN: HCPCS | Performed by: NURSE PRACTITIONER

## 2022-09-29 PROCEDURE — 90471 IMMUNIZATION ADMIN: CPT | Performed by: NURSE PRACTITIONER

## 2022-09-29 PROCEDURE — 90674 CCIIV4 VAC NO PRSV 0.5 ML IM: CPT | Performed by: NURSE PRACTITIONER

## 2022-09-29 PROCEDURE — 1036F TOBACCO NON-USER: CPT | Performed by: NURSE PRACTITIONER

## 2022-09-29 ASSESSMENT — ENCOUNTER SYMPTOMS
BLOOD IN STOOL: 0
DIARRHEA: 0
CHEST TIGHTNESS: 0
GASTROINTESTINAL NEGATIVE: 1
COUGH: 0
CONSTIPATION: 0
SHORTNESS OF BREATH: 0

## 2022-09-29 ASSESSMENT — PATIENT HEALTH QUESTIONNAIRE - PHQ9
SUM OF ALL RESPONSES TO PHQ QUESTIONS 1-9: 0
10. IF YOU CHECKED OFF ANY PROBLEMS, HOW DIFFICULT HAVE THESE PROBLEMS MADE IT FOR YOU TO DO YOUR WORK, TAKE CARE OF THINGS AT HOME, OR GET ALONG WITH OTHER PEOPLE: 0
3. TROUBLE FALLING OR STAYING ASLEEP: 0
7. TROUBLE CONCENTRATING ON THINGS, SUCH AS READING THE NEWSPAPER OR WATCHING TELEVISION: 0
2. FEELING DOWN, DEPRESSED OR HOPELESS: 0
5. POOR APPETITE OR OVEREATING: 0
SUM OF ALL RESPONSES TO PHQ9 QUESTIONS 1 & 2: 0
6. FEELING BAD ABOUT YOURSELF - OR THAT YOU ARE A FAILURE OR HAVE LET YOURSELF OR YOUR FAMILY DOWN: 0
9. THOUGHTS THAT YOU WOULD BE BETTER OFF DEAD, OR OF HURTING YOURSELF: 0
8. MOVING OR SPEAKING SO SLOWLY THAT OTHER PEOPLE COULD HAVE NOTICED. OR THE OPPOSITE, BEING SO FIGETY OR RESTLESS THAT YOU HAVE BEEN MOVING AROUND A LOT MORE THAN USUAL: 0
1. LITTLE INTEREST OR PLEASURE IN DOING THINGS: 0
SUM OF ALL RESPONSES TO PHQ QUESTIONS 1-9: 0
4. FEELING TIRED OR HAVING LITTLE ENERGY: 0

## 2022-09-29 NOTE — PROGRESS NOTES
9/29/2022    Chief Complaint   Patient presents with    Leg Swelling     Bilateral leg & feet swelling, started about week ago and worst at the end of the day       Amy Ruvalcaba is a 43 y.o. female, presents today for:      ASSESSMENT/PLAN:    1. Localized swelling of both lower legs  Likely diet/ gravity related. Discussed decreased salt intake and compression sock use  Discussed DASH diet  Call if no improvement  - TSH with Reflex  - Comprehensive Metabolic Panel  - Hemoglobin A1C  - LIPID PANEL  - CBC with Auto Differential    2. Primary hypertension  Stable today  Continue current medication    3. Other cerebral palsy ([facility] [address]  Stable today    4. PTSD (post-traumatic stress disorder)  Stable today  Continue current medication  Continue care with Psych    5. Bipolar disease, chronic ([facility] [address]  Stable today  Continue care with Psych    6. Vitamin D deficiency  Labs today  - Vitamin D 25 Hydroxy    7. Needs flu shot  Influenza vaccination given today  - Influenza, FLUCELVAX, (age 10 mo+), IM, Preservative Free, 0.5 mL    Return if symptoms worsen or fail to improve. Presenting today for bilateral leg swelling for the past month below the knees. Has been standing on her feet for the majority of the day. Leg swelling will mostly improve but not completely by the AM.  Taking care of her family members and cleaning her home due to [address] inspections. Has been eating out more often with more salty foods. Did stop soda after last month with increase intake of water. No joint/ leg pain or new trauma. No compression sock use. No CP, SOB. Hx cerebral palsy, arthritis of knees. Needing annual labs today. Depression continues to be well controlled. Continues to see Psych for medications.        Ate at noon today     [facility] Scores 9/29/2022 8/5/2022 1/18/2022 6/15/2018 5/18/2017   PHQ2 Score 0 0 2 3 5   PHQ9 Score 0 0 2 6 5     Interpretation of Total Score Depression Severity: 1-4 = Minimal depression, 5-9 = General: Abdomen is flat. Palpations: Abdomen is soft. Musculoskeletal:         General: Normal range of motion. Cervical back: Normal range of motion. Right lower leg: No edema. Left lower leg: No edema. Lymphadenopathy:      Cervical: No cervical adenopathy. Skin:     General: Skin is warm and dry. Capillary Refill: Capillary refill takes less than 2 seconds. Neurological:      General: No focal deficit present. Mental Status: She is alert and oriented to person, place, and time. Psychiatric:         Mood and Affect: Mood normal.         Behavior: Behavior normal.         Patient's questions answered and concerns addressed. Patient agrees to plan of care.         Electronically signed by HENRY Munson CNP on 9/29/2022 at 6:19 PM

## 2022-09-30 LAB
A/G RATIO: 1.5 (ref 1.1–2.2)
ALBUMIN SERPL-MCNC: 4.3 G/DL (ref 3.4–5)
ALP BLD-CCNC: 72 U/L (ref 40–129)
ALT SERPL-CCNC: 18 U/L (ref 10–40)
ANION GAP SERPL CALCULATED.3IONS-SCNC: 13 MMOL/L (ref 3–16)
AST SERPL-CCNC: 17 U/L (ref 15–37)
BASOPHILS ABSOLUTE: 0.1 K/UL (ref 0–0.2)
BASOPHILS RELATIVE PERCENT: 0.9 %
BILIRUB SERPL-MCNC: <0.2 MG/DL (ref 0–1)
BUN BLDV-MCNC: 12 MG/DL (ref 7–20)
CALCIUM SERPL-MCNC: 9.8 MG/DL (ref 8.3–10.6)
CHLORIDE BLD-SCNC: 100 MMOL/L (ref 99–110)
CHOLESTEROL, TOTAL: 216 MG/DL (ref 0–199)
CO2: 26 MMOL/L (ref 21–32)
CREAT SERPL-MCNC: 1.1 MG/DL (ref 0.6–1.1)
EOSINOPHILS ABSOLUTE: 0.1 K/UL (ref 0–0.6)
EOSINOPHILS RELATIVE PERCENT: 2 %
ESTIMATED AVERAGE GLUCOSE: 99.7 MG/DL
GFR AFRICAN AMERICAN: >60
GFR NON-AFRICAN AMERICAN: 54
GLUCOSE BLD-MCNC: 112 MG/DL (ref 70–99)
HBA1C MFR BLD: 5.1 %
HCT VFR BLD CALC: 41 % (ref 36–48)
HDLC SERPL-MCNC: 50 MG/DL (ref 40–60)
HEMOGLOBIN: 13.5 G/DL (ref 12–16)
LDL CHOLESTEROL CALCULATED: 129 MG/DL
LYMPHOCYTES ABSOLUTE: 1.5 K/UL (ref 1–5.1)
LYMPHOCYTES RELATIVE PERCENT: 27.1 %
MCH RBC QN AUTO: 27.3 PG (ref 26–34)
MCHC RBC AUTO-ENTMCNC: 33.1 G/DL (ref 31–36)
MCV RBC AUTO: 82.6 FL (ref 80–100)
MONOCYTES ABSOLUTE: 0.4 K/UL (ref 0–1.3)
MONOCYTES RELATIVE PERCENT: 7.9 %
NEUTROPHILS ABSOLUTE: 3.5 K/UL (ref 1.7–7.7)
NEUTROPHILS RELATIVE PERCENT: 62.1 %
PDW BLD-RTO: 14.4 % (ref 12.4–15.4)
PLATELET # BLD: 302 K/UL (ref 135–450)
PMV BLD AUTO: 7 FL (ref 5–10.5)
POTASSIUM SERPL-SCNC: 4.1 MMOL/L (ref 3.5–5.1)
RBC # BLD: 4.96 M/UL (ref 4–5.2)
SODIUM BLD-SCNC: 139 MMOL/L (ref 136–145)
TOTAL PROTEIN: 7.2 G/DL (ref 6.4–8.2)
TRIGL SERPL-MCNC: 185 MG/DL (ref 0–150)
TSH REFLEX: 2.3 UIU/ML (ref 0.27–4.2)
VITAMIN D 25-HYDROXY: 17.7 NG/ML
VLDLC SERPL CALC-MCNC: 37 MG/DL
WBC # BLD: 5.6 K/UL (ref 4–11)

## 2022-09-30 NOTE — RESULT ENCOUNTER NOTE
Vitamin D level continues to be low. Are you taking a supplement? No diabetes. Normal thyroid function. Normal kidney/ liver function. Cholesterol similar to prior, continue to work on exercise and working on diet changes. NO anemia. No cause for the leg swelling. Work on [employer] changes like we discussed yesterday and see if that helps.

## 2022-10-17 ENCOUNTER — TELEPHONE (OUTPATIENT)
Dept: FAMILY MEDICINE CLINIC | Age: 42
End: 2022-10-17

## 2022-10-17 NOTE — TELEPHONE ENCOUNTER
Patient is asking if Michael Rosas DNP would refer her to an Orthopaedic to be seen for swelling and pain in her left knee.

## 2022-10-21 ENCOUNTER — HOSPITAL ENCOUNTER (OUTPATIENT)
Dept: GENERAL RADIOLOGY | Age: 42
Discharge: HOME | End: 2022-10-21
Payer: COMMERCIAL

## 2022-10-21 DIAGNOSIS — R11.2 NAUSEA AND VOMITING: ICD-10-CM

## 2022-10-21 DIAGNOSIS — R13.10 DYSPHAGIA, UNSPECIFIED TYPE: ICD-10-CM

## 2022-10-21 DIAGNOSIS — K21.00 GASTROESOPHAGEAL REFLUX DISEASE WITH ESOPHAGITIS, UNSPECIFIED WHETHER HEMORRHAGE: ICD-10-CM

## 2022-10-21 PROCEDURE — 74220 X-RAY XM ESOPHAGUS 1CNTRST: CPT

## 2022-10-24 DIAGNOSIS — R11.2 NON-INTRACTABLE VOMITING WITH NAUSEA: ICD-10-CM

## 2022-10-24 DIAGNOSIS — K21.00 GASTROESOPHAGEAL REFLUX DISEASE WITH ESOPHAGITIS WITHOUT HEMORRHAGE: ICD-10-CM

## 2022-10-24 RX ORDER — PANTOPRAZOLE SODIUM 40 MG/1
40 TABLET, DELAYED RELEASE ORAL
Qty: 30 TABLET | Refills: 2 | Status: SHIPPED | OUTPATIENT
Start: 2022-10-24

## 2022-10-24 NOTE — TELEPHONE ENCOUNTER
Future appt scheduled 0 appt scheduled                  Last appt 09/29/2022      Last Written 08/05/2022    pantoprazole (PROTONIX) 40 MG tablet  #30  2 RF

## 2022-10-26 ENCOUNTER — OFFICE VISIT (OUTPATIENT)
Dept: ORTHOPEDIC SURGERY | Age: 42
End: 2022-10-26
Payer: COMMERCIAL

## 2022-10-26 VITALS — HEIGHT: 65 IN | WEIGHT: 287 LBS | BODY MASS INDEX: 47.82 KG/M2

## 2022-10-26 DIAGNOSIS — M17.12 PRIMARY OSTEOARTHRITIS OF LEFT KNEE: Primary | ICD-10-CM

## 2022-10-26 PROCEDURE — 99212 OFFICE O/P EST SF 10 MIN: CPT | Performed by: ORTHOPAEDIC SURGERY

## 2022-10-26 PROCEDURE — 1036F TOBACCO NON-USER: CPT | Performed by: ORTHOPAEDIC SURGERY

## 2022-10-26 PROCEDURE — G8482 FLU IMMUNIZE ORDER/ADMIN: HCPCS | Performed by: ORTHOPAEDIC SURGERY

## 2022-10-26 PROCEDURE — G8417 CALC BMI ABV UP PARAM F/U: HCPCS | Performed by: ORTHOPAEDIC SURGERY

## 2022-10-26 PROCEDURE — G8427 DOCREV CUR MEDS BY ELIG CLIN: HCPCS | Performed by: ORTHOPAEDIC SURGERY

## 2022-10-26 NOTE — PROGRESS NOTES
She returns today for evaluation of her left knee. She was given Durolane last year in July 2021 and says she got more than 6 months of pain relief with those injections. Her last cortisone was before that. At this time on examination after greater pain 6/10 she has generalized pain some crepitus and pain patellofemoral joint region. At this time I did review x-rays that she had taken in June 2021 which shows some mild arthritis. Impression: Left knee osteoarthritis    Recommendation: At this time I do recommend requesting authorization for another round of Durolane with cortisone at her next visit. She appears to appreciate and understand that recommendation will proceed accordingly.

## 2022-10-31 ENCOUNTER — TELEPHONE (OUTPATIENT)
Dept: ORTHOPEDIC SURGERY | Age: 42
End: 2022-10-31

## 2022-10-31 NOTE — TELEPHONE ENCOUNTER
SPOKE WITH PATIENT TO [address] [place] [address] INJECTION HAS BEEN AUTHORIZED.  PATIENT WILL CALL BACK TO MAKE APPOINTMENT

## 2022-11-30 ENCOUNTER — OFFICE VISIT (OUTPATIENT)
Dept: ORTHOPEDIC SURGERY | Age: 42
End: 2022-11-30

## 2022-11-30 VITALS — BODY MASS INDEX: 47.82 KG/M2 | HEIGHT: 65 IN | WEIGHT: 287 LBS

## 2022-11-30 DIAGNOSIS — M17.12 PRIMARY OSTEOARTHRITIS OF LEFT KNEE: Primary | ICD-10-CM

## 2022-11-30 RX ORDER — BUPIVACAINE HYDROCHLORIDE 2.5 MG/ML
7 INJECTION, SOLUTION INFILTRATION; PERINEURAL ONCE
Status: COMPLETED | OUTPATIENT
Start: 2022-11-30 | End: 2022-11-30

## 2022-11-30 RX ORDER — TRIAMCINOLONE ACETONIDE 40 MG/ML
40 INJECTION, SUSPENSION INTRA-ARTICULAR; INTRAMUSCULAR ONCE
Status: COMPLETED | OUTPATIENT
Start: 2022-11-30 | End: 2022-11-30

## 2022-11-30 RX ADMIN — BUPIVACAINE HYDROCHLORIDE 17.5 MG: 2.5 INJECTION, SOLUTION INFILTRATION; PERINEURAL at 12:05

## 2022-11-30 RX ADMIN — TRIAMCINOLONE ACETONIDE 40 MG: 40 INJECTION, SUSPENSION INTRA-ARTICULAR; INTRAMUSCULAR at 12:05

## 2022-11-30 NOTE — PROGRESS NOTES
Recommendation is for viscosupplementation using Durolane. The left knee was injected with 3 ml of Durolane from an anterolateral joint line approach using a 22-gauge needle under sterile Betadine prep, using ethyl chloride as a topical refrigerant, for a diagnosis of osteoarthritis. The patient appeared to tolerate it well. The patient should return here in 2 months. Recommendation is for a cortisone injection into the left knee. After informed consent was received from the patient, the left knee was injected with1 mL of 40mg/ml of Kenalog and 4 mL  of 0.25% Marcaine in the syringe from an anterolateral joint line approach, using a 25-gauge needle, under sterile Betadine prep, using ethyl chloride as a topical refrigerant, for a diagnosis of osteoarthritis. The patient appeared to tolerate it well. The patient should return here periodically as needed. Encounter Diagnosis   Name Primary?     Primary osteoarthritis of left knee Yes        Orders Placed This Encounter   Procedures    [id number] - [place] DRAIN/INJECT LARGE JOINT/BURSA

## 2022-12-28 ENCOUNTER — OFFICE VISIT (OUTPATIENT)
Dept: ORTHOPEDIC SURGERY | Age: 42
End: 2022-12-28

## 2022-12-28 VITALS — HEIGHT: 65 IN | WEIGHT: 287 LBS | BODY MASS INDEX: 47.82 KG/M2

## 2022-12-28 DIAGNOSIS — M25.562 LEFT KNEE PAIN, UNSPECIFIED CHRONICITY: Primary | ICD-10-CM

## 2022-12-28 RX ORDER — OXYCODONE HYDROCHLORIDE AND ACETAMINOPHEN 5; 325 MG/1; MG/1
1 TABLET ORAL EVERY 6 HOURS PRN
Qty: 28 TABLET | Refills: 0 | Status: SHIPPED | OUTPATIENT
Start: 2022-12-28 | End: 2023-01-04

## 2022-12-28 NOTE — PROGRESS NOTES
KNEE VISIT      HISTORY OF PRESENT ILLNESS    Jacqueline Rosas is a 43 y.o. female who presents for reevaluation of her left knee. She examined issues with it which sounds more arthritic having constant pain with clicking crunching and buckling. She denies numbness or tingling. Her pain is in the anterior left leg in the lateral.  She is fallen twice in the last week dropping onto her leg because it gave out. She had Durolane last month. She did not feel it helped much even before the fall. ROS    Well-documented patient history form dated 12/28/2022  All other ROS negative except for above.     Past Surgical history    Past Surgical History:   Procedure Laterality Date    FOOT SURGERY      KNEE SURGERY Left 6/12/15    LEG SURGERY         PAST MEDICAL    Past Medical History:   Diagnosis Date    Bipolar disorder ([address]     Cerebral palsy ([address]     legs;mainly right    Depression     Hypertension        Allergies    Allergies   Allergen Reactions    Sulfa Antibiotics     Amoxicillin Rash    Ultram [Tramadol] Nausea And Vomiting    Vicodin [Hydrocodone-Acetaminophen] Nausea And Vomiting       Meds    Current Outpatient Medications   Medication Sig Dispense Refill    pantoprazole (PROTONIX) 40 MG tablet Take 1 tablet by mouth every morning (before breakfast) 30 tablet 2    ondansetron (ZOFRAN) 4 MG tablet Take 1 tablet by mouth 3 times daily as needed for Nausea or Vomiting 30 tablet 0    benztropine (COGENTIN) 1 MG tablet Take 1 mg by mouth daily      metoprolol succinate (TOPROL XL) 50 MG extended release tablet Take 1 tablet by mouth daily 30 tablet 11    famotidine (PEPCID) 20 MG tablet Take 1 tablet by mouth nightly as needed (heartburn) 30 tablet 3    ARIPiprazole (ABILIFY) 10 MG tablet Take 10 mg by mouth daily      busPIRone (BUSPAR) 15 MG tablet Take 15 mg by mouth 2 in am; 1 qhs      lamoTRIgine (LAMICTAL) 200 MG tablet Take 200 mg by mouth nightly       prazosin (MINIPRESS) 2 MG capsule Take 2 mg by Her neurocirculatory lymphatic exam is unchanged. She does have cervical palsy and has some tetraplegia. IMAGING STUDIES    X-rays 3 views of her left knee reveals moderately severe osteoarthritis with severe patellofemoral degenerative change and complete loss of joint space    IMPRESSION    Left knee contusion which is substantially aggravated a pre-existing osteoarthritis    PLAN      1. Conservative care options including physical therapy, NSAIDs, bracing, and activity modification were discussed. 2.  The indications for therapeutic injections were discussed. 3.  The indications for additional imaging studies were discussed. 4.  After considering the various options discussed, the patient elected to pursue a course that includes an MRI of her left knee because of the acuteness of her pain this location and a knee support. After her MRI I told her I would recommend she see Dr. Kay Valerio because if she does not have a traumatic injury that she may benefit from knee replacement surgery.

## 2022-12-29 ENCOUNTER — TELEPHONE (OUTPATIENT)
Dept: ORTHOPEDIC SURGERY | Age: 42
End: 2022-12-29

## 2022-12-29 NOTE — TELEPHONE ENCOUNTER
MRI APPROVAL LEFT KNEE. INFORMED PATIENT MRI ORDER ALONG WITH MRI AUTHORIZATION WAS FAXED TO [facility]el[facility] Julien. INFORMED PATIENT TO CALL AT THEIR CONVENIENCE TO SCHEDULE THAT MRI.  ALSO, INFORMED PATIENT TO CALL OUR SCHEDULING DEPT TO SCHEDULE FOLLOW UP APPOINTMENT  WITH DR ACEVEDO TO GO OVER THOSE RESULTS

## 2023-01-10 ENCOUNTER — OFFICE VISIT (OUTPATIENT)
Dept: ORTHOPEDIC SURGERY | Age: 43
End: 2023-01-10

## 2023-01-10 VITALS — WEIGHT: 287 LBS | HEIGHT: 65 IN | BODY MASS INDEX: 47.82 KG/M2

## 2023-01-10 DIAGNOSIS — M17.12 OSTEOARTHRITIS OF LEFT KNEE, UNSPECIFIED OSTEOARTHRITIS TYPE: Primary | ICD-10-CM

## 2023-01-10 NOTE — LETTER
[facility]  Surgery Scheduling Form:    23     DEMOGRAPHICS    Patient Name:  Kait Cuenca  Patient :  1980   Patient SS#:  [id number]    Patient Phone:  [phone] (home)  Alt. Patient Phone:    Patient Address:  [address] [place] [address]    PCP:  HENRY Farrell CNP  Insurance:  Payor: Taiwo Perkins / Plan: [employer] / Product Type: *No Product type* /   Insurance ID Number:  Payer/Plan Subscr  Sex Relation Sub. Ins. ID Effective Group Num   1. Baudilio AQUINO 1980 Female Self [id number] [address] [facility] [address]       DIAGNOSIS & PROCEDURE    Diagnosis:   Left Knee Osteoarthritis    Operation: LEFT TKA    Provider:  Alesha Ferrera MD      Location: [facility] INFORMATION    Requested Date:  Per pt, PCP visit    Requested Time:  any        OR Time Required:  120 Minutes  Admission:  []Outpatient   []23 hour  [x]Same Day Admit:   1-2  days  []Inpatient    Anesthesia:  [x]General  []Spinal  []MAC/Sedation  Regional Anesthesia:  []None  []Lumbar Plexus Block  []Fascia Iliaca  []Femoral  [x]Adductor canal  []Interscalene Block  []Insert Catheter  []OrthoMix []Exparel   [x] iPAC       EQUIPMENT    Position:  [x]Supine  []Lateral  []Beach-chair  []Prone    OR Bed:  [x]Regular  [x]Avalos boot  []Rafy    Radiology:  []Large C-arm  []Small C-arm  []Portable X-ray    Implants:  [employer] Knee:  [x]Primary Set  []Revision Set      SUTURE: Standard TJA  DRESSING:  [x]Prineo dermabond  []4x4 gauze  [x]ABDs  []Tegaderm                         []Staples  []Pravena incisional vac  BRACE: []Pelvic Binder  []Hip X-ACT  []Knee TROM  []Knee immobilizer                 []Shoulder Immob. (w/abd. pillow)  []Sling  [x]Ice Unit  [x]Stockinette      [x][employer]:  Lucia Newberry [phone], [email or web address]  []Medacta: Genie Goodrich [phone], [email or web address]  []Fx Shoulder: Abner Meadows [phone], [email or web address]  [][place]open: Carolina Deal [phone], [email or web address]

## 2023-01-12 NOTE — PROGRESS NOTES
Dr Mary Ellis      Date /Time 1/10/2023       6:55 PM EST  Name Shalom Dubose             1980   Location  [address] [facility]  MRN [id number]                Chief Complaint   Patient presents with    Knee Pain     CK Lt knee        History of Present Illness  Shalom Dubose is a 43 y.o. female who presents with chronic left knee pain. Previously followed by Dr. Angy Gilliam for this. Previous treatment has consisted of activity modification, rest, Mobic, physical therapy, steroid and gel injections. All of these modalities have unfortunately failed to improve her knee pain. Her dysfunction associated with her knee has progressed to the point of having frequent falls and the knee giving out on her. She had an MRI which showed bone-on-bone changes to her patellofemoral joint and is referred for consideration of left total knee replacement. Sent in consultation by HENRY Rodriguez - CNP, .         Past History  Past Medical History:   Diagnosis Date    Bipolar disorder ([facility] [address]     Cerebral palsy ([facility] [address]     legs;mainly right    Depression     Hypertension      Past Surgical History:   Procedure Laterality Date    FOOT SURGERY      KNEE SURGERY Left 6/12/15    LEG SURGERY       Social History     Tobacco Use    Smoking status: Never    Smokeless tobacco: Never   Substance Use Topics    Alcohol use: No     Alcohol/week: 0.0 standard drinks      Current Outpatient Medications on File Prior to Visit   Medication Sig Dispense Refill    pantoprazole (PROTONIX) 40 MG tablet Take 1 tablet by mouth every morning (before breakfast) 30 tablet 2    ondansetron (ZOFRAN) 4 MG tablet Take 1 tablet by mouth 3 times daily as needed for Nausea or Vomiting 30 tablet 0    benztropine (COGENTIN) 1 MG tablet Take 1 mg by mouth daily      metoprolol succinate (TOPROL XL) 50 MG extended release tablet Take 1 tablet by mouth daily 30 tablet 11    famotidine (PEPCID) 20 MG tablet Take 1 tablet by mouth nightly as needed (heartburn) 30 tablet 3    ARIPiprazole (ABILIFY) 10 MG tablet Take 10 mg by mouth daily      busPIRone (BUSPAR) 15 MG tablet Take 15 mg by mouth 2 in am; 1 qhs      lamoTRIgine (LAMICTAL) 200 MG tablet Take 200 mg by mouth nightly       prazosin (MINIPRESS) 2 MG capsule Take 2 mg by mouth nightly       escitalopram (LEXAPRO) 20 MG tablet TAKE ONE TABLET BY MOUTH DAILY (Patient taking differently: Take 20 mg by mouth daily TAKE ONE TABLET BY MOUTH DAILY) 30 tablet 3     No current facility-administered medications on file prior to visit. ASCVD 10-YEAR RISK SCORE  The 10-year ASCVD risk score (Shanta MORATAYA, et al., 2019) is: 1%    Values used to calculate the score:      Age: 43 years      Sex: Female      Is Non- : No      Diabetic: No      Tobacco smoker: No      Systolic Blood Pressure: 097 mmHg      Is BP treated: Yes      HDL Cholesterol: 50 mg/dL      Total Cholesterol: 216 mg/dL     Review of Systems  10-point ROS is negative other than HPI. Physical Exam  Ht 5' 5\" (1.651 m)   Wt 287 lb (130.2 kg)   BMI 47.76 kg/m²      Constitutional:       General: He is not in acute distress. Appearance: Normal appearance. Cardiovascular:      Rate and Rhythm: Normal rate and regular rhythm. Pulses: Normal pulses. Pulmonary:      Effort: Pulmonary effort is normal. No respiratory distress. Neurological:      Mental Status: He is alert and oriented to person, place, and time. Mental status is at baseline. Skin: Mean, dry, intact.   No open sores  Lymphatics: No palpable lymph nodes    Musculoskeletal:  Gait:  antalgic    L Knee: Skin in tact without ulcerations or previous scars  Minimal Effusion  Tender to palpation at medial joint line  ROM: +5 - 90 with PF crepitus  Grossly stable to varus / valgus stress and posterior drawer   No evidence of neurovascular compromise distally  Special tests: None       Imaging  Multiple views of the left knee reviewed and show KL grade 4 severe patellofemoral arthritis with joint space narrowing and osteophyte formation in the medial and lateral compartments as well. No orders of the defined types were placed in this encounter. Assessment and Plan  Rohith Moncada was seen today for knee pain. Diagnoses and all orders for this visit:    Osteoarthritis of left knee, unspecified osteoarthritis type    Rohith Moncada has left knee osteoarthritis specifically with her patellofemoral joint which is end-stage in nature and causing her significant disability including multiple falls. She is exhausted nonoperative treatment as discussed in the HPI. We arrived at the mutual decision proceed with left total knee arthroplasty. Elevated BMI is a concern but neither the patient nor I believe she will be able to lose a significant mount of weight until her knee is able to be replaced. We did discuss the concerns about being overweight as at a young age and having a knee replacement for high risk of future loosening. I will pursue using a cementless knee replacement to mitigate these concerns. Patient is planning to see her PCP and get routine preop labs on January 19 and we will look to schedule her knee replacement at our earliest mutual convenience following that. Electronically signed by Marycarmen Fam MD on 6/61/1724 at 7:00 PM  This dictation was generated by voice recognition computer software. Although all attempts are made to edit the dictation for accuracy, there may be errors in the transcription that are not intended. Total time spent reviewing past notes, imaging, labs, clinical exam, and treatment plan was > 45 min.

## 2023-01-27 ENCOUNTER — OFFICE VISIT (OUTPATIENT)
Dept: FAMILY MEDICINE CLINIC | Age: 43
End: 2023-01-27

## 2023-01-27 VITALS
HEART RATE: 90 BPM | HEIGHT: 65 IN | DIASTOLIC BLOOD PRESSURE: 68 MMHG | SYSTOLIC BLOOD PRESSURE: 108 MMHG | RESPIRATION RATE: 16 BRPM | OXYGEN SATURATION: 97 % | BODY MASS INDEX: 46.32 KG/M2 | TEMPERATURE: 98.4 F | WEIGHT: 278 LBS

## 2023-01-27 DIAGNOSIS — E66.01 CLASS 3 SEVERE OBESITY DUE TO EXCESS CALORIES WITH SERIOUS COMORBIDITY AND BODY MASS INDEX (BMI) OF 45.0 TO 49.9 IN ADULT (HCC): ICD-10-CM

## 2023-01-27 DIAGNOSIS — Z01.818 PREOP EXAMINATION: ICD-10-CM

## 2023-01-27 DIAGNOSIS — F31.9 BIPOLAR DISEASE, CHRONIC (HCC): ICD-10-CM

## 2023-01-27 DIAGNOSIS — R11.2 POST-OPERATIVE NAUSEA AND VOMITING: ICD-10-CM

## 2023-01-27 DIAGNOSIS — I10 PRIMARY HYPERTENSION: ICD-10-CM

## 2023-01-27 DIAGNOSIS — L40.9 PSORIASIS: ICD-10-CM

## 2023-01-27 DIAGNOSIS — M17.12 PRIMARY OSTEOARTHRITIS OF LEFT KNEE: Primary | ICD-10-CM

## 2023-01-27 DIAGNOSIS — G80.8 OTHER CEREBRAL PALSY (HCC): ICD-10-CM

## 2023-01-27 DIAGNOSIS — F33.1 MDD (MAJOR DEPRESSIVE DISORDER), RECURRENT EPISODE, MODERATE (HCC): Chronic | ICD-10-CM

## 2023-01-27 DIAGNOSIS — E66.01 MORBID OBESITY WITH BMI OF 45.0-49.9, ADULT (HCC): ICD-10-CM

## 2023-01-27 DIAGNOSIS — Z98.890 POST-OPERATIVE NAUSEA AND VOMITING: ICD-10-CM

## 2023-01-27 RX ORDER — TRIAMCINOLONE ACETONIDE 0.25 MG/G
CREAM TOPICAL
Qty: 15 G | Refills: 0 | Status: SHIPPED | OUTPATIENT
Start: 2023-01-27

## 2023-01-27 ASSESSMENT — PATIENT HEALTH QUESTIONNAIRE - PHQ9
SUM OF ALL RESPONSES TO PHQ9 QUESTIONS 1 & 2: 0
2. FEELING DOWN, DEPRESSED OR HOPELESS: 0
7. TROUBLE CONCENTRATING ON THINGS, SUCH AS READING THE NEWSPAPER OR WATCHING TELEVISION: 0
10. IF YOU CHECKED OFF ANY PROBLEMS, HOW DIFFICULT HAVE THESE PROBLEMS MADE IT FOR YOU TO DO YOUR WORK, TAKE CARE OF THINGS AT HOME, OR GET ALONG WITH OTHER PEOPLE: 0
SUM OF ALL RESPONSES TO PHQ QUESTIONS 1-9: 0
SUM OF ALL RESPONSES TO PHQ QUESTIONS 1-9: 0
3. TROUBLE FALLING OR STAYING ASLEEP: 0
5. POOR APPETITE OR OVEREATING: 0
SUM OF ALL RESPONSES TO PHQ QUESTIONS 1-9: 0
6. FEELING BAD ABOUT YOURSELF - OR THAT YOU ARE A FAILURE OR HAVE LET YOURSELF OR YOUR FAMILY DOWN: 0
SUM OF ALL RESPONSES TO PHQ QUESTIONS 1-9: 0
4. FEELING TIRED OR HAVING LITTLE ENERGY: 0
1. LITTLE INTEREST OR PLEASURE IN DOING THINGS: 0
8. MOVING OR SPEAKING SO SLOWLY THAT OTHER PEOPLE COULD HAVE NOTICED. OR THE OPPOSITE, BEING SO FIGETY OR RESTLESS THAT YOU HAVE BEEN MOVING AROUND A LOT MORE THAN USUAL: 0
9. THOUGHTS THAT YOU WOULD BE BETTER OFF DEAD, OR OF HURTING YOURSELF: 0

## 2023-01-27 ASSESSMENT — ENCOUNTER SYMPTOMS
CHEST TIGHTNESS: 0
GASTROINTESTINAL NEGATIVE: 1
CONSTIPATION: 0
COUGH: 0
BLOOD IN STOOL: 0
SHORTNESS OF BREATH: 0
DIARRHEA: 0

## 2023-01-27 NOTE — PROGRESS NOTES
Ricky Robert  YOB: 1980    Date of Service:  1/27/2023      Wt Readings from Last 2 Encounters:   01/27/23 278 lb (126.1 kg)   01/10/23 287 lb (130.2 kg)       BP Readings from Last 3 Encounters:   01/27/23 108/68   09/29/22 118/80   08/05/22 118/86        Chief Complaint   Patient presents with    Pre-op Exam     Left total knee replacement on 8/06 by Dr Shanti Aparicio at [place] [address]   Allergen Reactions    Sulfa Antibiotics     Amoxicillin Rash    Ultram [Tramadol] Nausea And Vomiting    Vicodin [Hydrocodone-Acetaminophen] Nausea And Vomiting       Outpatient Medications Marked as Taking for the 1/27/23 encounter (Office Visit) with HENRY Tabares CNP   Medication Sig Dispense Refill    triamcinolone (KENALOG) 0.025 % cream Apply topically 2 times daily. 15 g 0    pantoprazole (PROTONIX) 40 MG tablet Take 1 tablet by mouth every morning (before breakfast) 30 tablet 2    benztropine (COGENTIN) 1 MG tablet Take 1 mg by mouth daily      metoprolol succinate (TOPROL XL) 50 MG extended release tablet Take 1 tablet by mouth daily 30 tablet 11    ARIPiprazole (ABILIFY) 10 MG tablet Take 10 mg by mouth daily      busPIRone (BUSPAR) 15 MG tablet Take 15 mg by mouth 2 in am; 1 qhs      lamoTRIgine (LAMICTAL) 200 MG tablet Take 200 mg by mouth nightly       prazosin (MINIPRESS) 2 MG capsule Take 2 mg by mouth nightly       escitalopram (LEXAPRO) 20 MG tablet TAKE ONE TABLET BY MOUTH DAILY (Patient taking differently: Take 20 mg by mouth daily TAKE ONE TABLET BY MOUTH DAILY) 30 tablet 3       This patient presents to the office todayfor a preoperative consultation at the request of surgeon, Dr. Kp Hernández, who plans on performing Left Total Knee Arthroscopy on February 16 at [facility].  The current problem began10 years ago, and symptoms have been worsening with time.   Conservative therapy: activity modification, rest, Mobic, physical therapy, steroid and gel injections with has not improved    Functional Assessment:  Exercise tolerance: 9.89 METS using the DASI calculator  Denies any current chest pain or discomfort, sob or MCMANUS, orthopnea, PND or leg swelling. Medications: reviewed, Over the counter (NSAIDs) use: Ibuprofen    Cardiac Risk:  High-risk Surgery: no / Hx of ischemic heart disease: no / Hx of CHF:no / Hx of CVA:no / Pre-op insulin: no / Pre-op creatinine >2.0: no (last cr 1.1 )    SHIRLEY Screen:  Snore loudly? yes /  Feel tired/fatigued during the day? yes  /  Stop breathing while sleeping? no / High blood pressure? yes / Morning HA? no    Planned anesthesia: General   Known anesthesia problems: Past general anesthesia with complications- [place]   Bleeding risk: No recent or remote history of abnormal bleeding  Personal or FH of DVT/PE: No    Patient objection to receiving blood products: No    - Declined pregnancy test today. No- has IUD  - Support systems after surgery yes  - Smoking/ alcohol/drugs no  - Complicating medical diseases are currently well controlled. yes      Past Medical History:   Diagnosis Date    Bipolar disorder ([facility]     Cerebral palsy ([facility]     legs;mainly right    Depression     Hypertension        Past Surgical History:   Procedure Laterality Date    FOOT SURGERY      KNEE SURGERY Left 6/12/15    LEG SURGERY         No family history on file.     Social History     Socioeconomic History    Marital status:      Spouse name: Not on file    Number of children: Not on file    Years of education: Not on file    Highest education level: Not on file   Occupational History    Not on file   Tobacco Use    Smoking status: Never    Smokeless tobacco: Never   Substance and Sexual Activity    Alcohol use: No     Alcohol/week: 0.0 standard drinks    Drug use: No    Sexual activity: Yes     Partners: Male   Other Topics Concern    Not on file   Social History Narrative    8/2012 lives with parents and 2 sons;and brother;unemployed; Social Determinants of Health     Financial Resource Strain: Not on file   Food Insecurity: Not on file   Transportation Needs: Not on file   Physical Activity: Not on file   Stress: Not on file   Social Connections: Not on file   Intimate Partner Violence: Not on file   Housing Stability: Not on file       Review of Systems  Review of Systems   Constitutional: Negative. Respiratory:  Negative for cough, chest tightness and shortness of breath. Cardiovascular: Negative. Gastrointestinal: Negative. Negative for blood in stool, constipation and diarrhea. Musculoskeletal: Negative. Skin: Negative. Neurological:  Negative for dizziness, tremors, light-headedness and headaches. Psychiatric/Behavioral:  Negative for decreased concentration, dysphoric mood, self-injury, sleep disturbance and suicidal ideas. The patient is not nervous/anxious. Vitals:    01/27/23 1410   BP: 108/68   Pulse: 90   Resp: 16   Temp: 98.4 °F (36.9 °C)   TempSrc: Infrared   SpO2: 97%   Weight: 278 lb (126.1 kg)   Height: 5' 5\" (1.651 m)        Physical Exam   Physical Exam  Vitals reviewed. Constitutional:       Appearance: Normal appearance. HENT:      Head: Normocephalic. Right Ear: Tympanic membrane normal.      Left Ear: Tympanic membrane normal.      Nose: Nose normal.      Mouth/Throat:      Mouth: Mucous membranes are moist.   Eyes:      Extraocular Movements: Extraocular movements intact. Conjunctiva/sclera: Conjunctivae normal.      Pupils: Pupils are equal, round, and reactive to light. Neck:      Vascular: No carotid bruit. Cardiovascular:      Rate and Rhythm: Normal rate and regular rhythm. Pulses: Normal pulses. Carotid pulses are 2+ on the right side and 2+ on the left side. Dorsalis pedis pulses are 2+ on the right side and 2+ on the left side. Posterior tibial pulses are 2+ on the right side and 2+ on the left side. Heart sounds: Normal heart sounds. No murmur heard. No gallop. Pulmonary:      Effort: Pulmonary effort is normal.      Breath sounds: Normal breath sounds. Abdominal:      General: Abdomen is flat. Palpations: Abdomen is soft. Musculoskeletal:         General: Normal range of motion. Cervical back: Normal range of motion. Right lower leg: No edema. Left lower leg: No edema. Lymphadenopathy:      Cervical: No cervical adenopathy. Skin:     General: Skin is warm and dry. Capillary Refill: Capillary refill takes less than 2 seconds. Neurological:      General: No focal deficit present. Mental Status: She is alert and oriented to person, place, and time. Psychiatric:         Mood and Affect: Mood normal.         Behavior: Behavior normal.       EKG Interpretation:  normal EKG, normal sinus rhythm, unchanged from previous tracings. Lab Review   No visits with results within 2 Month(s) from this visit.    Latest known visit with results is:   Office Visit on 09/29/2022   Component Date Value    TSH 09/29/2022 2.30     Sodium 09/29/2022 139     Potassium 09/29/2022 4.1     Chloride 09/29/2022 100     CO2 09/29/2022 26     Anion Gap 09/29/2022 13     Glucose 09/29/2022 112 (A)     BUN 09/29/2022 12     Creatinine 09/29/2022 1.1     GFR Non- 09/29/2022 54 (A)     GFR  09/29/2022 >60     Calcium 09/29/2022 9.8     Total Protein 09/29/2022 7.2     Albumin 09/29/2022 4.3     Albumin/Globulin Ratio 09/29/2022 1.5     Total Bilirubin 09/29/2022 <0.2     Alkaline Phosphatase 09/29/2022 72     ALT 09/29/2022 18     AST 09/29/2022 17     Hemoglobin A1C 09/29/2022 5.1     eAG 09/29/2022 99.7     Cholesterol, Total 09/29/2022 216 (A)     Triglycerides 09/29/2022 185 (A)     HDL 09/29/2022 50     LDL Calculated 09/29/2022 129 (A)     VLDL Cholesterol Calcula* 09/29/2022 37     WBC 09/29/2022 5.6     RBC 09/29/2022 4.96     Hemoglobin 09/29/2022 13.5     Hematocrit 09/29/2022 41.0     MCV 09/29/2022 82.6     MCH 09/29/2022 27.3     MCHC 09/29/2022 33.1     RDW 09/29/2022 14.4     Platelets 57/42/7871 302     MPV 09/29/2022 7.0     Neutrophils % 09/29/2022 62.1     Lymphocytes % 09/29/2022 27.1     Monocytes % 09/29/2022 7.9     Eosinophils % 09/29/2022 2.0     Basophils % 09/29/2022 0.9     Neutrophils Absolute 09/29/2022 3.5     Lymphocytes Absolute 09/29/2022 1.5     Monocytes Absolute 09/29/2022 0.4     Eosinophils Absolute 09/29/2022 0.1     Basophils Absolute 09/29/2022 0.1     Vit D, 25-Hydroxy 09/29/2022 17.7 (A)            Assessment:       37 y.o. patient with planned surgery as above. Known risk factors for perioperative complications: Anesthesia concerns- Nausea, Hypertension  Current medications which may produce withdrawal symptoms if withheld perioperatively: none   1. Primary osteoarthritis of left knee  Continue care with Dr. Armando Jenkins, appreciate assistance  - MRSA DNA PROBE; Future    2. Preop examination  Low Risk for surgery  ECG showing SR with normal QRS, Twave abnormality  Forms completed  Needs to go to hospital for labs  - APTT; Future  - Culture, Urine; Future  - MRSA DNA PROBE; Future    3. Post-operative nausea and vomiting  Encouraged to discuss with anesthesia  - Culture, Urine; Future    4. Primary hypertension  Stable today  Continue metoprolol  - EKG 12 Lead  - CBC with Auto Differential; Future  - Comprehensive Metabolic Panel; Future  - Protime-INR; Future  - APTT; Future  - Urinalysis with Microscopic; Future  - Culture, Urine; Future  - Transferrin; Future    5. Other cerebral palsy ([facility]  Stable today  Continue current meds  - Vitamin D 25 Hydroxy; Future  - Transferrin; Future    6. Bipolar disease, chronic ([facility]  Stable today  Continue current meds    7. MDD (major depressive disorder), recurrent episode, moderate (HCC)  Stable today  Continue current meids    8.  Morbid obesity with BMI of 45.0-49.9, adult ([facility]  Stable today  - Hemoglobin A1C; Future  - Prealbumin; Future    10. Psoriasis  Start tAC cream  - triamcinolone (KENALOG) 0.025 % cream; Apply topically 2 times daily. Dispense: 15 g; Refill: 0       Plan:     1. Preoperative workup as follows:hemoglobin, hematocrit, electrolytes, glucose, liver function studies  2. Change in medication regimen before surgery: Take Metoprolol/ Pantoprazole on morning of surgery with sip of water, and hold all other medications until after surgery  3. Prophylaxis forcardiac events with perioperative beta-blockers: Currently taking  metoprolol  ACC/AHA indications for pre-operative beta-blocker use:    Vascular surgery with history of postitive stress test  Intermediate or high risk surgery with history of CAD   Intermediate or high risk surgery with multiple clinical predictors of CAD- 2 of the following: history of compensated or prior heart failure, history of cerebrovascular disease, DM, or renal insufficiency    Routine administration of higher-dose, long-acting metoprolol in beta-blocker-naïve patients on the day of surgery, and in the absence of dose titration is associated with an overall increase in mortality. Beta-blockers should be started days to weeks prior to surgery and titrated to pulse < 70.  4. Deep vein thrombosis prophylaxis: regimen to be chosen by surgical team  5.  No contraindications to planned surgery           HENRY Vergara - CNP

## 2023-01-27 NOTE — PROGRESS NOTES
Pre Op Med History  Cold/Chronic Cough/Tuberculosis  --  no  Bronchitis/COPD  --  no  Asthma/SOB  --  no  Rheumatic Fever/Heart Murmur  --  no  High Blood Pressure/Low Blood Pressure  --  yes -   Swelling of Feet/Fluid In Lungs  --  no  Heart Attack/Chest Pain  --  no  Irregular, Fast Heartbeats  --  no  Do you bruise easily?   --  no  Anemia  --  no  Diabetes/Low Blood Sugar  --  no  Are you Pregnant  --  no  Last Menstrual Period  --  has IUD  Serious Illness During Pregnancy  --  pre eclampsia  Breast Disease  --  no  Kidney Disease  --  no  Jaundice/Hepatitis  --  no  Hiatal Hernia/Peptic Ulcer Disease  --  no  Convulsions/Epilepsy/Stroke  --  no  Polio/Meningitis Paralysis  --  no  Back Pain/ Slipped Disc  --  no  Psychological Disease  --  yes -   Thyroid Disease  --  no  Glaucoma/Visual Impairment  --  no  Skeletal Deformities/Defects/Arthritis --  yes - cerebral palsy   Loose Teeth/Caps on Front Teeth  --  no  Have you had any Surgery  --  yes -   Any History of anesthesia complication in self or family  -- yes - very nauseous   Prostate Disease  --  N/A  Cancer  --  no  DVT/PE/PVD --  no  Weight Loss/Gain  --  no

## 2023-01-30 ENCOUNTER — TELEPHONE (OUTPATIENT)
Dept: ORTHOPEDIC SURGERY | Age: 43
End: 2023-01-30

## 2023-01-30 NOTE — TELEPHONE ENCOUNTER
ORTHOPAEDIC NURSE NAVIGATOR SUMMARY NOTE      Anticipated Date of Surgery: 2/16/23    Recieved Pre-Op Education: yes   In person class:no  Pt used educational link:Yes   Pt completed pre and post test to measure learning:Yes    If pt did not complete either, why not? N/A  ERAS protocol explained to pt. Pt does not have the following medical conditions:  -Diabetes  -Gastroparesis  -CHF  -Fluid restricted diet  -Known difficult airway  Pt instructed to drink up to 40 oz of Gatorade type drink the evening prior to surgery. Pt informed they can have up to 40 oz of water and that it must be completed 2 hours prior to scheduled surgery. Pt verbalized understanding. PCP: HENRY Hall Ala, CNP   Phone #: [phone]    Date of PCP Visit for H&P: 1/27/23    Any Noted Concerns from PCP prior to surgery:  No   If Yes, what concerns?:    IS THE PATIENT IN A PAIN MANAGEMENT PROGRAM?:   No     Review of Past Medical History Reveals History of:      Critical Lab Values:   Hgb/Hct:   Hemoglobin (g/dL)   Date Value   09/29/2022 13.5   /  Hematocrit (%)   Date Value   09/29/2022 41.0      HgbA1C:    Lab Results   Component Value Date    LABA1C 5.1 09/29/2022    LABA1C 4.9 01/11/2019      Albumin:    Lab Results   Component Value Date    LABALBU 4.3 09/29/2022      BUN/Cr:   BUN (mg/dL)   Date Value   09/29/2022 12   /  Creatinine (mg/dL)   Date Value   09/29/2022 1.1      BMI:    BMI Readings from Last 1 Encounters:   01/27/23 46.26 kg/m²        Coronary Artery Disease/HTN/CHF History: Yes-HTN      Cardiologist:     Cardiac Clearance Necessary: No    Date of Cardiac Clearance Appt: On Plavix? No,  If YES, when will they stop taking?     Final Cardiac Recommendations:N/A   -On any anticoagulation-none       Diabetes History: No    Most Recent HgbA1C: N/A    PCP or Endocrine Recommendations: N/A    Nutritionist/Dietician Consult Scheduled: N/A    Final Plan For Diabetic Control: N/A   Pulmonary: COPD/Emphysema/ Use of home oxygen: NONE     Alcohol use:        DVT Risk Stratification:  Low      Vascular Consult Ordered:  NA    Date of Vascular Appt:     Hematology/Oncology Consult Ordered:  NA    Date of Hematology/Oncology Appt:     Final Recommendation For DVT Prophylaxis:   -Smoking history or use of estrogen-         BMI Greater than 40 at time of scheduling?: Yes    Has Surgeon been notified of BMI concern? No    Weight Loss Clinic Consult Ordered: No    Date of Wt Loss Clinic Appt:     BMI at time of surgery (if went through [facility]): Additional Medical Concerns:         Discharge Disposition Information:     Attended Pre-Hab Program: no     Anticipated Discharge Disposition:  Home with OP PT    Who will be with patient at home following discharge?   Mother to stay with pt      Equipment pt already has:  Daniel 23 on first or second floor: First   Bathroom on first or second floor: First   Weight bearing status: full    Pre-op ambulatory status: none   Number of entry steps: Zero   Caregiver assistance: full time    Pt plan [facility] same day[facility]: JORGE Dang RN  1/30/2023

## 2023-02-02 ENCOUNTER — HOSPITAL ENCOUNTER (OUTPATIENT)
Age: 43
Discharge: HOME | End: 2023-02-02
Payer: COMMERCIAL

## 2023-02-02 DIAGNOSIS — Z98.890 POST-OPERATIVE NAUSEA AND VOMITING: ICD-10-CM

## 2023-02-02 DIAGNOSIS — R11.2 NON-INTRACTABLE VOMITING WITH NAUSEA: ICD-10-CM

## 2023-02-02 DIAGNOSIS — K21.00 GASTROESOPHAGEAL REFLUX DISEASE WITH ESOPHAGITIS WITHOUT HEMORRHAGE: ICD-10-CM

## 2023-02-02 DIAGNOSIS — R11.2 POST-OPERATIVE NAUSEA AND VOMITING: ICD-10-CM

## 2023-02-02 DIAGNOSIS — E66.01 MORBID OBESITY WITH BMI OF 45.0-49.9, ADULT (HCC): ICD-10-CM

## 2023-02-02 DIAGNOSIS — G80.8 OTHER CEREBRAL PALSY (HCC): ICD-10-CM

## 2023-02-02 DIAGNOSIS — I10 PRIMARY HYPERTENSION: ICD-10-CM

## 2023-02-02 DIAGNOSIS — I10 ESSENTIAL HYPERTENSION: ICD-10-CM

## 2023-02-02 DIAGNOSIS — Z01.818 PREOP EXAMINATION: ICD-10-CM

## 2023-02-02 DIAGNOSIS — M17.12 PRIMARY OSTEOARTHRITIS OF LEFT KNEE: ICD-10-CM

## 2023-02-02 LAB
A/G RATIO: 1.2 (ref 1.1–2.2)
ALBUMIN SERPL-MCNC: 4.1 G/DL (ref 3.4–5)
ALP BLD-CCNC: 78 U/L (ref 40–129)
ALT SERPL-CCNC: 12 U/L (ref 10–40)
ANION GAP SERPL CALCULATED.3IONS-SCNC: 11 MMOL/L (ref 3–16)
APTT: 25.2 SEC (ref 23–34.3)
AST SERPL-CCNC: 11 U/L (ref 15–37)
BACTERIA: ABNORMAL /HPF
BASOPHILS ABSOLUTE: 0.1 K/UL (ref 0–0.2)
BASOPHILS RELATIVE PERCENT: 0.7 %
BILIRUB SERPL-MCNC: <0.2 MG/DL (ref 0–1)
BILIRUBIN URINE: NEGATIVE
BLOOD, URINE: ABNORMAL
BUN BLDV-MCNC: 10 MG/DL (ref 7–20)
CALCIUM SERPL-MCNC: 10.1 MG/DL (ref 8.3–10.6)
CHLORIDE BLD-SCNC: 100 MMOL/L (ref 99–110)
CLARITY: CLEAR
CO2: 26 MMOL/L (ref 21–32)
COLOR: YELLOW
COMMENT UA: ABNORMAL
CREAT SERPL-MCNC: 1 MG/DL (ref 0.6–1.1)
EOSINOPHILS ABSOLUTE: 0.2 K/UL (ref 0–0.6)
EOSINOPHILS RELATIVE PERCENT: 2.5 %
EPITHELIAL CELLS, UA: ABNORMAL /HPF (ref 0–5)
GFR SERPL CREATININE-BSD FRML MDRD: >60 ML/MIN/{1.73_M2}
GLUCOSE BLD-MCNC: 101 MG/DL (ref 70–99)
GLUCOSE URINE: NEGATIVE MG/DL
HCT VFR BLD CALC: 42 % (ref 36–48)
HEMOGLOBIN: 14 G/DL (ref 12–16)
INR BLD: 0.92 (ref 0.87–1.14)
KETONES, URINE: NEGATIVE MG/DL
LEUKOCYTE ESTERASE, URINE: ABNORMAL
LYMPHOCYTES ABSOLUTE: 2.2 K/UL (ref 1–5.1)
LYMPHOCYTES RELATIVE PERCENT: 29.1 %
MCH RBC QN AUTO: 26.4 PG (ref 26–34)
MCHC RBC AUTO-ENTMCNC: 33.2 G/DL (ref 31–36)
MCV RBC AUTO: 79.4 FL (ref 80–100)
MICROSCOPIC EXAMINATION: YES
MONOCYTES ABSOLUTE: 0.5 K/UL (ref 0–1.3)
MONOCYTES RELATIVE PERCENT: 6.7 %
MUCUS: ABNORMAL /LPF
NEUTROPHILS ABSOLUTE: 4.6 K/UL (ref 1.7–7.7)
NEUTROPHILS RELATIVE PERCENT: 61 %
NITRITE, URINE: NEGATIVE
PDW BLD-RTO: 14.9 % (ref 12.4–15.4)
PH UA: 6 (ref 5–8)
PLATELET # BLD: 320 K/UL (ref 135–450)
PMV BLD AUTO: 7.2 FL (ref 5–10.5)
POTASSIUM SERPL-SCNC: 3.6 MMOL/L (ref 3.5–5.1)
PROTEIN UA: NEGATIVE MG/DL
PROTHROMBIN TIME: 12.2 SEC (ref 11.7–14.5)
RBC # BLD: 5.3 M/UL (ref 4–5.2)
RBC UA: ABNORMAL /HPF (ref 0–4)
SODIUM BLD-SCNC: 137 MMOL/L (ref 136–145)
SPECIFIC GRAVITY UA: 1.02 (ref 1–1.03)
TOTAL PROTEIN: 7.6 G/DL (ref 6.4–8.2)
URINE TYPE: ABNORMAL
UROBILINOGEN, URINE: 0.2 E.U./DL
WBC # BLD: 7.5 K/UL (ref 4–11)
WBC UA: ABNORMAL /HPF (ref 0–5)

## 2023-02-02 PROCEDURE — 82306 VITAMIN D 25 HYDROXY: CPT

## 2023-02-02 PROCEDURE — 87641 MR-STAPH DNA AMP PROBE: CPT

## 2023-02-02 PROCEDURE — 81001 URINALYSIS AUTO W/SCOPE: CPT

## 2023-02-02 PROCEDURE — 84134 ASSAY OF PREALBUMIN: CPT

## 2023-02-02 PROCEDURE — 80053 COMPREHEN METABOLIC PANEL: CPT

## 2023-02-02 PROCEDURE — 83036 HEMOGLOBIN GLYCOSYLATED A1C: CPT

## 2023-02-02 PROCEDURE — 87086 URINE CULTURE/COLONY COUNT: CPT

## 2023-02-02 PROCEDURE — 84466 ASSAY OF TRANSFERRIN: CPT

## 2023-02-02 PROCEDURE — 36415 COLL VENOUS BLD VENIPUNCTURE: CPT

## 2023-02-02 PROCEDURE — 85730 THROMBOPLASTIN TIME PARTIAL: CPT

## 2023-02-02 PROCEDURE — 85610 PROTHROMBIN TIME: CPT

## 2023-02-02 PROCEDURE — 85025 COMPLETE CBC W/AUTO DIFF WBC: CPT

## 2023-02-02 NOTE — TELEPHONE ENCOUNTER
Future appt scheduled 0 appt scheduled                  Last appt 01/27/2023      Last Written     pantoprazole (PROTONIX) 40 MG tablet  10/24/2022  #30  2 RF     metoprolol succinate (TOPROL XL) 50 MG extended release tablet  02/08/2022  #30  11 RF

## 2023-02-03 ENCOUNTER — TELEPHONE (OUTPATIENT)
Dept: FAMILY MEDICINE CLINIC | Age: 43
End: 2023-02-03

## 2023-02-03 LAB
ESTIMATED AVERAGE GLUCOSE: 102.5 MG/DL
HBA1C MFR BLD: 5.2 %
MRSA SCREEN RT-PCR: ABNORMAL
ORGANISM: ABNORMAL
PREALBUMIN: 17.3 MG/DL (ref 20–40)
TRANSFERRIN: 226 MG/DL (ref 200–360)
VITAMIN D 25-HYDROXY: 20.2 NG/ML

## 2023-02-03 NOTE — TELEPHONE ENCOUNTER
Patient had blood work done yesterday for Preop, states that she saw results on mychart that she was positive for mrsa and contact precautions are indicated. She would like more information about what all of this means? Please advise.

## 2023-02-03 NOTE — TELEPHONE ENCOUNTER
Many of us harbor MRSA in our nasal passage. Contact precautions mean that MRSA spread through contact of bodily fluids.   I would not be concerned about this at this time

## 2023-02-04 LAB — URINE CULTURE, ROUTINE: NORMAL

## 2023-02-05 RX ORDER — METOPROLOL SUCCINATE 50 MG/1
50 TABLET, EXTENDED RELEASE ORAL DAILY
Qty: 28 TABLET | Refills: 0 | Status: SHIPPED | OUTPATIENT
Start: 2023-02-05

## 2023-02-05 RX ORDER — PANTOPRAZOLE SODIUM 40 MG/1
40 TABLET, DELAYED RELEASE ORAL
Qty: 28 TABLET | Refills: 0 | Status: SHIPPED | OUTPATIENT
Start: 2023-02-05

## 2023-02-05 NOTE — RESULT ENCOUNTER NOTE
Cleared for surgery. Labs overall look good. No anemia/ infection. Normal kidney/ liver infection. NO diabetes. Normal clotting. Low Vitamin D- I sent in a weekly med to fix this. Some bacteria in your urine- do not need an antibiotic for this. You do have a staph infection in your nose. This is a common finding and can cause infection on day of surgery. The surgeon is going to want you to put an ointment up your nose several days prior to surgery. I have sent this in the pharmacy but follow the surgeon's instructions for how they want you to use it. Staff please make sure surgeon's office is aware of these labs. I think he is with [facility]. Thanks.

## 2023-02-06 ENCOUNTER — TELEPHONE (OUTPATIENT)
Dept: ORTHOPEDIC SURGERY | Age: 43
End: 2023-02-06

## 2023-02-06 NOTE — TELEPHONE ENCOUNTER
PA requsted via [employer] by fax @ [phone] w/clinicals.   [employer]  Reference # unknown ~ Use ID # [id number]

## 2023-02-08 NOTE — TELEPHONE ENCOUNTER
Per Odalis Huffman @ [facility], still pending.    Ref # [id number]    Also they are asking for information about PT (plans pre/post surgery)

## 2023-02-10 NOTE — TELEPHONE ENCOUNTER
[facility] still [facility].    Ref # [id number]    Also they are asking for information about PT (plans pre/post surgery)

## 2023-02-13 NOTE — TELEPHONE ENCOUNTER
DENIED  Date: 02/16/23 thru 03/18/23  Type of SX: Outpatient  Location: [facility]  CPT: [id number]  SX area: [facility]  REASON: Lack of conservative treatment  Peer to peer: [phone]  Reference # [id number]

## 2023-02-24 ENCOUNTER — TELEPHONE (OUTPATIENT)
Dept: FAMILY MEDICINE CLINIC | Age: 43
End: 2023-02-24

## 2023-02-24 NOTE — TELEPHONE ENCOUNTER
Patient saw Fanny Camara DNP on 1/27/23 for a pre-op. Surgery was cancelled because insurance would not approve. Surgery has been rescheduled to 3-15-23. Surgeon told patient to call and see if date on pre-op form can be changed and faxed.

## 2023-02-24 NOTE — PROGRESS NOTES
Felisa Rector    Age 37 y.o.    female    1980    MRN [id number]    3/15/2023  Arrival Time_____________  OR Time____________[address]     Procedure(s):  LEFT TOTAL KNEE ARTHROPLASTY           RONEL BIOMET NOTE: ADDUCTOR CANAL BLOCK, IPAC                      General   Surgeon(s):  Roger Bonilla, MD      DAY ADMIT ___  SDS/OP ___  OUTPT IN BED ___        Phone [phone] (home)     PCP _____________________ Phone_________________ Epic ( ) Epic CE ( ) Appt ________    ADDITIONAL INFO __________________________________ Cardio/Consult _____________    NOTES _____________________________________________________________________    ____________________________________________________________________________    PAT APPT DATE:________ TIME: ________  FAXED QAD: _______  (__) H&P w/ Hospitalist  __________________________________________________________________________  Preop Nurse phone screen complete: _____________  (__) CBC     (__) W/ DIFF ___________     (__) Hgb A1C    ___________  (__) CHEST X RAY   __________  (__) LIPID PROFILE  ___________  (__) EKG   __________  (__) PT/PTT   ___________  (__) PFT's   __________  (__) BMP   ___________  (__) CAROTIDS  __________  (__) CMP   ___________  (__) VEIN MAPPING  __________  (__) U/A   ___________  (__) HISTORY & PHYSICAL __________  (__) URINE C & S  ___________  (__) CARDIAC CLEARANCE __________  (__) U/A W/ FLEX  ___________  (__) PULM.  CLEARANCE __________  (__) SERUM PREGNANCY ___________  (__) Check Epic DOS orders __________  (__) TYPE & SCREEN __________repeat ( ) (__)  __________________ __________  (__) ALBUMIN   ___________  (__)  __________________ __________  (__) TRANSFERRIN  ___________  (__)  __________________ __________  (__) LIVER PROFILE  ___________  (__)  __________________ __________  (__) MRSA NASAL SWAB ___________  (__) URINE PREG DOS __________  (__) SED RATE  ___________  (__) BLOOD SUGAR DOS __________  (__) C-REACTIVE PROTEIN ___________    (__) VITAMIN D HYDROXY ___________  (__) BLOOD THINNERS __________    (__) ACE/ ARBS: _____________________     (__) BETABLOCKERS __________________

## 2023-02-28 ENCOUNTER — TELEMEDICINE (OUTPATIENT)
Dept: FAMILY MEDICINE CLINIC | Age: 43
End: 2023-02-28
Payer: COMMERCIAL

## 2023-02-28 ENCOUNTER — NURSE ONLY (OUTPATIENT)
Dept: FAMILY MEDICINE CLINIC | Age: 43
End: 2023-02-28

## 2023-02-28 DIAGNOSIS — J06.9 VIRAL URI: Primary | ICD-10-CM

## 2023-02-28 LAB — S PYO AG THROAT QL: NORMAL

## 2023-02-28 PROCEDURE — 99441 PR PHYS/QHP TELEPHONE EVALUATION 5-10 MIN: CPT | Performed by: NURSE PRACTITIONER

## 2023-02-28 RX ORDER — HYDROXYZINE PAMOATE 25 MG/1
1 CAPSULE ORAL 3 TIMES DAILY PRN
COMMUNITY
Start: 2023-02-24

## 2023-02-28 SDOH — ECONOMIC STABILITY: FOOD INSECURITY: WITHIN THE PAST 12 MONTHS, THE FOOD YOU BOUGHT JUST DIDN'T LAST AND YOU DIDN'T HAVE MONEY TO GET MORE.: NEVER TRUE

## 2023-02-28 SDOH — ECONOMIC STABILITY: INCOME INSECURITY: HOW HARD IS IT FOR YOU TO PAY FOR THE VERY BASICS LIKE FOOD, HOUSING, MEDICAL CARE, AND HEATING?: NOT HARD AT ALL

## 2023-02-28 SDOH — ECONOMIC STABILITY: HOUSING INSECURITY
IN THE LAST 12 MONTHS, WAS THERE A TIME WHEN YOU DID NOT HAVE A STEADY PLACE TO SLEEP OR SLEPT IN A SHELTER (INCLUDING NOW)?: NO

## 2023-02-28 SDOH — ECONOMIC STABILITY: FOOD INSECURITY: WITHIN THE PAST 12 MONTHS, YOU WORRIED THAT YOUR FOOD WOULD RUN OUT BEFORE YOU GOT MONEY TO BUY MORE.: NEVER TRUE

## 2023-02-28 ASSESSMENT — ENCOUNTER SYMPTOMS
SHORTNESS OF BREATH: 0
APNEA: 0
WHEEZING: 0
GASTROINTESTINAL NEGATIVE: 1
COUGH: 1
CHEST TIGHTNESS: 0
SINUS PAIN: 0
CHOKING: 0
FACIAL SWELLING: 0
STRIDOR: 0
TROUBLE SWALLOWING: 0
SORE THROAT: 1
RHINORRHEA: 0
SINUS PRESSURE: 0
EYES NEGATIVE: 1

## 2023-02-28 ASSESSMENT — PATIENT HEALTH QUESTIONNAIRE - PHQ9
4. FEELING TIRED OR HAVING LITTLE ENERGY: 0
7. TROUBLE CONCENTRATING ON THINGS, SUCH AS READING THE NEWSPAPER OR WATCHING TELEVISION: 0
SUM OF ALL RESPONSES TO PHQ QUESTIONS 1-9: 0
3. TROUBLE FALLING OR STAYING ASLEEP: 0
2. FEELING DOWN, DEPRESSED OR HOPELESS: 0
5. POOR APPETITE OR OVEREATING: 0
10. IF YOU CHECKED OFF ANY PROBLEMS, HOW DIFFICULT HAVE THESE PROBLEMS MADE IT FOR YOU TO DO YOUR WORK, TAKE CARE OF THINGS AT HOME, OR GET ALONG WITH OTHER PEOPLE: 0
SUM OF ALL RESPONSES TO PHQ9 QUESTIONS 1 & 2: 0
SUM OF ALL RESPONSES TO PHQ QUESTIONS 1-9: 0
6. FEELING BAD ABOUT YOURSELF - OR THAT YOU ARE A FAILURE OR HAVE LET YOURSELF OR YOUR FAMILY DOWN: 0
SUM OF ALL RESPONSES TO PHQ QUESTIONS 1-9: 0
8. MOVING OR SPEAKING SO SLOWLY THAT OTHER PEOPLE COULD HAVE NOTICED. OR THE OPPOSITE, BEING SO FIGETY OR RESTLESS THAT YOU HAVE BEEN MOVING AROUND A LOT MORE THAN USUAL: 0
9. THOUGHTS THAT YOU WOULD BE BETTER OFF DEAD, OR OF HURTING YOURSELF: 0
1. LITTLE INTEREST OR PLEASURE IN DOING THINGS: 0
SUM OF ALL RESPONSES TO PHQ QUESTIONS 1-9: 0

## 2023-02-28 NOTE — RESULT ENCOUNTER NOTE
Negative Strep. Monitor symptoms for now. Drink lots of fluids. Try OTC meds like Coricidin which may help. Can try warm salt water gargles which may help symptoms. If no improvement by Friday we can consider antibiotics.

## 2023-02-28 NOTE — PROGRESS NOTES
Germán Whittington (:  1980) is a Established patient, here for evaluation of the following:    Assessment & Plan   Below is the assessment and plan developed based on review of pertinent history, physical exam, labs, studies, and medications. 1. Viral URI  Requesting Strep testing- pt will come by later this AM.  Discussed antibiotic use- usually need to wait for being sick 7-10 days   Encouraged OTC cough/ cold medication, Tylenol/ Ibuprofen PRN pain  Discussed non pharmacologic measures: Encouraged frequent water intake. Encouraged Gatorade/ Powerade if not drinking fluids, intake of honey/ lemon tea for sore throat, Humidifier at night for cough, hot showers, Netti Pot if needed for congestion. If no improvement, encouraged pt to call for F/U labs/ imaging    Return if symptoms worsen or fail to improve. Subjective   Sore throat start Friday, start coughing 2 days ago. Associated symptoms include difficulty swallowing No fever, ear pain, headache, shortness of breath, chest pain. Rapidly getting worse. Kids not sick with similar symptoms. Has not checked for COVID. OTC meds: Ibuprofen (mild relief). Review of Systems   Constitutional: Negative. HENT:  Positive for congestion (nasal), postnasal drip, sneezing and sore throat. Negative for dental problem, drooling, ear discharge, ear pain, facial swelling, hearing loss, mouth sores, nosebleeds, rhinorrhea, sinus pressure, sinus pain, tinnitus and trouble swallowing. Eyes: Negative. Respiratory:  Positive for cough. Negative for apnea, choking, chest tightness, shortness of breath, wheezing and stridor. Cardiovascular: Negative. Gastrointestinal: Negative. Genitourinary: Negative. Musculoskeletal: Negative. Neurological: Negative. Hematological: Negative. Psychiatric/Behavioral: Negative.           Objective   Patient-Reported Vitals  No data recorded     Physical Exam  NO PE due to telephone encounter    On this date 2/28/2023 I have spent 7 minutes reviewing previous notes, test results and face to face (virtual) with the patient discussing the diagnosis and importance of compliance with the treatment plan as well as documenting on the day of the visit. Germán Whittington, was evaluated through a synchronous (real-time) audio-video encounter. The patient (or guardian if applicable) is aware that this is a billable service, which includes applicable co-pays. This Virtual Visit was conducted with patient's (and/or legal guardian's) consent. The visit was conducted pursuant to the emergency declaration under the [facility], [facility] authority and the [employer] Act. Patient identification was verified, and a caregiver was present when appropriate.    The patient was located at Home: [address] [place] [address] 9  [address] [place] [address]  Provider was located at [place] [address]): Manuel [address] [place] [address],  [facility] [address] [place]         --HENRY Rose - CNP

## 2023-03-01 DIAGNOSIS — J06.9 VIRAL URI: Primary | ICD-10-CM

## 2023-03-01 RX ORDER — DEXTROMETHORPHAN HYDROBROMIDE AND PROMETHAZINE HYDROCHLORIDE 15; 6.25 MG/5ML; MG/5ML
5 SYRUP ORAL 4 TIMES DAILY PRN
Qty: 200 ML | Refills: 0 | Status: SHIPPED | OUTPATIENT
Start: 2023-03-01 | End: 2023-03-08

## 2023-03-02 ENCOUNTER — TELEPHONE (OUTPATIENT)
Dept: ORTHOPEDIC SURGERY | Age: 43
End: 2023-03-02

## 2023-03-02 DIAGNOSIS — I10 ESSENTIAL HYPERTENSION: ICD-10-CM

## 2023-03-02 DIAGNOSIS — K21.00 GASTROESOPHAGEAL REFLUX DISEASE WITH ESOPHAGITIS WITHOUT HEMORRHAGE: ICD-10-CM

## 2023-03-02 DIAGNOSIS — R11.2 NON-INTRACTABLE VOMITING WITH NAUSEA: ICD-10-CM

## 2023-03-03 ENCOUNTER — TELEPHONE (OUTPATIENT)
Dept: FAMILY MEDICINE CLINIC | Age: 43
End: 2023-03-03

## 2023-03-03 RX ORDER — METOPROLOL SUCCINATE 50 MG/1
50 TABLET, EXTENDED RELEASE ORAL DAILY
Qty: 28 TABLET | Refills: 11 | Status: SHIPPED | OUTPATIENT
Start: 2023-03-03

## 2023-03-03 RX ORDER — PANTOPRAZOLE SODIUM 40 MG/1
40 TABLET, DELAYED RELEASE ORAL
Qty: 28 TABLET | Refills: 11 | Status: SHIPPED | OUTPATIENT
Start: 2023-03-03

## 2023-03-03 NOTE — TELEPHONE ENCOUNTER
----- Message from Irvin Abarca sent at 3/3/2023  9:42 AM EST -----  Subject: Message to Provider    QUESTIONS  Information for Provider? would like to know if Yamilet Boucher can write a letter   saying she can not work like how she did last year   ---------------------------------------------------------------------------  --------------  Musa Koenig [facility]  [phone]; OK to leave message on voicemail  ---------------------------------------------------------------------------  --------------  SCRIPT ANSWERS  Relationship to Patient?  Self

## 2023-03-06 RX ORDER — ERGOCALCIFEROL 1.25 MG/1
50000 CAPSULE ORAL WEEKLY
Qty: 4 CAPSULE | Refills: 1 | Status: SHIPPED | OUTPATIENT
Start: 2023-03-06

## 2023-03-06 NOTE — TELEPHONE ENCOUNTER
No.  You can just give her an updated version of the letter. I just saw her for her PreOp and still think that she cannot work due to the issues with her mental health and her physical limits due to her knees.

## 2023-03-06 NOTE — TELEPHONE ENCOUNTER
Letter completed, patient informed. She asked that 2 copies be left up front for her to . Printed and placed in accordion folder.

## 2023-03-08 ENCOUNTER — ANESTHESIA EVENT (OUTPATIENT)
Dept: OPERATING ROOM | Age: 43
End: 2023-03-08
Payer: COMMERCIAL

## 2023-03-08 NOTE — PROGRESS NOTES
1. Do not eat or drink anything after 12 midnight prior to surgery. This includes no water, chewing gum mints, or ice chips. You may brush your teeth and gargle the day of surgery but DO NOT SWALLOW THE WATER. 2. Please see your family doctor/pediatrician for a history and physical and/or concerning medications. Bring any test results/reports from your physician's office. If you are under the care of a heart doctor or specialist please be aware that you may be asked to see him or her for clearance. 3. You may be asked to stop blood thinners such as Coumadin, Plavix, Fragmin, and Lovenox or Anti-inflammatories such as Aspirin, Ibuprofen, Advil, and Naproxen prior to your surgery. Please check with your doctor before stopping these or any other medications. 4. Do not smoke, and do not drink any alcoholic beverages 24 hours prior to surgery. 5. You MUST make arrangements for a responsible adult to take you home after your surgery. For your safety, you will not be allowed to leave alone or drive yourself home. Your surgery will be cancelled if you do not have a ride home. Also for your safety, it is strongly suggested someone stay with you the first 24 hrs after your surgery. 6. A parent/legal guardian must accompany a child scheduled for surgery and plan to stay at the hospital until the child is discharged. Please do not bring other children with you. 7. For your comfort,please wear simple, loose fitting clothing to the hospital.  Please do not bring valuables (money, credit cards, checkbooks, etc.) Do not wear any makeup (including no eye makeup) or nail polish on your fingers or toes. 8. For your safety, please DO NOT wear any jewelry or piercings on day of surgery. All body piercing jewelry must be removed. 9. If you have dentures, they will be removed before going to the OR; for your convenience we will provide you with a container.   If you wear contact lenses or glasses, they will be removed, they will be removed, please bring a case for them. 10. If appicable,Please see your family doctor/pediatrician for a history & physical and/or concerning medications. Bring any test results/reports from your physician's office. 11. Remember to bring Blood Bank bracelet to the hospital on the day of surgery. 12. If you have a Living Will and Durable Power of  for Healthcare, please bring in a copy. 15. Notify your Surgeon if you develop any illness between now and surgery  time, cough, cold, fever, sore throat, nausea, vomiting, etc.  Please notify your surgeon if you experience dizziness, shortness of breath or blurred vision between now & the time of your surgery   14. DO NOT shave your operative site 96 hours prior to surgery. For face & neck surgery, men may use an electric razor 48 hours prior to surgery. 15. Shower the night before surgery with ___Antibacterial soap ___Hibiclens   16. To provide excellent care visitors will be limited to one in the room at any given time. 17.  Please bring picture ID and insurance card. 18.  Visit our web site for additional information:  [facility]/surgery.

## 2023-03-08 NOTE — PROGRESS NOTES
Preoperative Screening for Elective Surgery/Invasive Procedures While COVID-19 present in the community    Have you had any of the following symptoms? Fever, chills  Cough  Shortness of breath  Muscle aches/pain  Diarrhea  Abdominal pain, nausea, vomiting  Loss or decrease in taste and / or smell  Risk of Exposure  Have you recently been hospitalized for COVID-19 or flu-like illness, if so when? Recently diagnosed with COVID-19, if so when? Recently tested for COVID-19, if so when? Have you been in close contact with a person or family member who currently has or recently had COVID-23? If yes, when and in what context? Do you live with anybody who in the last 14 days has had fever, chills, shortness of breath, muscle aches, flu-like illness? Do you have any close contacts or family members who are currently in the hospital for COVID-19 or flu-like illness? If yes, assess recent close contact with this person. Indicate if the patient has a positive screen by answering yes to one or more of the above questions. Patients who test positive or screen positive prior to surgery or on the day of surgery should be evaluated in conjunction with the surgeon/proceduralist/anesthesiologist to determine the urgency of the procedure.     Pt states tested negative for covid x2 last week after sore throat; symptoms resolved

## 2023-03-15 ENCOUNTER — APPOINTMENT (OUTPATIENT)
Dept: GENERAL RADIOLOGY | Age: 43
End: 2023-03-15
Attending: STUDENT IN AN ORGANIZED HEALTH CARE EDUCATION/TRAINING PROGRAM
Payer: COMMERCIAL

## 2023-03-15 ENCOUNTER — HOSPITAL ENCOUNTER (OUTPATIENT)
Age: 43
Setting detail: OBSERVATION
Discharge: HOME HEALTH | End: 2023-03-18
Attending: STUDENT IN AN ORGANIZED HEALTH CARE EDUCATION/TRAINING PROGRAM | Admitting: STUDENT IN AN ORGANIZED HEALTH CARE EDUCATION/TRAINING PROGRAM
Payer: COMMERCIAL

## 2023-03-15 ENCOUNTER — ANESTHESIA (OUTPATIENT)
Dept: OPERATING ROOM | Age: 43
End: 2023-03-15
Payer: COMMERCIAL

## 2023-03-15 DIAGNOSIS — Z96.652 S/P TOTAL KNEE ARTHROPLASTY, LEFT: Primary | ICD-10-CM

## 2023-03-15 LAB
ABO + RH BLD: NORMAL
BLD GP AB SCN SERPL QL: NORMAL
GLUCOSE BLD-MCNC: 122 MG/DL (ref 70–99)
HCG UR QL: NEGATIVE
PERFORMED ON: ABNORMAL

## 2023-03-15 PROCEDURE — 3700000000 HC ANESTHESIA ATTENDED CARE: Performed by: STUDENT IN AN ORGANIZED HEALTH CARE EDUCATION/TRAINING PROGRAM

## 2023-03-15 PROCEDURE — 86850 RBC ANTIBODY SCREEN: CPT

## 2023-03-15 PROCEDURE — C1713 ANCHOR/SCREW BN/BN,TIS/BN: HCPCS | Performed by: STUDENT IN AN ORGANIZED HEALTH CARE EDUCATION/TRAINING PROGRAM

## 2023-03-15 PROCEDURE — 2720000010 HC SURG SUPPLY STERILE: Performed by: STUDENT IN AN ORGANIZED HEALTH CARE EDUCATION/TRAINING PROGRAM

## 2023-03-15 PROCEDURE — 64447 NJX AA&/STRD FEMORAL NRV IMG: CPT | Performed by: ANESTHESIOLOGY

## 2023-03-15 PROCEDURE — C1776 JOINT DEVICE (IMPLANTABLE): HCPCS | Performed by: STUDENT IN AN ORGANIZED HEALTH CARE EDUCATION/TRAINING PROGRAM

## 2023-03-15 PROCEDURE — 6360000002 HC RX W HCPCS: Performed by: STUDENT IN AN ORGANIZED HEALTH CARE EDUCATION/TRAINING PROGRAM

## 2023-03-15 PROCEDURE — 6370000000 HC RX 637 (ALT 250 FOR IP): Performed by: STUDENT IN AN ORGANIZED HEALTH CARE EDUCATION/TRAINING PROGRAM

## 2023-03-15 PROCEDURE — 86901 BLOOD TYPING SEROLOGIC RH(D): CPT

## 2023-03-15 PROCEDURE — 2580000003 HC RX 258: Performed by: STUDENT IN AN ORGANIZED HEALTH CARE EDUCATION/TRAINING PROGRAM

## 2023-03-15 PROCEDURE — 6360000002 HC RX W HCPCS: Performed by: ANESTHESIOLOGY

## 2023-03-15 PROCEDURE — 27447 TOTAL KNEE ARTHROPLASTY: CPT | Performed by: STUDENT IN AN ORGANIZED HEALTH CARE EDUCATION/TRAINING PROGRAM

## 2023-03-15 PROCEDURE — 2700000000 HC OXYGEN THERAPY PER DAY

## 2023-03-15 PROCEDURE — 6360000002 HC RX W HCPCS: Performed by: NURSE PRACTITIONER

## 2023-03-15 PROCEDURE — 73560 X-RAY EXAM OF KNEE 1 OR 2: CPT

## 2023-03-15 PROCEDURE — 2500000003 HC RX 250 WO HCPCS: Performed by: NURSE ANESTHETIST, CERTIFIED REGISTERED

## 2023-03-15 PROCEDURE — 2500000003 HC RX 250 WO HCPCS: Performed by: ANESTHESIOLOGY

## 2023-03-15 PROCEDURE — 2500000003 HC RX 250 WO HCPCS: Performed by: STUDENT IN AN ORGANIZED HEALTH CARE EDUCATION/TRAINING PROGRAM

## 2023-03-15 PROCEDURE — 94761 N-INVAS EAR/PLS OXIMETRY MLT: CPT

## 2023-03-15 PROCEDURE — 6370000000 HC RX 637 (ALT 250 FOR IP): Performed by: NURSE ANESTHETIST, CERTIFIED REGISTERED

## 2023-03-15 PROCEDURE — 3700000001 HC ADD 15 MINUTES (ANESTHESIA): Performed by: STUDENT IN AN ORGANIZED HEALTH CARE EDUCATION/TRAINING PROGRAM

## 2023-03-15 PROCEDURE — 6370000000 HC RX 637 (ALT 250 FOR IP): Performed by: ANESTHESIOLOGY

## 2023-03-15 PROCEDURE — G0378 HOSPITAL OBSERVATION PER HR: HCPCS

## 2023-03-15 PROCEDURE — 2709999900 HC NON-CHARGEABLE SUPPLY: Performed by: STUDENT IN AN ORGANIZED HEALTH CARE EDUCATION/TRAINING PROGRAM

## 2023-03-15 PROCEDURE — 86900 BLOOD TYPING SEROLOGIC ABO: CPT

## 2023-03-15 PROCEDURE — 3600000014 HC SURGERY LEVEL 4 ADDTL 15MIN: Performed by: STUDENT IN AN ORGANIZED HEALTH CARE EDUCATION/TRAINING PROGRAM

## 2023-03-15 PROCEDURE — 2580000003 HC RX 258: Performed by: ANESTHESIOLOGY

## 2023-03-15 PROCEDURE — A4217 STERILE WATER/SALINE, 500 ML: HCPCS | Performed by: STUDENT IN AN ORGANIZED HEALTH CARE EDUCATION/TRAINING PROGRAM

## 2023-03-15 PROCEDURE — 84703 CHORIONIC GONADOTROPIN ASSAY: CPT

## 2023-03-15 PROCEDURE — 94660 CPAP INITIATION&MGMT: CPT

## 2023-03-15 PROCEDURE — 3600000004 HC SURGERY LEVEL 4 BASE: Performed by: STUDENT IN AN ORGANIZED HEALTH CARE EDUCATION/TRAINING PROGRAM

## 2023-03-15 PROCEDURE — 6360000002 HC RX W HCPCS: Performed by: NURSE ANESTHETIST, CERTIFIED REGISTERED

## 2023-03-15 DEVICE — SCREW BNE L25MM DIA2.5MM KNEE FULL THRD HEX FEM PERSONA: Type: IMPLANTABLE DEVICE | Site: KNEE | Status: FUNCTIONAL

## 2023-03-15 DEVICE — SCREW BNE HD 3.5X48 MM HEX PERSONA: Type: IMPLANTABLE DEVICE | Site: KNEE | Status: FUNCTIONAL

## 2023-03-15 DEVICE — PSN TIB POR 2 PEG SZ E L: Type: IMPLANTABLE DEVICE | Site: KNEE | Status: FUNCTIONAL

## 2023-03-15 DEVICE — IMPLANTABLE DEVICE
Type: IMPLANTABLE DEVICE | Site: KNEE | Status: FUNCTIONAL
Brand: PERSONA® VIVACIT-E®

## 2023-03-15 DEVICE — PSN FEM CR POR CCR NRW SZ7 L: Type: IMPLANTABLE DEVICE | Site: KNEE | Status: FUNCTIONAL

## 2023-03-15 DEVICE — COMPONENT PAT DIA32MM THK10MM STD TI POLY TRABECULAR MTL: Type: IMPLANTABLE DEVICE | Site: KNEE | Status: FUNCTIONAL

## 2023-03-15 RX ORDER — BUSPIRONE HYDROCHLORIDE 5 MG/1
15 TABLET ORAL 2 TIMES DAILY
Status: DISCONTINUED | OUTPATIENT
Start: 2023-03-15 | End: 2023-03-18 | Stop reason: HOSPADM

## 2023-03-15 RX ORDER — PANTOPRAZOLE SODIUM 40 MG/1
40 TABLET, DELAYED RELEASE ORAL
Status: DISCONTINUED | OUTPATIENT
Start: 2023-03-16 | End: 2023-03-18 | Stop reason: HOSPADM

## 2023-03-15 RX ORDER — MORPHINE SULFATE 4 MG/ML
4 INJECTION, SOLUTION INTRAMUSCULAR; INTRAVENOUS
Status: DISCONTINUED | OUTPATIENT
Start: 2023-03-15 | End: 2023-03-16

## 2023-03-15 RX ORDER — ASPIRIN 81 MG/1
81 TABLET ORAL 2 TIMES DAILY
Status: DISCONTINUED | OUTPATIENT
Start: 2023-03-15 | End: 2023-03-18 | Stop reason: HOSPADM

## 2023-03-15 RX ORDER — ONDANSETRON 2 MG/ML
4 INJECTION INTRAMUSCULAR; INTRAVENOUS EVERY 6 HOURS PRN
Status: DISCONTINUED | OUTPATIENT
Start: 2023-03-15 | End: 2023-03-18 | Stop reason: HOSPADM

## 2023-03-15 RX ORDER — SENNA PLUS 8.6 MG/1
1 TABLET ORAL DAILY PRN
Status: DISCONTINUED | OUTPATIENT
Start: 2023-03-15 | End: 2023-03-18 | Stop reason: HOSPADM

## 2023-03-15 RX ORDER — SODIUM CHLORIDE 9 MG/ML
INJECTION, SOLUTION INTRAVENOUS PRN
Status: DISCONTINUED | OUTPATIENT
Start: 2023-03-15 | End: 2023-03-15 | Stop reason: HOSPADM

## 2023-03-15 RX ORDER — SODIUM CHLORIDE 0.9 % (FLUSH) 0.9 %
5-40 SYRINGE (ML) INJECTION PRN
Status: DISCONTINUED | OUTPATIENT
Start: 2023-03-15 | End: 2023-03-15 | Stop reason: HOSPADM

## 2023-03-15 RX ORDER — PROPOFOL 10 MG/ML
INJECTION, EMULSION INTRAVENOUS PRN
Status: DISCONTINUED | OUTPATIENT
Start: 2023-03-15 | End: 2023-03-15 | Stop reason: SDUPTHER

## 2023-03-15 RX ORDER — BUPIVACAINE HYDROCHLORIDE 5 MG/ML
INJECTION, SOLUTION EPIDURAL; INTRACAUDAL PRN
Status: DISCONTINUED | OUTPATIENT
Start: 2023-03-15 | End: 2023-03-15 | Stop reason: ALTCHOICE

## 2023-03-15 RX ORDER — ONDANSETRON 2 MG/ML
4 INJECTION INTRAMUSCULAR; INTRAVENOUS
Status: COMPLETED | OUTPATIENT
Start: 2023-03-15 | End: 2023-03-15

## 2023-03-15 RX ORDER — METOPROLOL SUCCINATE 50 MG/1
50 TABLET, EXTENDED RELEASE ORAL DAILY
Status: DISCONTINUED | OUTPATIENT
Start: 2023-03-15 | End: 2023-03-18 | Stop reason: HOSPADM

## 2023-03-15 RX ORDER — LAMOTRIGINE 100 MG/1
200 TABLET ORAL NIGHTLY
Status: DISCONTINUED | OUTPATIENT
Start: 2023-03-15 | End: 2023-03-18 | Stop reason: HOSPADM

## 2023-03-15 RX ORDER — ERGOCALCIFEROL 1.25 MG/1
50000 CAPSULE ORAL WEEKLY
Status: DISCONTINUED | OUTPATIENT
Start: 2023-03-15 | End: 2023-03-18 | Stop reason: HOSPADM

## 2023-03-15 RX ORDER — ALBUTEROL SULFATE 90 UG/1
AEROSOL, METERED RESPIRATORY (INHALATION) PRN
Status: DISCONTINUED | OUTPATIENT
Start: 2023-03-15 | End: 2023-03-15 | Stop reason: SDUPTHER

## 2023-03-15 RX ORDER — ACETAMINOPHEN 500 MG
1000 TABLET ORAL ONCE
Status: COMPLETED | OUTPATIENT
Start: 2023-03-15 | End: 2023-03-15

## 2023-03-15 RX ORDER — SCOLOPAMINE TRANSDERMAL SYSTEM 1 MG/1
1 PATCH, EXTENDED RELEASE TRANSDERMAL
Status: DISCONTINUED | OUTPATIENT
Start: 2023-03-15 | End: 2023-03-15

## 2023-03-15 RX ORDER — MORPHINE SULFATE 2 MG/ML
2 INJECTION, SOLUTION INTRAMUSCULAR; INTRAVENOUS
Status: DISCONTINUED | OUTPATIENT
Start: 2023-03-15 | End: 2023-03-16

## 2023-03-15 RX ORDER — ARIPIPRAZOLE 10 MG/1
10 TABLET ORAL DAILY
Status: DISCONTINUED | OUTPATIENT
Start: 2023-03-15 | End: 2023-03-18 | Stop reason: HOSPADM

## 2023-03-15 RX ORDER — OXYCODONE HYDROCHLORIDE 5 MG/1
10 TABLET ORAL EVERY 4 HOURS PRN
Status: DISCONTINUED | OUTPATIENT
Start: 2023-03-15 | End: 2023-03-18 | Stop reason: HOSPADM

## 2023-03-15 RX ORDER — MAGNESIUM SULFATE IN WATER 40 MG/ML
INJECTION, SOLUTION INTRAVENOUS
Status: COMPLETED
Start: 2023-03-15 | End: 2023-03-15

## 2023-03-15 RX ORDER — HYDROMORPHONE HCL 110MG/55ML
PATIENT CONTROLLED ANALGESIA SYRINGE INTRAVENOUS PRN
Status: DISCONTINUED | OUTPATIENT
Start: 2023-03-15 | End: 2023-03-15 | Stop reason: SDUPTHER

## 2023-03-15 RX ORDER — ACETAMINOPHEN 325 MG/1
650 TABLET ORAL EVERY 6 HOURS
Status: DISCONTINUED | OUTPATIENT
Start: 2023-03-15 | End: 2023-03-18 | Stop reason: HOSPADM

## 2023-03-15 RX ORDER — ONDANSETRON 2 MG/ML
INJECTION INTRAMUSCULAR; INTRAVENOUS PRN
Status: DISCONTINUED | OUTPATIENT
Start: 2023-03-15 | End: 2023-03-15 | Stop reason: SDUPTHER

## 2023-03-15 RX ORDER — TRANEXAMIC ACID 100 MG/ML
INJECTION, SOLUTION INTRAVENOUS PRN
Status: DISCONTINUED | OUTPATIENT
Start: 2023-03-15 | End: 2023-03-15 | Stop reason: SDUPTHER

## 2023-03-15 RX ORDER — DEXAMETHASONE SODIUM PHOSPHATE 10 MG/ML
INJECTION INTRAMUSCULAR; INTRAVENOUS PRN
Status: DISCONTINUED | OUTPATIENT
Start: 2023-03-15 | End: 2023-03-15 | Stop reason: SDUPTHER

## 2023-03-15 RX ORDER — SODIUM CHLORIDE 0.9 % (FLUSH) 0.9 %
5-40 SYRINGE (ML) INJECTION EVERY 12 HOURS SCHEDULED
Status: DISCONTINUED | OUTPATIENT
Start: 2023-03-15 | End: 2023-03-18 | Stop reason: HOSPADM

## 2023-03-15 RX ORDER — FENTANYL CITRATE 50 UG/ML
INJECTION, SOLUTION INTRAMUSCULAR; INTRAVENOUS PRN
Status: DISCONTINUED | OUTPATIENT
Start: 2023-03-15 | End: 2023-03-15 | Stop reason: SDUPTHER

## 2023-03-15 RX ORDER — SODIUM CHLORIDE 9 MG/ML
INJECTION, SOLUTION INTRAVENOUS PRN
Status: DISCONTINUED | OUTPATIENT
Start: 2023-03-15 | End: 2023-03-18 | Stop reason: HOSPADM

## 2023-03-15 RX ORDER — ROCURONIUM BROMIDE 10 MG/ML
INJECTION, SOLUTION INTRAVENOUS PRN
Status: DISCONTINUED | OUTPATIENT
Start: 2023-03-15 | End: 2023-03-15 | Stop reason: SDUPTHER

## 2023-03-15 RX ORDER — SODIUM CHLORIDE 0.9 % (FLUSH) 0.9 %
5-40 SYRINGE (ML) INJECTION PRN
Status: DISCONTINUED | OUTPATIENT
Start: 2023-03-15 | End: 2023-03-18 | Stop reason: HOSPADM

## 2023-03-15 RX ORDER — MAGNESIUM HYDROXIDE 1200 MG/15ML
LIQUID ORAL CONTINUOUS PRN
Status: COMPLETED | OUTPATIENT
Start: 2023-03-15 | End: 2023-03-15

## 2023-03-15 RX ORDER — SODIUM CHLORIDE, SODIUM LACTATE, POTASSIUM CHLORIDE, CALCIUM CHLORIDE 600; 310; 30; 20 MG/100ML; MG/100ML; MG/100ML; MG/100ML
INJECTION, SOLUTION INTRAVENOUS CONTINUOUS
Status: DISCONTINUED | OUTPATIENT
Start: 2023-03-15 | End: 2023-03-18 | Stop reason: HOSPADM

## 2023-03-15 RX ORDER — DEXAMETHASONE SODIUM PHOSPHATE 10 MG/ML
8 INJECTION INTRAMUSCULAR; INTRAVENOUS EVERY 12 HOURS
Status: DISCONTINUED | OUTPATIENT
Start: 2023-03-15 | End: 2023-03-18 | Stop reason: HOSPADM

## 2023-03-15 RX ORDER — SODIUM CHLORIDE 0.9 % (FLUSH) 0.9 %
5-40 SYRINGE (ML) INJECTION EVERY 12 HOURS SCHEDULED
Status: DISCONTINUED | OUTPATIENT
Start: 2023-03-15 | End: 2023-03-15 | Stop reason: HOSPADM

## 2023-03-15 RX ORDER — LIDOCAINE HYDROCHLORIDE 20 MG/ML
INJECTION, SOLUTION INFILTRATION; PERINEURAL PRN
Status: DISCONTINUED | OUTPATIENT
Start: 2023-03-15 | End: 2023-03-15 | Stop reason: SDUPTHER

## 2023-03-15 RX ORDER — KETAMINE HYDROCHLORIDE 50 MG/ML
INJECTION, SOLUTION, CONCENTRATE INTRAMUSCULAR; INTRAVENOUS PRN
Status: DISCONTINUED | OUTPATIENT
Start: 2023-03-15 | End: 2023-03-15 | Stop reason: SDUPTHER

## 2023-03-15 RX ORDER — MIDAZOLAM HYDROCHLORIDE 1 MG/ML
2 INJECTION INTRAMUSCULAR; INTRAVENOUS ONCE
Status: COMPLETED | OUTPATIENT
Start: 2023-03-15 | End: 2023-03-15

## 2023-03-15 RX ORDER — OXYCODONE HYDROCHLORIDE 5 MG/1
5 TABLET ORAL EVERY 4 HOURS PRN
Status: DISCONTINUED | OUTPATIENT
Start: 2023-03-15 | End: 2023-03-18 | Stop reason: HOSPADM

## 2023-03-15 RX ORDER — POLYETHYLENE GLYCOL 3350 17 G/17G
17 POWDER, FOR SOLUTION ORAL DAILY
Status: DISCONTINUED | OUTPATIENT
Start: 2023-03-15 | End: 2023-03-18 | Stop reason: HOSPADM

## 2023-03-15 RX ORDER — PRAZOSIN HYDROCHLORIDE 1 MG/1
2 CAPSULE ORAL NIGHTLY
Status: DISCONTINUED | OUTPATIENT
Start: 2023-03-15 | End: 2023-03-18 | Stop reason: HOSPADM

## 2023-03-15 RX ORDER — ESCITALOPRAM OXALATE 10 MG/1
20 TABLET ORAL DAILY
Status: DISCONTINUED | OUTPATIENT
Start: 2023-03-15 | End: 2023-03-18 | Stop reason: HOSPADM

## 2023-03-15 RX ORDER — SODIUM CHLORIDE, SODIUM LACTATE, POTASSIUM CHLORIDE, CALCIUM CHLORIDE 600; 310; 30; 20 MG/100ML; MG/100ML; MG/100ML; MG/100ML
INJECTION, SOLUTION INTRAVENOUS CONTINUOUS
Status: DISCONTINUED | OUTPATIENT
Start: 2023-03-15 | End: 2023-03-15 | Stop reason: HOSPADM

## 2023-03-15 RX ORDER — FAMOTIDINE 10 MG/ML
20 INJECTION, SOLUTION INTRAVENOUS ONCE
Status: COMPLETED | OUTPATIENT
Start: 2023-03-15 | End: 2023-03-15

## 2023-03-15 RX ORDER — BUPIVACAINE HYDROCHLORIDE 2.5 MG/ML
INJECTION, SOLUTION INFILTRATION; PERINEURAL
Status: COMPLETED | OUTPATIENT
Start: 2023-03-15 | End: 2023-03-15

## 2023-03-15 RX ORDER — MAGNESIUM SULFATE IN WATER 40 MG/ML
2000 INJECTION, SOLUTION INTRAVENOUS ONCE
Status: COMPLETED | OUTPATIENT
Start: 2023-03-15 | End: 2023-03-15

## 2023-03-15 RX ORDER — HYDROXYZINE PAMOATE 25 MG/1
25 CAPSULE ORAL 3 TIMES DAILY PRN
Status: DISCONTINUED | OUTPATIENT
Start: 2023-03-15 | End: 2023-03-18 | Stop reason: HOSPADM

## 2023-03-15 RX ORDER — CEFAZOLIN SODIUM IN 0.9 % NACL 2 G/100 ML
2000 PLASTIC BAG, INJECTION (ML) INTRAVENOUS ONCE
Status: DISCONTINUED | OUTPATIENT
Start: 2023-03-15 | End: 2023-03-15

## 2023-03-15 RX ORDER — BUPIVACAINE HYDROCHLORIDE AND EPINEPHRINE 2.5; 5 MG/ML; UG/ML
INJECTION, SOLUTION EPIDURAL; INFILTRATION; INTRACAUDAL; PERINEURAL
Status: COMPLETED | OUTPATIENT
Start: 2023-03-15 | End: 2023-03-15

## 2023-03-15 RX ADMIN — BUPIVACAINE HYDROCHLORIDE AND EPINEPHRINE BITARTRATE 20 ML: 2.5; .005 INJECTION, SOLUTION EPIDURAL; INFILTRATION; INTRACAUDAL; PERINEURAL at 12:00

## 2023-03-15 RX ADMIN — CEFAZOLIN 3000 MG: 10 INJECTION, POWDER, FOR SOLUTION INTRAVENOUS at 22:05

## 2023-03-15 RX ADMIN — Medication 1500 MG: at 16:04

## 2023-03-15 RX ADMIN — BUSPIRONE HYDROCHLORIDE 15 MG: 5 TABLET ORAL at 22:09

## 2023-03-15 RX ADMIN — FAMOTIDINE 20 MG: 10 INJECTION, SOLUTION INTRAVENOUS at 11:12

## 2023-03-15 RX ADMIN — HYDROMORPHONE HYDROCHLORIDE 0.5 MG: 0.5 INJECTION, SOLUTION INTRAMUSCULAR; INTRAVENOUS; SUBCUTANEOUS at 15:33

## 2023-03-15 RX ADMIN — SODIUM CHLORIDE, SODIUM LACTATE, POTASSIUM CHLORIDE, AND CALCIUM CHLORIDE: .6; .31; .03; .02 INJECTION, SOLUTION INTRAVENOUS at 13:05

## 2023-03-15 RX ADMIN — HYDROMORPHONE HYDROCHLORIDE 1 MG: 2 INJECTION, SOLUTION INTRAMUSCULAR; INTRAVENOUS; SUBCUTANEOUS at 13:24

## 2023-03-15 RX ADMIN — SODIUM CHLORIDE, POTASSIUM CHLORIDE, SODIUM LACTATE AND CALCIUM CHLORIDE: 600; 310; 30; 20 INJECTION, SOLUTION INTRAVENOUS at 22:04

## 2023-03-15 RX ADMIN — MAGNESIUM SULFATE IN WATER 2000 MG: 40 INJECTION, SOLUTION INTRAVENOUS at 13:11

## 2023-03-15 RX ADMIN — Medication 3000 MG: at 13:05

## 2023-03-15 RX ADMIN — ACETAMINOPHEN 1000 MG: 500 TABLET ORAL at 11:11

## 2023-03-15 RX ADMIN — PRAZOSIN HYDROCHLORIDE 2 MG: 1 CAPSULE ORAL at 22:08

## 2023-03-15 RX ADMIN — ONDANSETRON 4 MG: 2 INJECTION INTRAMUSCULAR; INTRAVENOUS at 16:09

## 2023-03-15 RX ADMIN — ONDANSETRON 4 MG: 2 INJECTION INTRAMUSCULAR; INTRAVENOUS at 22:17

## 2023-03-15 RX ADMIN — KETAMINE HYDROCHLORIDE 30 MG: 50 INJECTION INTRAMUSCULAR; INTRAVENOUS at 13:17

## 2023-03-15 RX ADMIN — Medication 10 ML: at 22:10

## 2023-03-15 RX ADMIN — FENTANYL CITRATE 100 MCG: 50 INJECTION INTRAMUSCULAR; INTRAVENOUS at 13:08

## 2023-03-15 RX ADMIN — MIDAZOLAM 2 MG: 1 INJECTION INTRAMUSCULAR; INTRAVENOUS at 12:01

## 2023-03-15 RX ADMIN — ROCURONIUM BROMIDE 50 MG: 10 SOLUTION INTRAVENOUS at 13:08

## 2023-03-15 RX ADMIN — LAMOTRIGINE 200 MG: 100 TABLET ORAL at 22:09

## 2023-03-15 RX ADMIN — ONDANSETRON 4 MG: 2 INJECTION INTRAMUSCULAR; INTRAVENOUS at 13:08

## 2023-03-15 RX ADMIN — ERGOCALCIFEROL 50000 UNITS: 1.25 CAPSULE ORAL at 22:08

## 2023-03-15 RX ADMIN — ARIPIPRAZOLE 10 MG: 10 TABLET ORAL at 22:09

## 2023-03-15 RX ADMIN — SUGAMMADEX 200 MG: 100 INJECTION, SOLUTION INTRAVENOUS at 15:01

## 2023-03-15 RX ADMIN — DEXAMETHASONE SODIUM PHOSPHATE 10 MG: 10 INJECTION INTRAMUSCULAR; INTRAVENOUS at 13:08

## 2023-03-15 RX ADMIN — OXYCODONE HYDROCHLORIDE 10 MG: 5 TABLET ORAL at 16:59

## 2023-03-15 RX ADMIN — PROPOFOL 200 MG: 10 INJECTION, EMULSION INTRAVENOUS at 13:08

## 2023-03-15 RX ADMIN — MORPHINE SULFATE 4 MG: 4 INJECTION, SOLUTION INTRAMUSCULAR; INTRAVENOUS at 22:18

## 2023-03-15 RX ADMIN — TRANEXAMIC ACID 1000 MG: 100 INJECTION, SOLUTION INTRAVENOUS at 13:18

## 2023-03-15 RX ADMIN — DEXAMETHASONE SODIUM PHOSPHATE 8 MG: 10 INJECTION INTRAMUSCULAR; INTRAVENOUS at 22:08

## 2023-03-15 RX ADMIN — LIDOCAINE HYDROCHLORIDE 80 MG: 20 INJECTION, SOLUTION INFILTRATION; PERINEURAL at 13:08

## 2023-03-15 RX ADMIN — BUPIVACAINE HYDROCHLORIDE 20 ML: 2.5 INJECTION, SOLUTION INFILTRATION; PERINEURAL at 11:50

## 2023-03-15 RX ADMIN — SODIUM CHLORIDE, SODIUM LACTATE, POTASSIUM CHLORIDE, AND CALCIUM CHLORIDE: .6; .31; .03; .02 INJECTION, SOLUTION INTRAVENOUS at 15:20

## 2023-03-15 RX ADMIN — Medication 4 PUFF: at 15:06

## 2023-03-15 RX ADMIN — HYDROMORPHONE HYDROCHLORIDE 0.5 MG: 0.5 INJECTION, SOLUTION INTRAMUSCULAR; INTRAVENOUS; SUBCUTANEOUS at 15:44

## 2023-03-15 ASSESSMENT — PAIN SCALES - GENERAL
PAINLEVEL_OUTOF10: 8
PAINLEVEL_OUTOF10: 8
PAINLEVEL_OUTOF10: 0
PAINLEVEL_OUTOF10: 8

## 2023-03-15 NOTE — ANESTHESIA PROCEDURE NOTES
Peripheral Block    Patient location during procedure: holding area  Reason for block: post-op pain management and at surgeon's request  Start time: 3/15/2023 11:50 AM  End time: 3/15/2023 11:56 AM  Staffing  Anesthesiologist: Jefry Conner MD  Preanesthetic Checklist  Completed: patient identified, IV checked, site marked, risks and benefits discussed, surgical/procedural consents and timeout performed  Peripheral Block   Patient position: right lateral decubitus  Prep: ChloraPrep  Patient monitoring: responsive to questions  Block type: iPacks  Laterality: left  Injection technique: single-shot  Guidance: ultrasound guided    Needle   Needle type: short-bevel   Needle gauge: 20 G  Needle localization: ultrasound guidance  Needle length: 10 cm  Assessment   Injection assessment: negative aspiration for heme, no paresthesia on injection, local visualized surrounding nerve on ultrasound and no intravascular symptoms  Paresthesia pain: none  Slow fractionated injection: yes  Hemodynamics: stable  Real-time US image taken/store: yes  Outcomes: patient tolerated procedure well    Medications Administered  bupivacaine 0.25 % - Perineural   20 mL - 3/15/2023 11:50:00 AM

## 2023-03-15 NOTE — ANESTHESIA PRE PROCEDURE
Department of Anesthesiology  Preprocedure Note       Name:  Floyd Andre   Age:  37 y.o.  :  1980                                          MRN:  [id number]         Date:  3/15/2023      Surgeon: Jennifer Kessler):  Aron Hoskins MD    Procedure: Procedure(s):  LEFT TOTAL KNEE ARTHROPLASTY           RONEL BIOMET NOTE: ADDUCTOR CANAL BLOCK, IPAC    Medications prior to admission:   Prior to Admission medications    Medication Sig Start Date End Date Taking? Authorizing Provider   vitamin D (ERGOCALCIFEROL) 1.25 MG (59361 UT) CAPS capsule Take 1 capsule by mouth once a week 3/6/23   HENRY Joel - CNP   pantoprazole (PROTONIX) 40 MG tablet Take 1 tablet by mouth every morning (before breakfast) 3/3/23   HENRY Joel - CNP   metoprolol succinate (TOPROL XL) 50 MG extended release tablet TAKE 1 TABLET BY MOUTH DAILY 3/3/23   HENRY Joel CNP   hydrOXYzine pamoate (VISTARIL) 25 MG capsule Take 1 capsule by mouth 3 times daily as needed 23   Historical Provider, MD   mupirocin (BACTROBAN NASAL) 2 % nasal ointment Take by Nasal route 2 times daily 5 days prior to surgery 23   HENRY Joel CNP   triamcinolone (KENALOG) 0.025 % cream Apply topically 2 times daily.   Patient not taking: Reported on 3/15/2023 1/27/23   HENRY Joel CNP   ARIPiprazole (ABILIFY) 10 MG tablet Take 10 mg by mouth daily    Historical Provider, MD   busPIRone (BUSPAR) 15 MG tablet Take 15 mg by mouth 2 in am; 1 qhs    Historical Provider, MD   lamoTRIgine (LAMICTAL) 200 MG tablet Take 200 mg by mouth nightly     Historical Provider, MD   prazosin (MINIPRESS) 2 MG capsule Take 2 mg by mouth nightly     Historical Provider, MD   escitalopram (LEXAPRO) 20 MG tablet TAKE ONE TABLET BY MOUTH DAILY 18   Jamel Tucker MD       Current medications:    Current Facility-Administered Medications   Medication Dose Route Frequency Provider Last Rate Last Admin    lactated ringers IV soln infusion   IntraVENous Continuous Sarah Sánchez MD        sodium chloride flush 0.9 % injection 5-40 mL  5-40 mL IntraVENous 2 times per day Sarah Sánchez MD        sodium chloride flush 0.9 % injection 5-40 mL  5-40 mL IntraVENous PRN Sarah Sánchez MD        0.9 % sodium chloride infusion   IntraVENous PRN Sarah Sánchez MD        magnesium sulfate 2 GM/50ML infusion                Allergies:     Allergies   Allergen Reactions    Sulfa Antibiotics     Amoxicillin Rash    Ultram [Tramadol] Nausea And Vomiting    Vicodin [Hydrocodone-Acetaminophen] Nausea And Vomiting       Problem List:    Patient Active Problem List   Diagnosis Code    [facility] G80.8    Bipolar disease, chronic ([facility]) F31.9    MDD (major depressive disorder), recurrent episode, moderate ([facility]) F33.1    PTSD (post-traumatic stress disorder) F43.10    Tear of medial cartilage or meniscus of knee, current [id number]    Unequal leg length (acquired) M21.70    Pain in joint, lower leg M25.569    Patellar subluxation S83.003A    Closed patellar dislocation S83.006A    Knee effusion M25.469    Left knee pain M25.562    Primary osteoarthritis of left knee M17.12    Hypertension I10    Trochanteric bursitis of right hip M70.61    Pain of right hip joint M25.551    Morbid obesity with BMI of 45.0-49.9, adult ([facility]) E66.01, Z68.42    Locked knee, left M23.92    Patellofemoral arthritis of left knee M17.12    Post-operative nausea and vomiting R11.2, Z98.890       Past Medical History:        Diagnosis Date    Bipolar disorder ([facility]     Cerebral palsy ([facility])     legs;mainly right    Depression     Hypertension     PONV (postoperative nausea and vomiting)        Past Surgical History:        Procedure Laterality Date    FOOT SURGERY      KNEE SURGERY Left 6/12/15    LEG SURGERY         Social History:    Social History     Tobacco Use    Smoking status: Never    Smokeless tobacco: Never   Substance Use Topics    Alcohol use: No     Alcohol/week: 0.0 standard drinks                                Counseling given: Not Answered      Vital Signs (Current):   Vitals:    03/08/23 1451 03/15/23 1104   BP:  (!) 147/92   Pulse:  (!) 110   Resp:  16   Temp:  (!) 96 °F (35.6 °C)   TempSrc:  Temporal   SpO2:  96%   Weight: 278 lb (126.1 kg)    Height: 5' 5\" (1.651 m) 5' 5\" (1.651 m)                                              BP Readings from Last 3 Encounters:   03/15/23 (!) 147/92   01/27/23 108/68   09/29/22 118/80       NPO Status: Time of last liquid consumption: 0000                        Time of last solid consumption: 0000                        Date of last liquid consumption: 03/15/23                        Date of last solid food consumption: 03/15/23    BMI:   Wt Readings from Last 3 Encounters:   03/08/23 278 lb (126.1 kg)   01/27/23 278 lb (126.1 kg)   01/10/23 287 lb (130.2 kg)     Body mass index is 46.26 kg/m².     CBC:   Lab Results   Component Value Date/Time    WBC 7.5 02/02/2023 05:02 PM    RBC 5.30 02/02/2023 05:02 PM    HGB 14.0 02/02/2023 05:02 PM    HCT 42.0 02/02/2023 05:02 PM    MCV 79.4 02/02/2023 05:02 PM    RDW 14.9 02/02/2023 05:02 PM     02/02/2023 05:02 PM       CMP:   Lab Results   Component Value Date/Time     02/02/2023 05:02 PM    K 3.6 02/02/2023 05:02 PM    K 3.7 07/16/2018 05:26 PM     02/02/2023 05:02 PM    CO2 26 02/02/2023 05:02 PM    BUN 10 02/02/2023 05:02 PM    CREATININE 1.0 02/02/2023 05:02 PM    GFRAA >60 09/29/2022 04:53 PM    GFRAA >60 11/13/2012 04:13 PM    AGRATIO 1.2 02/02/2023 05:02 PM    LABGLOM >60 02/02/2023 05:02 PM    GLUCOSE 101 02/02/2023 05:02 PM    PROT 7.6 02/02/2023 05:02 PM    PROT 8.0 11/13/2012 04:13 PM    CALCIUM 10.1 02/02/2023 05:02 PM    BILITOT <0.2 02/02/2023 05:02 PM    ALKPHOS 78 02/02/2023 05:02 PM    AST 11 02/02/2023 05:02 PM    ALT 12 02/02/2023 05:02 PM       POC Tests: No results for input(s): POCGLU, POCNA, POCK, POCCL, POCBUN, POCHEMO, POCHCT in the last 72 hours. Coags:   Lab Results   Component Value Date/Time    PROTIME 12.2 02/02/2023 05:02 PM    INR 0.92 02/02/2023 05:02 PM    APTT 25.2 02/02/2023 05:02 PM       HCG (If Applicable):   Lab Results   Component Value Date    PREGTESTUR Negative 03/15/2023        ABGs: No results found for: PHART, PO2ART, EZM3RSZ, NLO0ICJ, BEART, J8YIDGTK     Type & Screen (If Applicable):  No results found for: LABABO, LABRH    Drug/Infectious Status (If Applicable):  No results found for: HIV, HEPCAB    COVID-19 Screening (If Applicable): No results found for: COVID19        Anesthesia Evaluation    Airway: Mallampati: I  TM distance: >3 FB     Mouth opening: > = 3 FB   Dental: normal exam         Pulmonary: breath sounds clear to auscultation                             Cardiovascular:            Rhythm: regular  Rate: normal                    Neuro/Psych:                ROS comment: Cerebral palsy, PTSD GI/Hepatic/Renal:   (+) GERD:,           Endo/Other:                     Abdominal:   (+) obese,           Vascular: Other Findings:           Anesthesia Plan      general and regional     ASA 3     (Pt agrees to risks. Benefits and options of GETA. Questions answered. Willing to proceed.)  Induction: intravenous. Anesthetic plan and risks discussed with patient.                         Suzie Daley MD   3/15/2023

## 2023-03-15 NOTE — PROGRESS NOTES
Patient admitted to [facility] 9 in preparation for surgery, VSS. Consent confirmed. IV inserted into right hand, warm LR infusing. Belongings on cart to PACU. NPO since midnight. Sacral heart and warming blanket applied, IS education completed.

## 2023-03-15 NOTE — PROGRESS NOTES
03/15/23 1535   NIV Type   $NIV $Daily Charge   Skin Assessment Clean, dry, & intact   Skin Protection for O2 Device No   NIV Started/Stopped On   Equipment Type v60   Mode Bilevel   Mask Type Full face mask   Mask Size Medium   Settings/Measurements   PIP Observed 10 cm H20   IPAP 10 cmH20   CPAP/EPAP 5 cmH2O   Vt (Measured) 529 mL   Rate Ordered 10   Resp 16   Insp Rise Time (%) 3 %   FiO2  50 %   I Time/ I Time % 1 s   Minute Volume (L/min) 8.9 Liters   Mask Leak (lpm) 6 lpm   Comfort Level Good   Using Accessory Muscles No   SpO2 99   Patient's Home Machine No   Alarm Settings   Alarms On Y   High Pressure (cmH2O) 30 cmH2O   Apnea (secs) 20 secs   RR High (bpm) 30 br/min   Patient Observation   Observations in pacu

## 2023-03-15 NOTE — H&P
Update History & Physical    The patient's History and Physical of February 27, 2023 was reviewed with the patient and I examined the patient. There was no change. The surgical site was confirmed by the patient and me. Plan: The risks, benefits, expected outcome, and alternative to the recommended procedure have been discussed with the patient. Patient understands and wants to proceed with the procedure.      Electronically signed by Aquiles Dye MD on 8/44/9990 at 1:08 PM

## 2023-03-15 NOTE — ANESTHESIA POSTPROCEDURE EVALUATION
Department of Anesthesiology  Postprocedure Note    Patient: Blaire Hamm  MRN: [id number]  YOB: 1980  Date of evaluation: 3/15/2023      Procedure Summary     Date: 03/15/23 Room / Location: [address]    Anesthesia Start: 1305 Anesthesia Stop: 1520    Procedure: LEFT TOTAL KNEE ARTHROPLASTY           RONEL BIOMET NOTE: ADDUCTOR CANAL BLOCK, IPAC (Left: Knee) Diagnosis:       Primary osteoarthritis of left knee      (LEFT KNEE OSTEOARTHRITIS)    Surgeons: Gt Diaz MD Responsible Provider: Yehuda Cai MD    Anesthesia Type: general, regional ASA Status: 3          Anesthesia Type: No value filed.    Ankit Phase I: Ankit Score: 6    Ankit Phase II:        Anesthesia Post Evaluation    Patient location during evaluation: PACU  Level of consciousness: lethargic  Airway patency: patent  Nausea & Vomiting: no vomiting  Complications: no  Cardiovascular status: blood pressure returned to baseline  Respiratory status: acceptable  Hydration status: stable

## 2023-03-15 NOTE — PROGRESS NOTES
Patient to PACU from OR. Report received from [address]  and OR RN. Patient with eyes closed, alert to voice and stable on 6L. Patient denies pain when assessed. SCD's in place; ICE applied. VS obtained and filed. Will continue to monitor.

## 2023-03-15 NOTE — PROGRESS NOTES
Physical Therapy    Attempted to see pt for PT eval. Per discussion with Clementina RN, pt currently requiring BIPAP. Will hold PT this date. Will continue to follow as schedule allows and pt medically appropriate.     Tyler Mcleod, PT, DPT

## 2023-03-15 NOTE — DISCHARGE INSTRUCTIONS
*** Please contact [address] with any questions or concerns after your discharge. *** Mon- Fri 9am- 5pm [phone]. If you have any issues or concerns after 5pm or on the weekend please call your surgeon's office. I will be contacting you in a few days to follow up. If you need a pain medication refill please contact your surgeon's office. Please contact [facility] Nurse Navigator with any questions or concerns after your discharge. Mon- Fri [address] [phone]. If you have any issues or concerns after 5pm or on the weekend please call your surgeon's office. I will be contacting you in a few days to follow up. If you need a pain medication refill please contact your surgeon's office. Dr. Jitendra Donald Total Knee Replacement  Discharge Instructions      Activity: The first week after surgery is all about Ice, Elevation and Rest!        [address] way to elevate knee above heart, while keeping the knee straight. Pillows should be placed under the FOOT and leg, such that the leg is held straight. You should feel stretching in the back of the knee. If there is too much pain add pillows under the knee, but the leg should be as straight as possible  Placing pillows under the knee only and keeping the knee bent will make it very difficult to get it straight with physical therapy. Use a walker when ambulating. Physical therapy.    Typically starts 10-14 days after surgery unless otherwise instructed  Referral placed to outpatient location discussed with surgeon or for home PT if you do not have transportation for outpatient PT    To prevent Clot formation, you have been placed on the following medication:  ASA 81mg twice daily by mouth x 30 days    Surgical Site Care:  Keep dressing in place until follow up visit, Call the office if drainage  Showering is permitted with waterproof Mepilex dressing   Will remove dressing at first follow up appointment    Pain Medications  First and foremost you should take Tylenol and Celebrex as prescribed for baseline pain control  If you have medical reasons not to take either medicine they were not prescribed  You were also given Oxycodone as needed  This for is pain despite the other medications and is typically needed for the first 3-5 days after surgery  Be sure to drink plenty of fluids (recommend water) while taking narcotic pain medications to prevent constipation   You may take an over the counter laxative or stool softener as needed to prevent/treat constipation as well, we recommend Senokot S OTC. We recommend that you consider taking these medications the entire time you are taking pain medication. Cold packs/Ice packs/Machine  May be used as much as necessary to control swelling/inflammation/soreness. This is typically, approximately 20 minutes per hour    Contact [facility]'s office if  Increased redness, swelling, drainage of any kind as well as new onset fevers and or chills. These could signify an infection. Calf or thigh tenderness to touch as well as increased swelling or redness. This could signify a clot formation. Any rash appears, increased  or new onset nausea/vomiting occur. This may indicate a reaction to a medication. Phone # [phone]. [address] and Sports Medicine. Follow up with Surgeon at scheduled appointment time.

## 2023-03-15 NOTE — OP NOTE
Operative Note      Patient: Isabella Aguilar  YOB: 1980  MRN: [id number]    Date of Procedure: 3/15/2023    Pre-Op Diagnosis: LEFT KNEE OSTEOARTHRITIS    Post-Op Diagnosis: Same       Procedure(s):  LEFT TOTAL KNEE ARTHROPLASTY           RONEL BIOMET NOTE: ADDUCTOR CANAL BLOCK, IPAC    Surgeon(s):  Marcille Simmonds, MD    Assistant:   Surgical Assistant: Dk Dudley    Anesthesia: General    Estimated Blood Loss (mL): less than 50     Complications: None    Specimens:   * No specimens in log *    Implants:  Implant Name Type Inv. Item Serial No.  Lot No. LRB No. Used Action   PSN FEM CR POR CCR NRW SZ7 L - [id number]  PSN FEM CR POR CCR NRW SZ7 L  [facility] [id number] Left 1 Implanted   PSN TIB POR 2 PEG SZ E L - [id number]  PSN TIB POR 2 PEG SZ E L  [facility] [id number] Left 1 Implanted   PSN MC VE ASF L 14MM 6-7/EF - [id number]  PSN MC VE ASF L 14MM 6-7/EF  [facility]EL [facility] [id number] Left 1 Explanted   COMPONENT PAT [id number] STD TI POLY TRABECULAR MTL - [id number]  COMPONENT PAT [id number] STD TI POLY TRABECULAR MTL  [facility] [id number] Left 1 Implanted   PSN MC VE ASF L 16MM 6-7/EF - [id number]  PSN MC VE ASF L 16MM 6-7/EF  [facility]EL [facility] [id number] Left 1 Implanted         Drains: * No LDAs found *    Findings: lateral compartment OA and severe PF OA    Detailed Description of Procedure:   Clinical Indications: This is a 38 y/o F that was evaluated for L knee OA by myself, noted to have failed extensive conservative measures and desired to have surgical intervention. She had mostly severe PF OA but also lateral compartment OA and I did not think a PF replacement alone would be appropriate even with her young age. Risks benefits limitations and alternatives to L TKA were discussed with the patient who wished to proceed.      The patient was met in the preoperative holding area last-minute questions were answered, the L knee was marked, consent was verified. An adductor canal and IPAC block were administered per anesthesia. Ancef and TXA were administered. The patient was taken to the OR where general anesthesia was administered. She was positioned in the supine position. The operative extremity was prepped and draped in standard orthopedic fashion. Timeout was performed according hospital protocol. The operative extremity was exsanguinated and tourniquet inflated. Midline incision marked and made with scalpel. Dissection was carried down to the capsule with electrocautery. Median parapatellar arthrotomy was made with bovie. Medial release was performed. Once adequate exposure was achieved, attention was turned to the femur. The femoral canal was entered at the middle of the notch with the entry reamer. IM guide was inserted. In this case the neither worn distal femoral guide was utilized. Distal femoral resection was made, bone cuts were measured and I unfortunately over-resected medially by 4 mm giving us a 12 mm resection on the medial side and 8mm on the lateral side. I did not want to take more distal femur laterally and I thought at this point the most appropriate way to handle this situation was to employ mechanical alignment instead of the planned kinematic, such that we would take more bone posteromedially off the femur and less bone off the medial tibia to account for this extra medial distal femoral resection. Appropriate bone cuts were completed with the 4-1 block in 3deg ER for a size 7 femur. The posterior condylar resections were noted to be appropriately 10mm medial and 7 lateral prior to proceeding with the remainder of the cuts. Attention was then turned to the tibia. The tibial plateau was exposed and translated anteriorly.  Once adequate exposure was achieved, the proximal tibial guide was used to resect the tibia, referencing 10mm resection from the cartilage off the lateral tibia and replicating native tibial slope. We then proceeded with meniscus resection and posterior osteophyte resection both medially and laterally. Once this was achieved I assessed our flexion and extension gaps. Additional tibia / femoral resection was performed as necessary until gaps were balanced appropriately. Patella was resurfaced using a free hand technique, targeting the osteochondral margins medially and laterally. Trial components were then placed. Satisfactory ligamentous balance, range of motion and patellar tracking were confirmed. She was a bit tighter in flexion than extension with the excessive distal femoral resection but this balanced well at a 14mm poly with PCL release. There was a gap between the distal femur and implant medially, secondary to the over resection medially and I planned to fill this area with bone graft rater than change the alignment of an otherwise well balanced knee. The femoral drill pegs were drilled, tibia was prepared for the cementless pegs. The tourniquet was released and the tibia was impacted firmly follow by the 7 narrow femoral component. This was sitting off the bone aprox 2 mm with our excessive distal femoral resection medially, as it was when I trialed so I packed this interface with bone from previous bone cuts and then impacted the distal femur in place. The patella was then impacted in placed with the clamp impactor and noted to be seated flushly. Local anesthesia administered with 30 cc 0.5% bupivicaine with epi. Adequate range of motion, stability and patellar tracking again confirmed. This was optimal with a 16 mm MC poly. Hemostasis was ensured. Wound was again throughly irrigated, this time with the addition of dilute betadine. Final poly was inserted. Closure then ensued in standard fashion with #2 stratafix for the capsule, 2-0 stratafix for the subQ and 3-0 stratafix for the skin.  Perneo glue and Occlusive, compressive dressings were applied. The patient was then awoken from anesthesia and taken to PACU in stable condition. Post operatively the patient will have no activity or weight bearing restrictions. Pain control will be multimodal and minimize narcotics. They will take ASA 81mg BID x 1 month for DVT ppx. Plan to discharge home when pain controlled, voiding on own and mobilizing well with physical therapy. Patient will need home PT referral to start 3/27. Will likely transition to outpatient PT in [address] [place] [address] after home PT.       Electronically signed by Elizabeth Hirsch MD on 8/12/6677 at 2:58 PM

## 2023-03-15 NOTE — ANESTHESIA PROCEDURE NOTES
Peripheral Block    Patient location during procedure: holding area  Reason for block: post-op pain management and at surgeon's request  Start time: 3/15/2023 12:00 PM  End time: 3/15/2023 12:47 PM  Staffing  Anesthesiologist: Jason Roth MD  Preanesthetic Checklist  Completed: patient identified, IV checked, site marked, risks and benefits discussed, surgical/procedural consents, equipment checked, pre-op evaluation, timeout performed and anesthesia consent given  Peripheral Block   Patient position: supine  Block type: Femoral  Adductor canal  Laterality: left  Injection technique: single-shot  Guidance: ultrasound guided    Needle   Needle type: short-bevel   Needle gauge: 20 G  Needle localization: ultrasound guidance  Needle length: 10 cm  Assessment   Injection assessment: negative aspiration for heme, no paresthesia on injection, local visualized surrounding nerve on ultrasound and no intravascular symptoms  Paresthesia pain: none  Slow fractionated injection: yes  Hemodynamics: stable  Real-time US image taken/store: yes  Outcomes: patient tolerated procedure well    Medications Administered  bupivacaine-EPINEPHrine PF 0.25% -[id number] - Perineural   20 mL - 3/15/2023 12:00:00 PM

## 2023-03-15 NOTE — PROGRESS NOTES
Patient appears much more awake. Alert and orientated. Pain medication given per MAR. Called pharmacy to obtain vanc dose.  Tolerating ice chips

## 2023-03-16 LAB
ANION GAP SERPL CALCULATED.3IONS-SCNC: 12 MMOL/L (ref 3–16)
BASOPHILS # BLD: 0 K/UL (ref 0–0.2)
BASOPHILS NFR BLD: 0.3 %
BUN SERPL-MCNC: 8 MG/DL (ref 7–20)
CALCIUM SERPL-MCNC: 8.9 MG/DL (ref 8.3–10.6)
CHLORIDE SERPL-SCNC: 99 MMOL/L (ref 99–110)
CO2 SERPL-SCNC: 22 MMOL/L (ref 21–32)
CREAT SERPL-MCNC: 0.8 MG/DL (ref 0.6–1.1)
DEPRECATED RDW RBC AUTO: 14.9 % (ref 12.4–15.4)
EOSINOPHIL # BLD: 0 K/UL (ref 0–0.6)
EOSINOPHIL NFR BLD: 0 %
GFR SERPLBLD CREATININE-BSD FMLA CKD-EPI: >60 ML/MIN/{1.73_M2}
GLUCOSE SERPL-MCNC: 162 MG/DL (ref 70–99)
HCT VFR BLD AUTO: 41.8 % (ref 36–48)
HGB BLD-MCNC: 13.6 G/DL (ref 12–16)
LYMPHOCYTES # BLD: 0.6 K/UL (ref 1–5.1)
LYMPHOCYTES NFR BLD: 7 %
MCH RBC QN AUTO: 27.2 PG (ref 26–34)
MCHC RBC AUTO-ENTMCNC: 32.6 G/DL (ref 31–36)
MCV RBC AUTO: 83.5 FL (ref 80–100)
MONOCYTES # BLD: 0.4 K/UL (ref 0–1.3)
MONOCYTES NFR BLD: 4.6 %
NEUTROPHILS # BLD: 7.6 K/UL (ref 1.7–7.7)
NEUTROPHILS NFR BLD: 88.1 %
PLATELET # BLD AUTO: 194 K/UL (ref 135–450)
PMV BLD AUTO: 7.2 FL (ref 5–10.5)
POTASSIUM SERPL-SCNC: 4.6 MMOL/L (ref 3.5–5.1)
RBC # BLD AUTO: 5.01 M/UL (ref 4–5.2)
SODIUM SERPL-SCNC: 133 MMOL/L (ref 136–145)
WBC # BLD AUTO: 8.6 K/UL (ref 4–11)

## 2023-03-16 PROCEDURE — G0378 HOSPITAL OBSERVATION PER HR: HCPCS

## 2023-03-16 PROCEDURE — 6370000000 HC RX 637 (ALT 250 FOR IP): Performed by: SPECIALIST/TECHNOLOGIST

## 2023-03-16 PROCEDURE — 97116 GAIT TRAINING THERAPY: CPT

## 2023-03-16 PROCEDURE — 6360000002 HC RX W HCPCS: Performed by: STUDENT IN AN ORGANIZED HEALTH CARE EDUCATION/TRAINING PROGRAM

## 2023-03-16 PROCEDURE — 97162 PT EVAL MOD COMPLEX 30 MIN: CPT

## 2023-03-16 PROCEDURE — 85025 COMPLETE CBC W/AUTO DIFF WBC: CPT

## 2023-03-16 PROCEDURE — 97530 THERAPEUTIC ACTIVITIES: CPT

## 2023-03-16 PROCEDURE — 97110 THERAPEUTIC EXERCISES: CPT

## 2023-03-16 PROCEDURE — 99024 POSTOP FOLLOW-UP VISIT: CPT | Performed by: SPECIALIST/TECHNOLOGIST

## 2023-03-16 PROCEDURE — 97166 OT EVAL MOD COMPLEX 45 MIN: CPT

## 2023-03-16 PROCEDURE — 97535 SELF CARE MNGMENT TRAINING: CPT

## 2023-03-16 PROCEDURE — 6370000000 HC RX 637 (ALT 250 FOR IP): Performed by: STUDENT IN AN ORGANIZED HEALTH CARE EDUCATION/TRAINING PROGRAM

## 2023-03-16 PROCEDURE — 2700000000 HC OXYGEN THERAPY PER DAY

## 2023-03-16 PROCEDURE — 96374 THER/PROPH/DIAG INJ IV PUSH: CPT

## 2023-03-16 PROCEDURE — 94761 N-INVAS EAR/PLS OXIMETRY MLT: CPT

## 2023-03-16 PROCEDURE — 80048 BASIC METABOLIC PNL TOTAL CA: CPT

## 2023-03-16 PROCEDURE — 97161 PT EVAL LOW COMPLEX 20 MIN: CPT

## 2023-03-16 PROCEDURE — 6360000002 HC RX W HCPCS: Performed by: NURSE PRACTITIONER

## 2023-03-16 PROCEDURE — APPNB60 APP NON BILLABLE TIME 46-60 MINS: Performed by: SPECIALIST/TECHNOLOGIST

## 2023-03-16 PROCEDURE — 36415 COLL VENOUS BLD VENIPUNCTURE: CPT

## 2023-03-16 PROCEDURE — 2580000003 HC RX 258: Performed by: STUDENT IN AN ORGANIZED HEALTH CARE EDUCATION/TRAINING PROGRAM

## 2023-03-16 RX ORDER — ONDANSETRON 4 MG/1
4 TABLET, FILM COATED ORAL EVERY 8 HOURS PRN
Qty: 30 TABLET | Refills: 0 | Status: SHIPPED | OUTPATIENT
Start: 2023-03-16 | End: 2023-03-26

## 2023-03-16 RX ORDER — CELECOXIB 100 MG/1
100 CAPSULE ORAL 2 TIMES DAILY
Status: DISCONTINUED | OUTPATIENT
Start: 2023-03-16 | End: 2023-03-18 | Stop reason: HOSPADM

## 2023-03-16 RX ORDER — OXYCODONE HYDROCHLORIDE 5 MG/1
5 TABLET ORAL EVERY 6 HOURS PRN
Qty: 28 TABLET | Refills: 0 | Status: SHIPPED | OUTPATIENT
Start: 2023-03-16 | End: 2023-03-23

## 2023-03-16 RX ORDER — ONDANSETRON 4 MG/1
4 TABLET, FILM COATED ORAL ONCE
Status: DISCONTINUED | OUTPATIENT
Start: 2023-03-16 | End: 2023-03-16

## 2023-03-16 RX ORDER — POLYETHYLENE GLYCOL 3350 17 G/17G
17 POWDER, FOR SOLUTION ORAL DAILY
Qty: 527 G | Refills: 1 | Status: SHIPPED | OUTPATIENT
Start: 2023-03-17 | End: 2023-04-16

## 2023-03-16 RX ORDER — METHOCARBAMOL 500 MG/1
500 TABLET, FILM COATED ORAL 3 TIMES DAILY
Qty: 30 TABLET | Refills: 0 | Status: SHIPPED | OUTPATIENT
Start: 2023-03-16 | End: 2023-03-26

## 2023-03-16 RX ORDER — ASPIRIN 81 MG/1
81 TABLET ORAL 2 TIMES DAILY
Qty: 60 TABLET | Refills: 0 | Status: SHIPPED | OUTPATIENT
Start: 2023-03-16 | End: 2023-04-15

## 2023-03-16 RX ORDER — ACETAMINOPHEN 325 MG/1
650 TABLET ORAL EVERY 6 HOURS
Qty: 120 TABLET | Refills: 3 | Status: SHIPPED | OUTPATIENT
Start: 2023-03-16

## 2023-03-16 RX ORDER — METHOCARBAMOL 500 MG/1
500 TABLET, FILM COATED ORAL 3 TIMES DAILY
Status: DISCONTINUED | OUTPATIENT
Start: 2023-03-16 | End: 2023-03-18 | Stop reason: HOSPADM

## 2023-03-16 RX ORDER — ONDANSETRON 4 MG/1
4 TABLET, ORALLY DISINTEGRATING ORAL EVERY 8 HOURS PRN
Status: DISCONTINUED | OUTPATIENT
Start: 2023-03-16 | End: 2023-03-18 | Stop reason: HOSPADM

## 2023-03-16 RX ORDER — CELECOXIB 100 MG/1
100 CAPSULE ORAL 2 TIMES DAILY
Qty: 60 CAPSULE | Refills: 3 | Status: SHIPPED | OUTPATIENT
Start: 2023-03-16

## 2023-03-16 RX ADMIN — PRAZOSIN HYDROCHLORIDE 2 MG: 1 CAPSULE ORAL at 21:04

## 2023-03-16 RX ADMIN — OXYCODONE HYDROCHLORIDE 10 MG: 5 TABLET ORAL at 05:43

## 2023-03-16 RX ADMIN — BUSPIRONE HYDROCHLORIDE 15 MG: 5 TABLET ORAL at 09:05

## 2023-03-16 RX ADMIN — ACETAMINOPHEN 325MG 650 MG: 325 TABLET ORAL at 03:09

## 2023-03-16 RX ADMIN — ACETAMINOPHEN 325MG 650 MG: 325 TABLET ORAL at 21:04

## 2023-03-16 RX ADMIN — ACETAMINOPHEN 325MG 650 MG: 325 TABLET ORAL at 15:19

## 2023-03-16 RX ADMIN — OXYCODONE HYDROCHLORIDE 10 MG: 5 TABLET ORAL at 12:30

## 2023-03-16 RX ADMIN — ONDANSETRON 4 MG: 2 INJECTION INTRAMUSCULAR; INTRAVENOUS at 06:24

## 2023-03-16 RX ADMIN — POLYETHYLENE GLYCOL 3350 17 G: 17 POWDER, FOR SOLUTION ORAL at 09:06

## 2023-03-16 RX ADMIN — PANTOPRAZOLE SODIUM 40 MG: 40 TABLET, DELAYED RELEASE ORAL at 05:32

## 2023-03-16 RX ADMIN — BUSPIRONE HYDROCHLORIDE 15 MG: 5 TABLET ORAL at 21:04

## 2023-03-16 RX ADMIN — ASPIRIN 81 MG: 81 TABLET, COATED ORAL at 09:05

## 2023-03-16 RX ADMIN — MORPHINE SULFATE 4 MG: 4 INJECTION, SOLUTION INTRAMUSCULAR; INTRAVENOUS at 09:08

## 2023-03-16 RX ADMIN — ASPIRIN 81 MG: 81 TABLET, COATED ORAL at 21:04

## 2023-03-16 RX ADMIN — METOPROLOL SUCCINATE 50 MG: 50 TABLET, EXTENDED RELEASE ORAL at 09:05

## 2023-03-16 RX ADMIN — ACETAMINOPHEN 325MG 650 MG: 325 TABLET ORAL at 09:04

## 2023-03-16 RX ADMIN — METHOCARBAMOL 500 MG: 500 TABLET ORAL at 11:32

## 2023-03-16 RX ADMIN — ARIPIPRAZOLE 10 MG: 10 TABLET ORAL at 21:04

## 2023-03-16 RX ADMIN — CEFAZOLIN 3000 MG: 10 INJECTION, POWDER, FOR SOLUTION INTRAVENOUS at 05:33

## 2023-03-16 RX ADMIN — OXYCODONE HYDROCHLORIDE 5 MG: 5 TABLET ORAL at 17:47

## 2023-03-16 RX ADMIN — Medication 10 ML: at 09:05

## 2023-03-16 RX ADMIN — OXYCODONE HYDROCHLORIDE 10 MG: 5 TABLET ORAL at 00:44

## 2023-03-16 RX ADMIN — ESCITALOPRAM OXALATE 20 MG: 10 TABLET ORAL at 09:05

## 2023-03-16 RX ADMIN — METHOCARBAMOL 500 MG: 500 TABLET ORAL at 21:04

## 2023-03-16 RX ADMIN — DEXAMETHASONE SODIUM PHOSPHATE 8 MG: 10 INJECTION INTRAMUSCULAR; INTRAVENOUS at 21:05

## 2023-03-16 RX ADMIN — Medication 10 ML: at 21:05

## 2023-03-16 RX ADMIN — CELECOXIB 100 MG: 100 CAPSULE ORAL at 21:04

## 2023-03-16 RX ADMIN — DEXAMETHASONE SODIUM PHOSPHATE 8 MG: 10 INJECTION INTRAMUSCULAR; INTRAVENOUS at 09:04

## 2023-03-16 RX ADMIN — CELECOXIB 100 MG: 100 CAPSULE ORAL at 11:32

## 2023-03-16 RX ADMIN — LAMOTRIGINE 200 MG: 100 TABLET ORAL at 21:04

## 2023-03-16 ASSESSMENT — PAIN SCALES - GENERAL
PAINLEVEL_OUTOF10: 7
PAINLEVEL_OUTOF10: 6
PAINLEVEL_OUTOF10: 7
PAINLEVEL_OUTOF10: 7
PAINLEVEL_OUTOF10: 6
PAINLEVEL_OUTOF10: 8
PAINLEVEL_OUTOF10: 6
PAINLEVEL_OUTOF10: 8
PAINLEVEL_OUTOF10: 0
PAINLEVEL_OUTOF10: 7
PAINLEVEL_OUTOF10: 7

## 2023-03-16 ASSESSMENT — PAIN DESCRIPTION - DESCRIPTORS
DESCRIPTORS: ACHING

## 2023-03-16 ASSESSMENT — PAIN - FUNCTIONAL ASSESSMENT
PAIN_FUNCTIONAL_ASSESSMENT: ACTIVITIES ARE NOT PREVENTED
PAIN_FUNCTIONAL_ASSESSMENT: ACTIVITIES ARE NOT PREVENTED

## 2023-03-16 ASSESSMENT — PAIN DESCRIPTION - ORIENTATION
ORIENTATION: LEFT

## 2023-03-16 ASSESSMENT — PAIN DESCRIPTION - LOCATION
LOCATION: KNEE

## 2023-03-16 NOTE — PROGRESS NOTES
I reviewed the chart and discussed the case with nursing. There are no current medical needs and/or concerns at this time. If a need shall arise, please do not hesitate to contact us. Hospitalist group signing off. Thank you!   Jason Huerta CNP

## 2023-03-16 NOTE — PROGRESS NOTES
Physical Therapy  Facility/Department: [facility] C5 - MED SURG/ORTHO  Daily Treatment Note  NAME: Annie Becerril  : 1980  MRN: [id number]    Date of Service: 3/16/2023    Discharge Recommendations:  24 hour supervision or assist (if d/c home: pt/family wants immediate home PT for mobility training/home)   PT Equipment Recommendations  Equipment Needed: Yes  Mobility Devices: Layla Nap: Rolling  AMPAC 6 Clicks Inpatient Mobility:  AM-PAC Basic Mobility - Inpatient   How much help is needed turning from your back to your side while in a flat bed without using bedrails?: A Little  How much help is needed moving from lying on your back to sitting on the side of a flat bed without using bedrails?: A Little  How much help is needed moving to and from a bed to a chair?: A Little  How much help is needed standing up from a chair using your arms?: A Little  How much help is needed walking in hospital room?: A Little  How much help is needed climbing 3-5 steps with a railing?: A Lot  AM-PAC Inpatient Mobility Raw Score : 17  AM-PAC Inpatient T-Scale Score : 42.13  Mobility Inpatient CMS 0-100% Score: 50.57  Mobility Inpatient CMS G-Code Modifier : CK    Patient Diagnosis(es): The encounter diagnosis was S/P total knee arthroplasty, left. Assessment   Assessment: pt continues limited by post-surgical pain and fatigue, requiring CGA x 1 for bed>chair transfer with RW. Ambulation limited to 12' rw and pt preferred trial of a single stair step postponed till next session. PT will cont. to see pt in acute setting for LE ROM/strengthening and mobility/gait training prior to returning home. Recommend pt return home with 24-hr sup/assist from family (which mother can provide for the first few days), home PT, and RW for home use.   Pt and family wish to have home PT immediately post-D/C from hospital to assist with mobility training in pt's home environment and for home safety eval (will need to confirm this is okay per ortho surgery team). Activity Tolerance: Patient tolerated treatment well;Patient limited by fatigue;Patient limited by endurance  Equipment Needed: Yes  Mobility Devices: Agip U. 91. Therapy Plan  General Plan: 2 times a day 7 days a week  Specific Instructions for Next Treatment: Progress ther ex and mobility as tolerated, go over HEP and car transfer technique again at next session, curb step navigation prior to D/C home  Current Treatment Recommendations: Strengthening;ROM;Balance training;Functional mobility training;Transfer training;Gait training;Stair training;Home exercise program;Safety education & training;Patient/Caregiver education & training;Equipment evaluation, education, & procurement; Therapeutic activities     Restrictions  Restrictions/Precautions  Restrictions/Precautions: Weight Bearing, Surgical Protocols, General Precautions, Fall Risk  Required Braces or Orthoses?: No  Lower Extremity Weight Bearing Restrictions  Left Lower Extremity Weight Bearing: Weight Bearing As Tolerated  Position Activity Restriction  Other position/activity restrictions: Currently on 0.5L O2 post-operatively     Subjective    Subjective  Subjective: Pt agrees to PT session  Pain: 7/10 in LLE  Orientation  Overall Orientation Status: Within Normal Limits  Cognition  Overall Cognitive Status: WFL     Objective   Vitals  Heart Rate: 96  BP: (!) 141/84  BP Location: Right upper arm  MAP (Calculated): 103  SpO2: 96 % (.5L spo2)  O2 Device: Nasal cannula (.5)  Bed Mobility Training  Bed Mobility Training: Yes  Overall Level of Assistance: Contact-guard assistance; Adaptive equipment (Use of BR, HOB elevated, to R)  Interventions: Safety awareness training;Verbal cues; Visual cues; Tactile cues  Supine to Sit: Contact-guard assistance; Additional time  Sit to Supine: Contact-guard assistance; Additional time  Scooting: Contact-guard assistance  Balance  Sitting: Intact  Standing: Impaired (rw)  Standing - Static: Good  Standing - Dynamic: Fair  Transfer Training  Transfer Training: Yes (rw)  Interventions: Safety awareness training;Verbal cues; Visual cues; Tactile cues  Sit to Stand: Assist X1;Minimum assistance  Stand to Sit: Assist X1;Contact-guard assistance  Bed to Chair: Contact-guard assistance; Additional time (RW, moving toward R side)  Gait Training  Gait Training: Yes  Left Side Weight Bearing: As tolerated  Gait  Overall Level of Assistance: Contact-guard assistance  Interventions: Verbal cues; Safety awareness training  Base of Support: Shift to right  Speed/Molly: Pace decreased (< 100 feet/min); Shuffled; Slow  Step Length: Left shortened;Right shortened  Stance: Left decreased;Time;Weight shift  Gait Abnormalities: Decreased step clearance; Step to gait  Distance (ft): 16 Feet (3 ft)  Assistive Device: Walker, rolling;Gait belt     PT Exercises  Exercise Treatment: x 10-15 BLE: Ankle pumps, glut sets, quad sets. x 10 LLE: SAQ (pt indep but moves very slowly), SLR (with maxA), seated towel slides for gentle AROM into knee flexion (5 mins). Other Specialty Interventions  Other Treatments/Modalities: Time spent for pt/family discussion of pt's d/c plan in view of limited proper furniture (chair to sit in) and limited assistance for mobility/steps. Safety Devices  Type of Devices: All fall risk precautions in place;Call light within reach;Gait belt;Patient at risk for falls;Nurse notified; Left in chair;Chair alarm in place  Restraints  Restraints Initially in Place: No       Goals  Short Term Goals  Time Frame for Short Term Goals: 2 days, 3/18/23 (unless otherwise specified)  Short Term Goal 1: Pt will transfer supine <-> sit with supervision  -3/16 cga  Short Term Goal 2: Pt will transfer sit <-> stand and bed>chair using RW with supervision   -3/16 cga rw  Short Term Goal 3: Pt will ambulate x 100 feet using RW with supervision   -3/16 rw 16' cga  Short Term Goal 4: By 3/17/23: Pt will tolerate 12-15 reps appropriate LLE ther ex for ROM/strengthening   -3/16 initiated  Short Term Goal 5: Pt will ambulate up/down 6\" curb step using RW with CGA/SBA x 1   -3/16 NT  Patient Goals   Patient Goals : \"To be able to walk around my apartment (distances of 40 feet) using my walker without help from my family (SBA or less)\" by 3/17/23    Education  Patient Education  Education Given To: Patient; Family (mother)  Education Provided: Role of Therapy;Plan of Care;Home Exercise Program;Precautions;Transfer Training;Family Education;Equipment; Fall Prevention Strategies  Education Method: Demonstration;Verbal;Printed Information/Hand-outs  Barriers to Learning: Other (Comment)  Education Outcome: Verbalized understanding;Demonstrated understanding;Continued education needed    Therapy Time   Individual Concurrent Group Co-treatment   Time In 1342         Time Out 1436         Minutes 54         Timed Code Treatment Minutes: [address]

## 2023-03-16 NOTE — PROGRESS NOTES
Department of Orthopedic Surgery  Physician Assistant   Progress Note    Subjective:       Systemic or Specific Complaints:Pain Control and was only able to minimally work with therapy this morning, took a few steps from bed to chair. Pt voiding appropriately, tolerating PO, did have some n/v overnight, resolved with zofran. New oxygen demand.  Pt's mother at bedside    Objective:     Patient Vitals for the past 24 hrs:   BP Temp Temp src Pulse Resp SpO2 Height Weight   03/16/23 0900 -- 98.2 °F (36.8 °C) Oral (!) 104 18 -- -- --   03/16/23 0308 115/67 98.1 °F (36.7 °C) Oral 90 14 94 % -- --   03/16/23 0114 -- -- -- -- 16 -- -- --   03/15/23 2248 -- -- -- -- 14 -- -- --   03/15/23 2230 123/82 98.4 °F (36.9 °C) Oral 96 16 93 % -- --   03/15/23 2128 128/87 98.2 °F (36.8 °C) Oral 84 16 99 % -- --   03/15/23 2006 (!) 128/95 97.8 °F (36.6 °C) -- 87 -- 97 % -- --   03/15/23 1945 (!) 125/90 -- -- 82 15 96 % -- --   03/15/23 1935 127/80 -- -- 81 12 97 % -- --   03/15/23 1925 (!) 120/90 -- -- 85 15 95 % -- --   03/15/23 1920 122/73 -- -- 85 15 96 % -- --   03/15/23 1910 118/81 -- -- 79 13 95 % -- --   03/15/23 1714 133/80 -- -- 95 -- 94 % -- --   03/15/23 1710 (!) 124/91 -- -- (!) 101 12 96 % -- --   03/15/23 1658 (!) 135/99 -- -- 92 -- 99 % -- --   03/15/23 1610 (!) 147/95 -- -- 89 12 (!) 85 % -- --   03/15/23 1605 (!) 149/99 -- -- 95 15 91 % -- --   03/15/23 1600 (!) 144/70 -- -- 89 (!) 6 98 % -- --   03/15/23 1555 (!) 150/97 -- -- 87 (!) 6 99 % -- --   03/15/23 1540 (!) 151/92 -- -- 91 11 99 % -- --   03/15/23 1535 (!) 146/86 -- -- 93 16 99 % -- --   03/15/23 1523 (!) 129/92 -- -- 95 15 95 % -- --   03/15/23 1515 (!) 147/85 98 °F (36.7 °C) Skin 99 16 93 % -- --   03/15/23 1124 -- -- -- -- -- -- -- 273 lb 9.6 oz (124.1 kg)   03/15/23 1104 (!) 147/92 (!) 96 °F (35.6 °C) Temporal (!) 110 16 96 % 5' 5\" (1.651 m) --       General: alert, appears stated age, cooperative, and no distress   Wound: Wound clean and dry no evidence of infection. , No Erythema, No Drainage, and Positive for Edema   Motion: Painful range of Motion in affected extremity, seated in chair, knee to 80 degrees flexion   DVT Exam: No evidence of DVT seen on physical exam.  No cords or calf tenderness. No significant calf/ankle edema. Additional exam: Patient sitting up in chair at time of interview, left knee bent to 80 degrees flexion. Postop dressing applied  Dressing taken back for Mepilex inspection, clean, dry, intact  Left leg at neutral alignment  Swelling difficult to appreciate secondary to body habitus  Thigh and calf compartments soft and compressible, nontender to palpation  EHL, FHL, gastroc, anterior tib motor intact  Sensation intact to light touch  DP and PT pulses 2+      Data Review  CBC:   Lab Results   Component Value Date/Time    WBC 8.6 03/16/2023 05:50 AM    RBC 5.01 03/16/2023 05:50 AM    HGB 13.6 03/16/2023 05:50 AM    HCT 41.8 03/16/2023 05:50 AM     03/16/2023 05:50 AM       Renal:   Lab Results   Component Value Date/Time     03/16/2023 05:50 AM    K 4.6 03/16/2023 05:50 AM    CL 99 03/16/2023 05:50 AM    CO2 22 03/16/2023 05:50 AM    BUN 8 03/16/2023 05:50 AM    CREATININE 0.8 03/16/2023 05:50 AM    GLUCOSE 162 03/16/2023 05:50 AM    CALCIUM 8.9 03/16/2023 05:50 AM        EBL less than 50 mL    Assessment:      left total knee arthroplasty, postop day 1. Date of surgery 1/60/3797 by Dr. Yisesl Gaines:      1: Weightbearing as tolerated to the left lower extremity. Limit knee range of motion from 0 to 90 degrees for the first 2 weeks postop, gentle range of motion with therapy encouraged  2:  Continue Deep venous thrombosis prophylaxis-aspirin 81 mg twice daily by mouth for 1 month  3:  Continue Pain Control  4: PT/OT eval recommending 24 hour supervision or assist, Home with Home health PT, pt/family wants immediate home PT for mobility training/home safety eval, okay with ortho team, orders placed for [facility].  DME ordered- TTB, RTS, walker  Pt has tub/ shower unit and will require TTB to maximize safety and IND during shower transfers. Pt would also benefit from a raised toilet seat with arm to decrease risk of fall and maximize safety during STS from standard toilet height. Patient requires walker to complete ADLs that could not be done without a crutch or cane. 5: 2 doses IV Ancef completed postoperatively  6: Anticipate discharge tomorrow when mobilizing better with therapy, and pain is controlled.  DCP updated, scripts sent to [facility], [id number]

## 2023-03-16 NOTE — PROGRESS NOTES
Physical Therapy  Facility/Department: [place] [address] - MED SURG/ORTHO  Physical Therapy Initial Assessment    Name: Emmy Nair  : 1980  MRN: [id number]  Date of Service: 3/16/2023    Discharge Recommendations:  24 hour supervision or assist, Home with Home health PT (pt/family wants immediate home PT for mobility training/home safety eval - will need to okay with ortho team)   PT Equipment Recommendations  Equipment Needed: Yes  Mobility Devices: Unk Kirstie: Rolling      Patient Diagnosis(es): There were no encounter diagnoses. Past Medical History:  has a past medical history of Bipolar disorder ([facility] [address] Cerebral palsy ([facility] [address] Depression, Hypertension, and PONV (postoperative nausea and vomiting). Past Surgical History:  has a past surgical history that includes Foot surgery; Leg Surgery; knee surgery (Left, 6/12/15); and Total knee arthroplasty (Left, 3/15/2023). Assessment   Body Structures, Functions, Activity Limitations Requiring Skilled Therapeutic Intervention: Decreased functional mobility ; Decreased ROM; Decreased strength;Decreased endurance;Decreased balance; Increased pain  Assessment: Pt referred for PT evaluation during current hospital stay s/p L TKR surgery on 3/15/23. Pt cleared to begin working with PT by ortho team with orders for WBAT LLE. Pt with hx CP and residual RLE weakness at baseline. Today pt was limited by post-surgical pain and fatigue, requiring min/CGA x 1 for bed mobility and bed>chair transfer with RW. Pt able to take several small steps from bed>chair but was unable to ambulate 2* pain/fatigue. PT will cont. to see pt in acute setting for LE ROM/strengthening and mobility/gait training prior to returning home. Recommend pt return home with 24-hr sup/assist from family (which mother can provide for the first few days), home PT, and RW for home use.   Pt and family wish to have home PT immediately post-D/C from hospital to assist with mobility training in pt's home environment and for home safety eval (will need to confirm this is okay per ortho surgery team). Treatment Diagnosis: Decreased ROM/strength LLE and (I) with mobility s/p L TKR  Specific Instructions for Next Treatment: Progress ther ex and mobility as tolerated, go over HEP and car transfer technique again at next session, curb step navigation prior to D/C home  Therapy Prognosis: Good  Decision Making: Low Complexity  Requires PT Follow-Up: Yes  Activity Tolerance: Patient tolerated evaluation without incident;Patient tolerated treatment well;Patient limited by fatigue;Patient limited by pain     Plan   General Plan: 2 times a day 7 days a week  Specific Instructions for Next Treatment: Progress ther ex and mobility as tolerated, go over HEP and car transfer technique again at next session, curb step navigation prior to D/C home  Current Treatment Recommendations: Strengthening, ROM, Balance training, Functional mobility training, Transfer training, Gait training, Stair training, Home exercise program, Safety education & training, Patient/Caregiver education & training, Equipment evaluation, education, & procurement, Therapeutic activities  Safety Devices:  All fall risk precautions in place, Call light within reach, Chair alarm in place, Gait belt, Patient at risk for falls, Left in chair, Nurse notified     Restrictions  Restrictions/Precautions  Restrictions/Precautions: Weight Bearing, Surgical Protocols, General Precautions, Fall Risk  Required Braces or Orthoses?: No  Lower Extremity Weight Bearing Restrictions  Left Lower Extremity Weight Bearing: Weight Bearing As Tolerated  Position Activity Restriction  Other position/activity restrictions: Currently on 0.5L O2 post-operatively     Subjective   Chart Reviewed: Yes  Patient assessed for rehabilitation services?: Yes  Additional Pertinent Hx: Hx CP with RLE weakness  Family / Caregiver Present: Yes (mother)  Referring Practitioner: Dr. Brandon Maxwell  Referral Date : 03/15/23  Diagnosis: L knee OA, s/p L TKR on 3/15/23  Follows Commands: Within Functional Limits  General Comment: Pt resting in bed upon entry of therapy staff, had just been given pain medication by RN prior to PT's arrival  Subjective: Pt agreeable to work with PT this morning. States she's having a moderate/severe amount of pain in L knee. Rates surgical pain as 7/10 in L knee at rest after having been given IV morphine by RN. Social/Functional History  Social/Functional History  Lives With: Son (2 sons (15 y/o and 13 y/o))  Type of Home: Apartment  Home Layout: One level  Home Access: Stairs to enter without rails, Level entry  Entrance Stairs - Number of Steps: 1 RUDY without handrails through most accessible entrance, level entry through back (although this entrance is not as accessible and isn't used often by pt)  Bathroom Shower/Tub: Tub/Shower unit, Curtain  Bathroom Toilet: Standard  Bathroom Equipment: Grab bars in shower (grab bar just outside of shower)  Home Equipment:  (no home DME)  Has the patient had two or more falls in the past year or any fall with injury in the past year?: Yes (2 falls (due to LOB), pt struggles to get up on her own after falls)  ADL Assistance: [address] [place] [address] Avenue: Independent  Ambulation Assistance: Independent (without AD)  Transfer Assistance: Independent  Active : No  Patient's  Info:  Mother  Mode of Transportation: Family  Occupation: Other(comment)  Type of Occupation: Pt does not work outside the home, raises her 2 sons  Leisure & Hobbies: Watching TV, spending time with family  Additional Comments: Pt's mother planning on staying with pt for the first few days and can provide 24-hr sup/assist.    Vision/Hearing  Vision  Vision: Impaired  Vision Exceptions: Wears glasses at all times  Hearing  Hearing: Within functional limits      Cognition   Orientation  Overall Orientation Status: Within Normal Limits     Objective Vitals  Heart Rate: (!) 122 (after bed>chair transfer)  Heart Rate Source: Monitor  BP: 118/78 (after bed>chair transfer)  BP Location: Right upper arm  BP Method: Automatic  Patient Position: Up in chair;Sitting  MAP (Calculated): 91  Resp: 18  SpO2: 95 %  O2 Device: None (Room air) - placed 0.5L O2 back on pt after PT session. Observation/Palpation  Posture: Fair    Gross Assessment  AROM: Generally decreased, functional  Strength: Generally decreased, functional (decreased in RLE at baseline 2* weakness from CP at birth, decreased in LLE due to recent TKR sx)  Coordination: Generally decreased, functional  Tone: Normal     Bed Mobility Training  Bed Mobility Training: Yes  Interventions: Safety awareness training;Verbal cues; Visual cues; Tactile cues  Supine to Sit: Minimum assistance; Additional time (to scoot LLE OOB, HOB elevated ~60 degrees, pt moving toward R side)  Scooting: Contact-guard assistance; Additional time (to scoot forward to EOB)    Balance  Sitting: Intact  Standing: Impaired  Standing - Static: Fair;Occasional  Standing - Dynamic: Fair;Occasional    Transfer Training  Interventions: Safety awareness training;Verbal cues; Visual cues; Tactile cues  Sit to Stand: Minimum assistance; Additional time  Stand to Sit: Contact-guard assistance; Additional time  Bed to Chair: Contact-guard assistance; Additional time (using RW, moving toward R side)    Gait Training  Left Side Weight Bearing: As tolerated  Overall Level of Assistance: Minimum assistance;Contact-guard assistance  Base of Support: Shift to right  Speed/Molly: Pace decreased (< 100 feet/min); Shuffled; Slow  Step Length: Left shortened;Right shortened  Stance: Left decreased;Time;Weight shift  Gait Abnormalities: Decreased step clearance; Step to gait (very slow pace, difficulty advancing BLE during swing-through due to LLE weakness following TKR sx and baseline RLE weakness 2* CP)  Distance (ft): 3 Feet (x 3 feet from bed>chair, pt unable to amb further this session 2* fatigue/pain)  Assistive Device: Walker, rolling;Gait belt    Exercise Treatment: x 10 BLE: Ankle pumps, glut sets, quad sets. x 10 LLE: SAQ (pt indep but moves very slowly), SLR (with maxA), seated towel slides for gentle AROM into knee flexion. AM-PAC Score  AM-PAC Inpatient Mobility Raw Score : 17 (03/16/23 1023)  AM-PAC Inpatient T-Scale Score : 42.13 (03/16/23 1023)  Mobility Inpatient CMS 0-100% Score: 50.57 (03/16/23 1023)  Mobility Inpatient CMS G-Code Modifier : CK (03/16/23 1023)    Goals  Short Term Goals  Time Frame for Short Term Goals: 2 days, 3/18/23 (unless otherwise specified)  Short Term Goal 1: Pt will transfer supine <-> sit with supervision  Short Term Goal 2: Pt will transfer sit <-> stand and bed>chair using RW with supervision  Short Term Goal 3: Pt will ambulate x 100 feet using RW with supervision  Short Term Goal 4: By 3/17/23: Pt will tolerate 12-15 reps appropriate LLE ther ex for ROM/strengthening  Short Term Goal 5: Pt will ambulate up/down 6\" curb step using RW with CGA/SBA x 1  Patient Goals   Patient Goals : \"To be able to walk around my apartment (distances of 40 feet) using my walker without help from my family (SBA or less)\" by 3/17/23       Education  Patient Education  Education Given To: Patient; Family (pt and mother)  Education Provided: Role of Therapy;Plan of Care;Home Exercise Program;Precautions;Transfer Training;Family Education;Equipment; Fall Prevention Strategies  Education Method: Demonstration;Verbal;Printed Information/Hand-outs (HEP, car transfer handout)  Barriers to Learning: Other (Comment) (fatigue/lethargy at times from anesthesia/pain medication)  Education Outcome: Verbalized understanding;Demonstrated understanding;Continued education needed    Patient Educated in safety with car transfers and  dispensed instruction on car transfers with use of assistive device with patient demonstrating:  [] Verbalizing understanding of appropriate technique, maintaining any ordered precautions for car transfer training s/p TJR  [] [place] with simulated car transfer with walker  [x] He/she requires min/CGA assist x 1 and will have someone at discharge (mother) to help with transfers with family verbalizing understanding of any ordered TJR precautions employing appropriate technique. [x] Other: Educated patient in written car transfer instruction with family (pt's mother) verbalizing understanding of appropriate techniques (pt was too lethargic for car transfer education this session). Therapy Time   Individual Concurrent Group Co-treatment   Time In 0910         Time Out 1010         Minutes 60         Timed Code Treatment Minutes: [facility] [place], [place] #[id number]    If pt is unable to be seen after this session, please let this note serve as discharge summary. Please see case management note for discharge disposition. Thank you.

## 2023-03-16 NOTE — PROGRESS NOTES
Department of Orthopedic Surgery  Progress Note      SUBJECTIVE  36 y/o F POD # 1 L TKA    OBJECTIVE    Physical      Dressing c/d/I. NVID.        VITALS:  BP (!) 141/84   Pulse 96   Temp 98.2 °F (36.8 °C) (Oral)   Resp 18   Ht 5' 5\" (1.651 m)   Wt 273 lb 9.6 oz (124.1 kg)   SpO2 96% Comment: .5L spo2  BMI 45.53 kg/m²   24HR INTAKE/OUTPUT:      Intake/Output Summary (Last 24 hours) at 3/16/2023 1528  Last data filed at 3/16/2023 0634  Gross per 24 hour   Intake --   Output 450 ml   Net -450 ml     URINARY CATHETER OUTPUT (Beal):  External Urinary Catheter-Output (mL): 450 mL      Data    CBC:   Lab Results   Component Value Date/Time    WBC 8.6 03/16/2023 05:50 AM    RBC 5.01 03/16/2023 05:50 AM    HGB 13.6 03/16/2023 05:50 AM    HCT 41.8 03/16/2023 05:50 AM    MCV 83.5 03/16/2023 05:50 AM    MCH 27.2 03/16/2023 05:50 AM    MCHC 32.6 03/16/2023 05:50 AM    RDW 14.9 03/16/2023 05:50 AM     03/16/2023 05:50 AM    MPV 7.2 03/16/2023 05:50 AM     BMP:    Lab Results   Component Value Date/Time     03/16/2023 05:50 AM    K 4.6 03/16/2023 05:50 AM    CL 99 03/16/2023 05:50 AM    CO2 22 03/16/2023 05:50 AM    BUN 8 03/16/2023 05:50 AM    LABALBU 4.1 02/02/2023 05:02 PM    CREATININE 0.8 03/16/2023 05:50 AM    CALCIUM 8.9 03/16/2023 05:50 AM    GFRAA >60 09/29/2022 04:53 PM    GFRAA >60 11/13/2012 04:13 PM    LABGLOM >60 03/16/2023 05:50 AM    GLUCOSE 162 03/16/2023 05:50 AM     Current Inpatient Medications    Current Facility-Administered Medications: methocarbamol (ROBAXIN) tablet 500 mg, 500 mg, Oral, TID  celecoxib (CELEBREX) capsule 100 mg, 100 mg, Oral, BID  ondansetron (ZOFRAN-ODT) disintegrating tablet 4 mg, 4 mg, Oral, Q8H PRN  escitalopram (LEXAPRO) tablet 20 mg, 20 mg, Oral, Daily  prazosin (MINIPRESS) capsule 2 mg, 2 mg, Oral, Nightly  lamoTRIgine (LAMICTAL) tablet 200 mg, 200 mg, Oral, Nightly  ARIPiprazole (ABILIFY) tablet 10 mg, 10 mg, Oral, Daily  busPIRone (BUSPAR) tablet 15 mg, 15 mg, Oral, BID  hydrOXYzine pamoate (VISTARIL) capsule 25 mg, 25 mg, Oral, TID PRN  pantoprazole (PROTONIX) tablet 40 mg, 40 mg, Oral, QAM AC  metoprolol succinate (TOPROL XL) extended release tablet 50 mg, 50 mg, Oral, Daily  vitamin D (ERGOCALCIFEROL) capsule 50,000 Units, 50,000 Units, Oral, Weekly  lactated ringers IV soln infusion, , IntraVENous, Continuous  sodium chloride flush 0.9 % injection 5-40 mL, 5-40 mL, IntraVENous, 2 times per day  sodium chloride flush 0.9 % injection 5-40 mL, 5-40 mL, IntraVENous, PRN  0.9 % sodium chloride infusion, , IntraVENous, PRN  acetaminophen (TYLENOL) tablet 650 mg, 650 mg, Oral, Q6H  oxyCODONE (ROXICODONE) immediate release tablet 5 mg, 5 mg, Oral, Q4H PRN **OR** oxyCODONE (ROXICODONE) immediate release tablet 10 mg, 10 mg, Oral, Q4H PRN  polyethylene glycol (GLYCOLAX) packet 17 g, 17 g, Oral, Daily  senna (SENOKOT) tablet 8.6 mg, 1 tablet, Oral, Daily PRN  aspirin EC tablet 81 mg, 81 mg, Oral, BID  dexamethasone (DECADRON) injection 8 mg, 8 mg, IntraVENous, Q12H  ondansetron (ZOFRAN) injection 4 mg, 4 mg, IntraVENous, Q6H PRN    ASSESSMENT AND PLAN      Patient not safe for discharge today. Issues getting up from chair safely today and still on oxygen. Will revaluate tomorrow - d/c home vs rehab tomorrow. Continue current post op management otherwise. ASA for DVT ppx. PT. Pain control. Decadron.

## 2023-03-16 NOTE — CARE COORDINATION
[facility]    Referral received from  to follow for home care services. I will follow for needs, and speak with patient to verify demos.     Saturday start of care planned    [facility] RN, BSN CTN  [facility] [phone]

## 2023-03-16 NOTE — PROGRESS NOTES
Occupational Therapy  Facility/Department: [place] [address] - MED SURG/ORTHO  Occupational Therapy Initial Assessment/ Treatment    Name: Zina Goltz  : 1980  MRN: [id number]  Date of Service: 3/16/2023    Discharge Recommendations:  Continue to assess pending progress, 24 hour supervision or assist, Home with Home health OT  OT Equipment Recommendations  Equipment Needed: Yes  Mobility Devices: ADL Assistive Devices  ADL Assistive Devices: Transfer Tub Bench; Toileting - Raised Toilet Seat without arms (Pt has tub/ shower unit and will require TTB to maximize safety and IND during shower transfers. Pt would also benefit from a raised toilet seat with arm to decrease risk of fall and maximize safety during STS from standard toilet height.)       Patient Diagnosis(es): The encounter diagnosis was S/P total knee arthroplasty, left. Past Medical History:  has a past medical history of Bipolar disorder ([facility] [address] Cerebral palsy ([facility] [address] Depression, Hypertension, and PONV (postoperative nausea and vomiting). Past Surgical History:  has a past surgical history that includes Foot surgery; Leg Surgery; knee surgery (Left, 6/12/15); and Total knee arthroplasty (Left, 3/15/2023). Treatment Diagnosis: impaired ADLs      Assessment   Performance deficits / Impairments: Decreased functional mobility ; Decreased endurance;Decreased strength;Decreased ADL status; Decreased safe awareness;Decreased balance;Decreased high-level IADLs  Assessment: Pt is a 36 yo female admitted to [facility] s/p L TKA. Pt is WBAT, no restrictions. Pt reports PTA living in a 1 story home with 1 RUDY and IND with I/ADLs and mobility without AD. Pt currently requires CGA for bed mobility and CGA-min A for STS with RW. Pt completed LB dressing with mod A  and seated grooming tasks with SBA/supv. Pt most limited by pain and fatigue, unable to assist BSC/toilet transfer during evaluation. Pt's mother present and reporting able to provide 24H supv/assist at discharge. Anticipate pt able to return home with 24H supv/assist+ HHOT once medically stable. Treatment Diagnosis: impaired ADLs  Prognosis: Fair  Decision Making: Medium Complexity  REQUIRES OT FOLLOW-UP: Yes  Activity Tolerance  Activity Tolerance: Patient limited by pain; Patient limited by fatigue  Activity Tolerance Comments: 7/10 pain. Pt reporting RN provided pain meds prior to therapy staff entry.  Ice packs applied to LLE with patient satisfied        Plan   Occupational Therapy Plan  Times Per Week: 4-6x  Current Treatment Recommendations: Strengthening, Balance training, Functional mobility training, Endurance training, Gait training, Pain management, Safety education & training, Equipment evaluation, education, & procurement, Patient/Caregiver education & training, Self-Care / ADL     Restrictions  Restrictions/Precautions  Restrictions/Precautions: Weight Bearing, Surgical Protocols, General Precautions, Fall Risk  Required Braces or Orthoses?: No  Lower Extremity Weight Bearing Restrictions  Left Lower Extremity Weight Bearing: Weight Bearing As Tolerated  Position Activity Restriction  Other position/activity restrictions: Currently on 0.5L O2 post-operatively    Subjective   General  Patient assessed for rehabilitation services?: Yes     Social/Functional History  Social/Functional History  Lives With: Son (2 sons (15 y/o and 11 y/o))  Type of Home: Apartment  Home Layout: One level  Home Access: Stairs to enter without rails, Level entry  Entrance Stairs - Number of Steps: 1 RUDY without handrails through most accessible entrance, level entry through back (although this entrance is not as accessible and isn't used often by pt)  Bathroom Shower/Tub: Tub/Shower unit, Curtain  Bathroom Toilet: Standard  Bathroom Equipment: Grab bars in shower (grab bar just outside of shower)  Home Equipment:  (no home DME)  Has the patient had two or more falls in the past year or any fall with injury in the past year?: Yes (2 falls (due to LOB), pt struggles to get up on her own after falls)  ADL Assistance: [address] [facility] [address]: Independent  Ambulation Assistance: Independent (without AD)  Transfer Assistance: Independent  Active : No  Patient's  Info: Mother  Mode of Transportation: Family  Occupation: Other(comment)  Type of Occupation: Pt does not work outside the home, raises her 2 sons  Leisure & Hobbies: Watching TV, spending time with family  Additional Comments: Pt's mother planning on staying with pt for the first few days and can provide 24-hr sup/assist.       Objective   Heart Rate: (!) 122 (after bed>chair transfer)  Heart Rate Source: Monitor  BP: 118/78 (after bed>chair transfer)  BP Location: Right upper arm  BP Method: Automatic  Patient Position: Up in chair;Sitting  MAP (Calculated): 91  Resp: 18  SpO2: 95 %  O2 Device: None (Room air)          Observation/Palpation  Posture: Fair  Safety Devices  Type of Devices: All fall risk precautions in place;Call light within reach;Gait belt;Patient at risk for falls;Nurse notified; Bed alarm in place; Left in bed  Restraints  Restraints Initially in Place: No  Bed Mobility Training  Bed Mobility Training: Yes  Overall Level of Assistance: Contact-guard assistance; Adaptive equipment (Use of BR, HOB elevated, to R)  Interventions: Safety awareness training;Verbal cues; Visual cues; Tactile cues  Supine to Sit: Contact-guard assistance; Additional time  Sit to Supine: Contact-guard assistance; Additional time  Scooting: Contact-guard assistance  Balance  Sitting: Intact  Standing: Impaired (CGA for static standing, min A for dynamic stance during clothing management)  Standing - Static: Fair;Occasional  Standing - Dynamic: Fair;Constant support  Transfer Training  Transfer Training: Yes  Interventions: Safety awareness training;Verbal cues; Visual cues; Tactile cues  Sit to Stand: Minimum assistance; Additional time; Adaptive equipment;Assist X1 (w/ RW)  Stand to Sit: Contact-guard assistance; Additional time; Adaptive equipment;Assist X1 (w/ RW)    AROM: Generally decreased, functional  Strength: Generally decreased, functional  Coordination: Generally decreased, functional  Tone: Normal  Sensation: Intact  ADL  Grooming: Supervision;Stand by assistance  Grooming Skilled Clinical Factors: seated at EOB with SBA/supv to wash face and brush teeth. LE Dressing: Moderate assistance;Verbal cueing; Increased time to complete  LE Dressing Skilled Clinical Factors: Pt able to don pants with mod A for LLE and to pull over waist on L, mod A provided to doff pants. Toileting: Dependent/Total  Toileting Skilled Clinical Factors: saud  Additional Comments: Pt reporting 7/10 pain throughout ADLs and appears lethargic. Pt stating \"I was only able to get an hour or 2 of rest last night\". Pt limited by fatigue     Activity Tolerance  Activity Tolerance: Patient tolerated evaluation without incident;Patient tolerated treatment well;Patient limited by fatigue;Patient limited by pain        Vision  Vision: Impaired  Vision Exceptions: Wears glasses at all times  Hearing  Hearing: Within functional limits  Cognition  Overall Cognitive Status: [facility]  Orientation  Overall Orientation Status: Within Normal Limits                  Education Given To: Patient; Family  Education Provided: Role of Therapy;Precautions; Family Education;Equipment;IADL Safety; ADL Adaptive Strategies;Transfer Training;Plan of Care  Education Provided Comments: Disease Specific Education: Pt educated on importance of OOB mobility, prevention of complications of bedrest, and general safety during hospitalization.  Pt verbalized understanding  Education Method: Verbal  Barriers to Learning: None  Education Outcome: Verbalized understanding;Continued education needed      AM-PAC Score  AM-[facility] Inpatient Daily Activity Raw Score: 17 (03/16/23 1157)  AM-PAC Inpatient ADL T-Scale Score : 37.26 (03/16/23 1157)  ADL Inpatient CMS 0-100% Score: 50.11 (03/16/23 1157)  ADL Inpatient CMS G-Code Modifier : CK (03/16/23 1157)      Goals  Short Term Goals  Time Frame for Short Term Goals: 1 week (3/30) unless noted  Short Term Goal 1: Pt will complete toilet transfer with SBA  Short Term Goal 2: Pt will complete LB dressing with CGA and use of AE PRN  Short Term Goal 3: Pt will complete 1-2 standing level ADLs with SBA  Short Term Goal 4: Pt will complete x15 BUE for strength and endurance required for ADLs  Patient Goals   Patient goals : \"I want to be able to go the bathroom IND\"       Therapy Time   Individual Concurrent Group Co-treatment   Time In 1052         Time Out 1125         Minutes 33         Timed Code Treatment Minutes: 23 Minutes (10 min eval)     If pt is unable to be seen after this session, please let this note serve as discharge summary. Please see case management note for discharge disposition. Thank you.     Malu Leigh, OT

## 2023-03-16 NOTE — CARE COORDINATION
Case Management Assessment  Initial Evaluation    Date/Time of Evaluation: 3/16/2023 1:42 PM  Assessment Completed by: July Damon RN    If patient is discharged prior to next notation, then this note serves as note for discharge by case management. Patient Name: Payal Ruby                   YOB: 1980  Diagnosis: Primary osteoarthritis of left knee [M17.12]  S/P total knee arthroplasty, left [Z96.652]                   Date / Time: 3/15/2023 10:34 AM    Patient Admission Status: Observation   Readmission Risk (Low < 19, Mod (19-27), High > 27): No data recorded  Current PCP: HENRY Urena CNP  PCP verified by CM? Yes    Chart Reviewed: Yes      History Provided by: Patient, Other (see comment) (mother Lennox Vaughan will be staying with patient to assist)  Patient Orientation: Alert and Oriented    Patient Cognition: Alert    Hospitalization in the last 30 days (Readmission):  No    If yes, Readmission Assessment in CM Navigator will be completed. Advance Directives:      Code Status: Full Code   Patient's Primary Decision Maker is: Legal Next of Kin      Discharge Planning:    Patient lives with: Children Type of Home: House  Primary Care Giver: Self  Patient Support Systems include: Parent, Family Members   Current Financial resources: Medicaid  Current community resources: None  Current services prior to admission: None            Current DME:              Type of Home Care services:  OT, PT    ADLS  Prior functional level: Independent in ADLs/IADLs  Current functional level: Assistance with the following:, Mobility, Toileting, Dressing, Bathing    PT AM-PAC: 17 /24  OT AM-PAC: 17 /24    Family can provide assistance at DC: Yes  Would you like Case Management to discuss the discharge plan with any other family members/significant others, and if so, who?  Yes (mother Lennox Vaughan)  Plans to Return to Present Housing: Unknown at present  Other Identified Issues/Barriers to RETURNING to current housing: yes  Potential Assistance needed at discharge: [facility]            Potential DME: Shower Chair, Laruth Commander, Other (Comment) (elevated toilet  seat)  Patient expects to discharge to: [facility] for transportation at discharge:      Financial    Payor: Nelson Wilson / Plan: Desi Reyez / Product Type: *No Product type* /     Does insurance require precert for SNF: Yes    Potential assistance Purchasing Medications: No  Meds-to-Beds request:        [address] [facility] [phone] - F [phone]  [address] [facility] [address]  Phone: [phone] Fax: 8257 [address] [phone] 20[address] [facility] 54 081 [address] [address]  Phone: [phone] Fax: [phone]    [address] [place] [address], [address] [place] [address], [address] [place] [phone] [facility] [phone]  [address] [facility], [address]  [facility] [address]  Phone: [phone] Fax: [phone]      Notes:    Factors facilitating achievement of predicted outcomes: Family support, Cooperative, and Pleasant    Barriers to discharge: Pain, Depression, and Lower extremity weakness    Additional Case Management Notes: To discharge home - mother will stay with her. [facility] for PT/OT - will start 3/19. The Plan for Transition of Care is related to the following treatment goals of Primary osteoarthritis of left knee [M17.12]  S/P total knee arthroplasty, left [A34.044]    IF APPLICABLE: The Patient and/or patient representative Tara Paul and her family were provided with a choice of provider and agrees with the discharge plan.  Freedom of choice list with basic dialogue that supports the patient's individualized plan of care/goals and shares the quality data associated with the providers was provided to:     Patient Representative Name:       The Patient and/or Patient Representative Agree with the Discharge Plan?      Camron Astudillo RN  Case Management Department  Ph: [phone] Fax: [phone]

## 2023-03-16 NOTE — DISCHARGE INSTR - COC
Continuity of Care Form    Patient Name: Елена Moser   :  1980  MRN:  [id number]    Admit date:  3/15/2023  Discharge date:  ***    Code Status Order: Full Code   Advance Directives:   [facility] Documentation       Date/Time Healthcare Directive Type of Healthcare Directive Copy in [address] Agent's Name Healthcare Agent's Phone Number    03/15/23 1108 No, patient does not have an advance directive for healthcare treatment -- -- -- -- --            Admitting Physician:  Mynor Joe MD  PCP: Rasta Storey, APRN - CNP    Discharging Nurse: [facility] Unit/Room#: 8344/0468-92  Discharging Unit Phone Number: ***    Emergency Contact:   Extended Emergency Contact Information  Primary Emergency Contact: [place], [address] [place] [address] Phone: [phone]  Relation: Parent    Past Surgical History:  Past Surgical History:   Procedure Laterality Date    FOOT SURGERY      KNEE SURGERY Left 6/12/15    LEG SURGERY         Immunization History:   Immunization History   Administered Date(s) Administered    COVID-19, MODERNA BLUE border, Primary or Immunocompromised, (age 12y+), IM, 100 mcg/0.5mL 2021, 2021    COVID-19, MODERNA Booster BLUE border, (age 18y+), IM, 50mcg/0.25mL 2021    Influenza, FLUCELVAX, (age 10 mo+), MDCK, PF, 0.5mL 2022       Active Problems:  Patient Active Problem List   Diagnosis Code    Other cerebral palsy ([facility] G80.8    Bipolar disease, chronic ([facility] F31.9    MDD (major depressive disorder), recurrent episode, moderate (HCC) F33.1    PTSD (post-traumatic stress disorder) F43.10    Tear of medial cartilage or meniscus of knee, current [id number]    Unequal leg length (acquired) M21.70    Pain in joint, lower leg M25.569    Patellar subluxation S83.003A    Closed patellar dislocation S83.006A    Knee effusion M25.469    Left knee pain M25.562    Primary osteoarthritis of left knee M17.12    Hypertension I10    Trochanteric bursitis of right hip M70.61    Pain of right hip joint M25.551    Morbid obesity with BMI of 45.0-49.9, adult (HCC) E66.01, Z68.42    Locked knee, left M23.92    Patellofemoral arthritis of left knee M17.12    Post-operative nausea and vomiting R11.2, Z98.890    S/P total knee arthroplasty, left H66.078       Isolation/Infection:   Isolation            Contact          Patient Infection Status       Infection Onset Added Last Indicated Last Indicated By Review Planned Expiration Resolved Resolved By    [facility] 02/02/23 02/03/23 02/02/23 MRSA DNA PROBE                Nurse Assessment:  Last Vital Signs: /67   Pulse (!) 104   Temp 98.2 °F (36.8 °C) (Oral)   Resp 18   Ht 5' 5\" (1.651 m)   Wt 273 lb 9.6 oz (124.1 kg)   SpO2 94%   BMI 45.53 kg/m²     Last documented pain score (0-10 scale): Pain Level: 7  Last Weight:   Wt Readings from Last 1 Encounters:   03/15/23 273 lb 9.6 oz (124.1 kg)     Mental Status:  oriented and alert    IV Access:  - None    Nursing Mobility/ADLs:  Walking   Assisted  Transfer  Assisted  Bathing  Assisted  Dressing  Assisted  Toileting  Assisted  Feeding  Assisted  Med Admin  Assisted  Med Delivery   whole    Wound Care Documentation and Therapy:  Incision 03/15/23 Knee Anterior; Left (Active)   Dressing/Treatment Other (comment) 03/15/23 2006   Closure Surgical glue 03/15/23 1600   Number of days: 0        Elimination:  Continence: Bowel: Yes  Bladder: Yes  Urinary Catheter: None   Colostomy/Ileostomy/Ileal Conduit: No       Date of Last BM: unknown    Intake/Output Summary (Last 24 hours) at 3/16/2023 0943  Last data filed at 3/16/2023 0634  Gross per 24 hour   Intake 1000 ml   Output 550 ml   Net 450 ml     I/O last 3 completed shifts: In: 1000 [I.V.:1000]  Out: 550 [Urine:450; Blood:100]    Safety Concerns:      At Risk for Falls    Impairments/Disabilities:      None    Nutrition Therapy:  Current Nutrition Therapy:   - Oral Diet:  General    Routes of Feeding: Oral  Liquids: Thin Liquids  Daily Fluid Restriction: no  Last Modified Barium Swallow with Video (Video Swallowing Test): not done    Treatments at the Time of Hospital Discharge:   Respiratory Treatments: NA  Oxygen Therapy:  is not on home oxygen therapy. Ventilator:    - No ventilator support    Rehab Therapies: Physical Therapy and Occupational Therapy; HRS if patient agreeable  Weight Bearing Status/Restrictions: No weight bearing restrictions  Other Medical Equipment (for information only, NOT a DME order):     Other Treatments: HOME HEALTH CARE: LEVEL 1 STANDARD    Home health agency to establish plan of care for patient over 60 day period   Nursing  Initial home SN evaluation visit to occur within 24-48 hours for:  1)  medication management  2)  VS and clinical assessment  3)  S&S chronic disease exacerbation education + when to contact MD/NP  4)  care coordination  Medication Reconciliation during 1st SN visit    PT/OT   Evaluate with goal of regaining prior level of functioning   OT to evaluate if patient has [address] needs for personal care    PCP Visit scheduled within 7 days of hospital discharge   Telehealth-Homecare Vitals(If patient is agreeable and meets guidelines)      Patient's personal belongings (please select all that are sent with patient):  None    RN SIGNATURE:  Electronically signed by Davin Gaona RN on 3/18/23 at 2:22 PM EDT    CASE MANAGEMENT/SOCIAL WORK SECTION    Inpatient Status Date: ***    Readmission Risk Assessment Score:  Readmission Risk              Risk of Unplanned Readmission:  0           Discharging to Facility/ Agency   Name:  [facility] care    Address: [address] [place] [address], [address] [place] [address], [place] [address]  Phone: [phone]  Fax: [phone]    Dialysis Facility (if applicable)   Name:  Address:  Dialysis Schedule:  Phone:  Fax:    / signature: {Esignature:[id number]}    PHYSICIAN SECTION    Prognosis: Good    Condition at Discharge: Stable    Rehab Potential (if transferring to Rehab): Good    Recommended Follow-up, Labs or Other Treatments After Discharge:    PT/OT- Weightbearing as tolerated to the left lower extremity. Limit knee range of motion from 0 to 90 degrees for the first 2 weeks postop, gentle range of motion with therapy encouraged  Aspirin 81mg twice daily for 30 days post op as DVT prophylaxis  Follow up with Dr Keshawn Phoenix as scheduled. Call [employer] at [phone] with any questions           Physician Certification: I certify the above information and transfer of Eran Zimmerman  is necessary for the continuing treatment of the diagnosis listed and that she requires Home Care for greater 30 days.      Update Admission H&P: No change in H&P    PHYSICIAN SIGNATURE:  Electronically signed by JARVIS Patrick on 3/17/23 at *** PM EDT

## 2023-03-17 LAB
BASOPHILS # BLD: 0 K/UL (ref 0–0.2)
BASOPHILS NFR BLD: 0 %
DEPRECATED RDW RBC AUTO: 14.4 % (ref 12.4–15.4)
EOSINOPHIL # BLD: 0 K/UL (ref 0–0.6)
EOSINOPHIL NFR BLD: 0 %
HCT VFR BLD AUTO: 36.8 % (ref 36–48)
HGB BLD-MCNC: 12.2 G/DL (ref 12–16)
LYMPHOCYTES # BLD: 0.9 K/UL (ref 1–5.1)
LYMPHOCYTES NFR BLD: 7.2 %
MCH RBC QN AUTO: 27.1 PG (ref 26–34)
MCHC RBC AUTO-ENTMCNC: 33.2 G/DL (ref 31–36)
MCV RBC AUTO: 81.6 FL (ref 80–100)
MONOCYTES # BLD: 1 K/UL (ref 0–1.3)
MONOCYTES NFR BLD: 7.6 %
NEUTROPHILS # BLD: 10.9 K/UL (ref 1.7–7.7)
NEUTROPHILS NFR BLD: 85.2 %
PLATELET # BLD AUTO: 309 K/UL (ref 135–450)
PMV BLD AUTO: 6.8 FL (ref 5–10.5)
RBC # BLD AUTO: 4.5 M/UL (ref 4–5.2)
WBC # BLD AUTO: 12.8 K/UL (ref 4–11)

## 2023-03-17 PROCEDURE — 97535 SELF CARE MNGMENT TRAINING: CPT

## 2023-03-17 PROCEDURE — G0378 HOSPITAL OBSERVATION PER HR: HCPCS

## 2023-03-17 PROCEDURE — 97116 GAIT TRAINING THERAPY: CPT

## 2023-03-17 PROCEDURE — 97110 THERAPEUTIC EXERCISES: CPT

## 2023-03-17 PROCEDURE — 2580000003 HC RX 258: Performed by: STUDENT IN AN ORGANIZED HEALTH CARE EDUCATION/TRAINING PROGRAM

## 2023-03-17 PROCEDURE — 85025 COMPLETE CBC W/AUTO DIFF WBC: CPT

## 2023-03-17 PROCEDURE — 6370000000 HC RX 637 (ALT 250 FOR IP): Performed by: STUDENT IN AN ORGANIZED HEALTH CARE EDUCATION/TRAINING PROGRAM

## 2023-03-17 PROCEDURE — 6370000000 HC RX 637 (ALT 250 FOR IP): Performed by: SPECIALIST/TECHNOLOGIST

## 2023-03-17 PROCEDURE — 99024 POSTOP FOLLOW-UP VISIT: CPT | Performed by: SPECIALIST/TECHNOLOGIST

## 2023-03-17 PROCEDURE — 97530 THERAPEUTIC ACTIVITIES: CPT

## 2023-03-17 PROCEDURE — 36415 COLL VENOUS BLD VENIPUNCTURE: CPT

## 2023-03-17 PROCEDURE — 96376 TX/PRO/DX INJ SAME DRUG ADON: CPT

## 2023-03-17 PROCEDURE — APPNB60 APP NON BILLABLE TIME 46-60 MINS: Performed by: SPECIALIST/TECHNOLOGIST

## 2023-03-17 PROCEDURE — 6360000002 HC RX W HCPCS: Performed by: STUDENT IN AN ORGANIZED HEALTH CARE EDUCATION/TRAINING PROGRAM

## 2023-03-17 RX ADMIN — DEXAMETHASONE SODIUM PHOSPHATE 8 MG: 10 INJECTION INTRAMUSCULAR; INTRAVENOUS at 09:32

## 2023-03-17 RX ADMIN — ARIPIPRAZOLE 10 MG: 10 TABLET ORAL at 20:41

## 2023-03-17 RX ADMIN — ASPIRIN 81 MG: 81 TABLET, COATED ORAL at 09:28

## 2023-03-17 RX ADMIN — Medication 10 ML: at 20:41

## 2023-03-17 RX ADMIN — OXYCODONE HYDROCHLORIDE 5 MG: 5 TABLET ORAL at 18:32

## 2023-03-17 RX ADMIN — ESCITALOPRAM OXALATE 20 MG: 10 TABLET ORAL at 09:31

## 2023-03-17 RX ADMIN — POLYETHYLENE GLYCOL 3350 17 G: 17 POWDER, FOR SOLUTION ORAL at 09:27

## 2023-03-17 RX ADMIN — DEXAMETHASONE SODIUM PHOSPHATE 8 MG: 10 INJECTION INTRAMUSCULAR; INTRAVENOUS at 20:41

## 2023-03-17 RX ADMIN — ACETAMINOPHEN 325MG 650 MG: 325 TABLET ORAL at 10:55

## 2023-03-17 RX ADMIN — HYDROXYZINE PAMOATE 25 MG: 25 CAPSULE ORAL at 00:07

## 2023-03-17 RX ADMIN — BUSPIRONE HYDROCHLORIDE 15 MG: 5 TABLET ORAL at 20:40

## 2023-03-17 RX ADMIN — METHOCARBAMOL 500 MG: 500 TABLET ORAL at 09:29

## 2023-03-17 RX ADMIN — Medication 10 ML: at 09:29

## 2023-03-17 RX ADMIN — LAMOTRIGINE 200 MG: 100 TABLET ORAL at 20:41

## 2023-03-17 RX ADMIN — METHOCARBAMOL 500 MG: 500 TABLET ORAL at 20:41

## 2023-03-17 RX ADMIN — ASPIRIN 81 MG: 81 TABLET, COATED ORAL at 20:41

## 2023-03-17 RX ADMIN — ACETAMINOPHEN 325MG 650 MG: 325 TABLET ORAL at 02:56

## 2023-03-17 RX ADMIN — CELECOXIB 100 MG: 100 CAPSULE ORAL at 09:28

## 2023-03-17 RX ADMIN — METHOCARBAMOL 500 MG: 500 TABLET ORAL at 15:41

## 2023-03-17 RX ADMIN — PRAZOSIN HYDROCHLORIDE 2 MG: 1 CAPSULE ORAL at 20:40

## 2023-03-17 RX ADMIN — OXYCODONE HYDROCHLORIDE 5 MG: 5 TABLET ORAL at 00:07

## 2023-03-17 RX ADMIN — ACETAMINOPHEN 325MG 650 MG: 325 TABLET ORAL at 16:43

## 2023-03-17 RX ADMIN — PANTOPRAZOLE SODIUM 40 MG: 40 TABLET, DELAYED RELEASE ORAL at 06:03

## 2023-03-17 RX ADMIN — METOPROLOL SUCCINATE 50 MG: 50 TABLET, EXTENDED RELEASE ORAL at 09:31

## 2023-03-17 RX ADMIN — BUSPIRONE HYDROCHLORIDE 15 MG: 5 TABLET ORAL at 09:30

## 2023-03-17 RX ADMIN — CELECOXIB 100 MG: 100 CAPSULE ORAL at 20:41

## 2023-03-17 ASSESSMENT — PAIN DESCRIPTION - LOCATION
LOCATION: HEAD
LOCATION: KNEE;HEAD

## 2023-03-17 ASSESSMENT — PAIN SCALES - GENERAL
PAINLEVEL_OUTOF10: 6
PAINLEVEL_OUTOF10: 6
PAINLEVEL_OUTOF10: 7
PAINLEVEL_OUTOF10: 6
PAINLEVEL_OUTOF10: 5

## 2023-03-17 ASSESSMENT — PAIN DESCRIPTION - DESCRIPTORS
DESCRIPTORS: ACHING
DESCRIPTORS: ACHING;DULL

## 2023-03-17 ASSESSMENT — PAIN DESCRIPTION - ORIENTATION: ORIENTATION: LEFT

## 2023-03-17 NOTE — PROGRESS NOTES
Occupational Therapy  Facility/Department: [facility] C5 - MED SURG/ORTHO  Daily Treatment Note  NAME: Blaire Hamm  : 1980  MRN: [id number]    Date of Service: 3/17/2023    Discharge Recommendations:  24 hour supervision or assist    OT Equipment Recommendations  Equipment Needed: Yes  Mobility Devices: ADL Assistive Devices  ADL Assistive Devices: Transfer Tub Bench;Toilet Safety Frame    AM-PAC score  AM-PAC Inpatient Daily Activity Raw Score: 22 (23 1507)  AM-PAC Inpatient ADL T-Scale Score : 47.1 (23 1507)  ADL Inpatient CMS 0-100% Score: 25.8 (23 1507)  ADL Inpatient CMS G-Code Modifier : CJ (23 150)      Patient Diagnosis(es): The encounter diagnosis was S/P total knee arthroplasty, left.     Assessment    Assessment: Pt demos good progress toward OT goals. Pt grossly SBA with all functional transfers and mobility using RW. Pt demos SBA for LB dressing (underwear and shorts). Long discussion with pt and pt's mother about DME. Pt will require toilet safety frame for UE support to safely complete transfers, will also require TTB due to inability to step into tub following TKA. Continue to recommend pt return home with 24 hr A.  Activity Tolerance: Patient tolerated treatment well  Discharge Recommendations: 24 hour supervision or assist  Equipment Needed: Yes  Mobility Devices: ADL Assistive Devices      Plan   Occupational Therapy Plan  Times Per Week: 4-6x     Restrictions  Restrictions/Precautions  Restrictions/Precautions: Weight Bearing;Surgical Protocols;General Precautions;Fall Risk  Required Braces or Orthoses?: No  Lower Extremity Weight Bearing Restrictions  Left Lower Extremity Weight Bearing: Weight Bearing As Tolerated    Subjective   Subjective  Subjective: Pt resting in bed, reports headache, agreeable to OT treatment.    Pain: Pt reports 5/10 pain in L knee.    Orientation  Overall Orientation Status: Within Normal Limits    Cognition  Overall Cognitive Status: WNL     Objective    Vitals  Vitals  Heart Rate: 85  Heart Rate Source: Monitor  BP: 114/74  BP Location: Right upper arm  BP Method: Automatic  Patient Position: Semi fowlers  MAP (Calculated): 87  SpO2: 90 %  O2 Device: None (Room air)    Bed Mobility Training  Bed Mobility Training: Yes  Overall Level of Assistance: Additional time;Supervision  Interventions: Safety awareness training;Verbal cues; Visual cues; Tactile cues  Supine to Sit: Supervision; Additional time (to R with HOB elevated)  Sit to Supine: Supervision  Scooting: Supervision    Balance  Sitting: Intact  Standing: Impaired (RW)  Standing - Static: Good  Standing - Dynamic: Good    Transfer Training  Transfer Training: Yes  Overall Level of Assistance: Contact-guard assistance (with RW)  Interventions: Safety awareness training;Verbal cues; Visual cues; Tactile cues  Sit to Stand: Stand-by assistance  Stand to Sit: Stand-by assistance  Toilet Transfer: Stand-by assistance; Adaptive equipment (use of stiven grab bars)       ADL  Grooming: Stand by assistance;Supervision  Grooming Skilled Clinical Factors: in stance at sink  LE Dressing: Stand by assistance  LE Dressing Skilled Clinical Factors: underwear and pants  Toileting: Stand by assistance          Safety Devices  Type of Devices: Left in bed;Bed alarm in place;Call light within reach;Nurse notified;Gait belt      Patient Education  Education Given To: Patient; Family  Education Provided: Role of Therapy;Plan of Care;ADL Adaptive Strategies;Transfer Training; Fall Prevention Strategies; Equipment; Family Education  Education Method: Demonstration;Verbal  Barriers to Learning: None  Education Outcome: Verbalized understanding;Demonstrated understanding    Goals  Short Term Goals  Time Frame for Short Term Goals: 1 week (3/30) unless noted  Short Term Goal 1: Pt will complete toilet transfer with SBA-- GOAL MET, pt demos SBA with use of grab bars 3/17/23  Short Term Goal 2: Pt will complete LB dressing with CGA and use of AE PRN-- goal partially met for underwear and shorts SBA no AE 3/17/23  Short Term Goal 3: Pt will complete 1-2 standing level ADLs with SBA-- GOAL MET, pt demos sink ADLs SBA 3/17/23  Short Term Goal 4: Pt will complete x15 BUE for strength and endurance required for ADLs-- ongoing  Patient Goals   Patient goals : \"I want to be able to go the bathroom IND\"       Therapy Time   Individual Concurrent Group Co-treatment   Time In 1345         Time Out 1440         Minutes MARCELLA Manzo/TARUN

## 2023-03-17 NOTE — PROGRESS NOTES
Physical Therapy  Facility/Department: [facility] C5 - MED SURG/ORTHO  Daily Treatment Note  NAME: Enriqueta Phoenix  : 1980  MRN: [id number]    Date of Service: 3/17/2023    Discharge Recommendations:  24 hour supervision or assist, Home with Home health PT   PT Equipment Recommendations  Equipment Needed: Yes  Mobility Devices: Dearl Stalling: Rolling    Patient Diagnosis(es): The encounter diagnosis was S/P total knee arthroplasty, left. Assessment   Assessment: Pt tolerated treatment session well this date and able to progress with therex reps and did not require assistance to perform AROM exercises. Pt able to ambulate up to 35 ft + 25 ft with RW and CGA with increased time. Pt also progressed to supv for bed mobility and CGA for transfers. Pt limited by fatigue and pain during session, but participates well. Additional time spent educating patient and family about discharge recommendations and options as well as home equipment and safety modifications. Continue to recommend home with 24/7 A and HHPT at d/c (pt's family wishes for HHPT to begin immediately post discahrge, need to confirm with ortho surgery team). Activity Tolerance: Patient tolerated treatment well;Patient limited by fatigue;Patient limited by endurance; Patient limited by pain  Equipment Needed: Yes  Mobility Devices: [address]    Physcial Therapy Plan  General Plan: 2 times a day 7 days a week  Specific Instructions for Next Treatment: Progress ther ex and mobility as tolerated, curb step  Current Treatment Recommendations: Strengthening;ROM;Balance training;Functional mobility training;Transfer training;Gait training;Stair training;Home exercise program;Safety education & training;Patient/Caregiver education & training;Equipment evaluation, education, & procurement; Therapeutic activities     Restrictions  Restrictions/Precautions  Restrictions/Precautions: Weight Bearing, Surgical Protocols, General Precautions, Fall Risk  Required Braces or Orthoses?: No  Lower Extremity Weight Bearing Restrictions  Left Lower Extremity Weight Bearing: Weight Bearing As Tolerated  Position Activity Restriction  Other position/activity restrictions: Currently on 0.5L O2 post-operatively     Subjective    Subjective  Subjective: Pt found in bed, agreeable to therapy  Pain: 5/10 pain in LLE  Orientation  Overall Orientation Status: Within Normal Limits     Objective   Vitals  Heart Rate: 93  Heart Rate Source: Monitor  BP: 118/84  BP Location: Right upper arm  BP Method: Automatic  Patient Position: Semi fowlers  MAP (Calculated): 95  SpO2: 94 %  O2 Device: None (Room air)  Bed Mobility Training  Bed Mobility Training: Yes  Overall Level of Assistance: Additional time;Supervision  Interventions: Safety awareness training;Verbal cues; Visual cues; Tactile cues  Supine to Sit: Supervision; Additional time  Sit to Supine: Other (comment) (not assessed, pt left in chair at end of session)  Scooting: Supervision  Balance  Sitting: Intact  Standing: Impaired  Standing - Static: Constant support;Good (with RW)  Standing - Dynamic: Constant support;Fair;Good (with RW)  Transfer Training  Transfer Training: Yes  Overall Level of Assistance: Contact-guard assistance (with RW)  Interventions: Safety awareness training;Verbal cues; Visual cues; Tactile cues  Sit to Stand: Contact-guard assistance  Stand to Sit: Contact-guard assistance  Bed to Chair: Contact-guard assistance; Additional time (via ambulation)  Gait Training  Gait Training: Yes  Left Side Weight Bearing: As tolerated  Gait  Overall Level of Assistance: Contact-guard assistance; Additional time  Interventions: Verbal cues; Safety awareness training;Visual cues; Weight shifting training/pressure relief  Base of Support: Shift to right  Speed/Molly: Pace decreased (< 100 feet/min); Shuffled; Slow  Step Length: Left shortened;Right shortened  Stance: Left decreased;Time;Weight shift  Gait Abnormalities: Decreased step clearance; Step to gait; Antalgic  Distance (ft): 35 Feet (35 ft + 25 ft in room)  Assistive Device: Walker, rolling;Gait belt     PT Exercises  Exercise Treatment: x 15 BLE: Ankle pumps, glut sets, quad sets, SLR, heel slides     Safety Devices  Type of Devices: All fall risk precautions in place;Call light within reach;Gait belt;Patient at risk for falls;Nurse notified; Left in chair;Chair alarm in place  Restraints  Restraints Initially in Place: No     [facility] 6 Clicks Inpatient Mobility:  AM-PAC Basic Mobility - Inpatient   How much help is needed turning from your back to your side while in a flat bed without using bedrails?: A Little  How much help is needed moving from lying on your back to sitting on the side of a flat bed without using bedrails?: A Little  How much help is needed moving to and from a bed to a chair?: A Little  How much help is needed standing up from a chair using your arms?: A Little  How much help is needed walking in hospital room?: A Little  How much help is needed climbing 3-5 steps with a railing?: A Lot  AM-PAC Inpatient Mobility Raw Score : 17  AM-PAC Inpatient T-Scale Score : 42.13  Mobility Inpatient CMS 0-100% Score: 50.57  Mobility Inpatient CMS G-Code Modifier : CK    Goals  Short Term Goals  Time Frame for Short Term Goals: 2 days, 3/18/23 (unless otherwise specified)  Short Term Goal 1: Pt will transfer supine <-> sit with supervision  - MET 3/17  Short Term Goal 2: Pt will transfer sit <-> stand and bed>chair using RW with supervision  Short Term Goal 3: Pt will ambulate x 100 feet using RW with supervision  Short Term Goal 4: By 3/17/23: Pt will tolerate 12-15 reps appropriate LLE ther ex for ROM/strengthening   - MET 3/17  Short Term Goal 5: Pt will ambulate up/down 6\" curb step using RW with CGA/SBA x 1  Patient Goals   Patient Goals :  \"To be able to walk around my apartment (distances of 40 feet) using my walker without help from my family (SBA or less)\" by 3/17/23 - MET 3/17    Education  Patient Education  Education Given To: Patient; Family (mother)  Education Provided: Role of Therapy;Plan of Care;Home Exercise Program;Precautions;Transfer Training;Family Education;Equipment; Fall Prevention Strategies  Education Provided Comments: importance of mobility, d/c recommendations and options, equipment recs and safety modifications  Education Method: Demonstration;Verbal  Barriers to Learning: None  Education Outcome: Verbalized understanding;Demonstrated understanding    Therapy Time   Individual Concurrent Group Co-treatment   Time In 1028         Time Out 1117         Minutes 49         Timed Code Treatment Minutes: 52 Minutes     If pt is unable to be seen after this session, please let this note serve as discharge summary. Please see case management note for discharge disposition. Thank you.     Shaka Howard, PT

## 2023-03-17 NOTE — PROGRESS NOTES
Physical Therapy  Facility/Department: [facility] C5 - MED SURG/ORTHO  Daily Treatment Note  NAME: Marilou Archer  : 1980  MRN: [id number]    Date of Service: 3/17/2023    Discharge Recommendations:  24 hour supervision or assist, Home with Home health PT   PT Equipment Recommendations  Equipment Needed: Yes  Mobility Devices: Tiffanie Lair: Rolling    Patient Diagnosis(es): The encounter diagnosis was S/P total knee arthroplasty, left. If pt is unable to be seen after this session, please let this note serve as discharge summary. Please see case management note for discharge disposition. Thank you. Patient Educated in safety with car transfers use of assistive device with patient demonstrating:  [x] Verbalizing understanding of appropriate technique, maintaining any ordered precautions for car transfer training s/p TJR  [x] He/she requires minimum assist and will have someone at discharge to help with transfers with patient verbalizing understanding of any ordered TJR precautions employing appropriate technique. [x] Other: Educated patient in car transfer instruction with patient verbalizing understanding of appropriate techniques. Assessment   Assessment: Patient is making excellent progress with her therapy goals. Today the patient completed bed mobility with supervision, transfrs with supervision and ambulated up to 100 feet with supervision. Patient also completed her exercises with supervision. Patient is below her prior level of function and would benefit from skilled PT plan of care. PT recommends home PT to address her therapy deficits. Patient is safe for home, when medically stable, with 24/7 supervision/assistance, home PT and continued use of her rolling walker. PT to continue to follow.   Activity Tolerance: Patient tolerated treatment well  Equipment Needed: Yes  Mobility Devices: Agip U. 91. Therapy Plan  General Plan: 2 times a day 7 days a week  Specific Instructions for Next Treatment: Progress ther ex and mobility as tolerated, curb step  Current Treatment Recommendations: Strengthening;ROM;Balance training;Functional mobility training;Transfer training;Gait training;Stair training;Home exercise program;Safety education & training;Patient/Caregiver education & training;Equipment evaluation, education, & procurement; Therapeutic activities     Restrictions  Restrictions/Precautions  Restrictions/Precautions: Weight Bearing, Surgical Protocols, General Precautions, Fall Risk  Required Braces or Orthoses?: No  Lower Extremity Weight Bearing Restrictions  Left Lower Extremity Weight Bearing: Weight Bearing As Tolerated  Position Activity Restriction  Other position/activity restrictions: Currently on 0.5L O2 post-operatively     Subjective    Subjective  Subjective: Patient found in bed, agreeable to therapy  Pain: headache, left knee pain. Patient said she rated in 6/10. Allectus.Furnace nurse gave me tylenol 10 minutes before you came in. \"  Orientation  Overall Orientation Status: Within Normal Limits  Cognition  Overall Cognitive Status: WNL     Objective   Vitals  Heart Rate: 75  Heart Rate Source: Monitor  BP: 134/86  BP Location: Right upper arm  BP Method: Automatic  Patient Position: Sitting  MAP (Calculated): 102  SpO2: 94 %  O2 Device: None (Room air)  Comment: post ambulation with patient in the chair: 137/91 80 BPM 96% on room air.   Bed Mobility Training  Bed Mobility Training: Yes  Overall Level of Assistance: Supervision  Interventions: Safety awareness training;Verbal cues  Rolling: Supervision  Supine to Sit: Supervision  Sit to Supine: Other (comment) (patient in chair at the end of the therapy session)  Scooting: Supervision  Balance  Sitting: Intact  Standing: Intact  Standing - Static: Good  Standing - Dynamic: Good  Transfer Training  Transfer Training: Yes  Overall Level of Assistance: Supervision  Interventions: Safety awareness training;Verbal cues  Sit to Stand: Supervision  Stand to Sit: Supervision  Bed to Chair: Supervision  Toilet Transfer: Supervision  Gait Training  Gait Training: Yes  Left Side Weight Bearing: As tolerated  Gait  Overall Level of Assistance: Supervision;Additional time  Interventions: Safety awareness training;Verbal cues  Base of Support: Center of gravity altered;Shift to right  Speed/Molly: Pace decreased (< 100 feet/min);Shuffled;Slow  Step Length: Right shortened;Left shortened  Stance: Left decreased;Time;Weight shift  Gait Abnormalities: Antalgic;Decreased step clearance;Trunk sway increased  Distance (ft): 100 Feet (100 feet with RW and supervision. 40 feet with RW and supervision. No complaints of shortness of breath, chest pain or dizziness. No loss of balance.)  Assistive Device: Walker, rolling;Gait belt  Number of Stairs Trained:  (Patient said that she has 2 entrances into her apartment. One entrance has 1 step to enter without any rails. The other entrance requires her to ambulate about 50-75 feet from her parking spot and then enter her home without any stairs.)  Curbs/Ramps:  (Patient said that since she is more ambulatory now she is no longer interested in attempting her stair climbing goal since the entrance without stairs is safer.)     PT Exercises  Exercise Treatment: 1 x 15 LLE: heel slides (with towel underneath LLE. AROM range ~-2-70 degrees). 1 x 15 LLE long arc quads.     Safety Devices  Type of Devices: Call light within reach;Nurse notified;Gait belt;Left in chair;Chair alarm in place;Patient at risk for falls;All fall risk precautions in place (patient's mother at bedside)  Restraints  Restraints Initially in Place: No       Goals  Short Term Goals  Time Frame for Short Term Goals: 2 days, 3/18/23 (unless otherwise specified)  Short Term Goal 1: Pt will transfer supine <-> sit with supervision  - MET 3/17 3/17 new goal complete bed mobility with mod I.  Short Term Goal 2: Pt will transfer sit <-> stand and bed>chair  using RW with supervision 3/17 goal met. New goal complete transfers with mod I. Short Term Goal 3: Pt will ambulate x 100 feet using RW with supervision 3/17 goal met. New goal: ambulate 150 feet with RW with supervision. Short Term Goal 4: By 3/17/23: Pt will tolerate 12-15 reps appropriate LLE ther ex for ROM/strengthening   - MET 3/17  Short Term Goal 5: Pt will ambulate up/down 6\" curb step using RW with CGA/SBA x 1 3/17 Patient said that she has 2 entrances into her apartment. One entrance has 1 step to enter without any rails. The other entrance requires her to ambulate about 50-75 feet from her parking spot and then enter her home without any stairs. Patient said that since she is more ambulatory now she is no longer interested in attempting her stair climbing goal since the entrance without stairs is safer. Patient's stair/curb goal has been discontinued. Patient Goals   Patient Goals : \"To be able to walk around my apartment (distances of 40 feet) using my walker without help from my family (SBA or less)\" by 3/17/23 - MET 3/17    Education  Patient Education  Education Given To: Patient; Family  Education Provided: Role of Therapy;Plan of Care;Home Exercise Program;Precautions;Transfer Training;Family Education;Equipment; Fall Prevention Strategies  Education Provided Comments: importance of mobility, d/c recommendations and options, equipment recs, safety with car transfers and safety modifications  Education Method: Demonstration;Verbal  Barriers to Learning: None  Education Outcome: Verbalized understanding;Demonstrated understanding  [facility] 6 Clicks Inpatient Mobility:  AM-PAC Basic Mobility - Inpatient   How much help is needed turning from your back to your side while in a flat bed without using bedrails?: A Little  How much help is needed moving from lying on your back to sitting on the side of a flat bed without using bedrails?: A Little  How much help is needed moving to and from a bed to a chair?: A Little  How much help is needed standing up from a chair using your arms?: A Little  How much help is needed walking in hospital room?: A Little  How much help is needed climbing 3-5 steps with a railing?: Total (patient declined to attempt today)  AM-PAC Inpatient Mobility Raw Score : 16  AM-PAC Inpatient T-Scale Score : 40.78  Mobility Inpatient CMS 0-100% Score: 54.16  Mobility Inpatient CMS G-Code Modifier : CK   Therapy Time   Individual Concurrent Group Co-treatment   Time In 1655         Time Out 1735         Minutes 40         Timed Code Treatment Minutes: 40 Minutes       Saloni Gonzalez, PT

## 2023-03-17 NOTE — PROGRESS NOTES
Department of Orthopedic Surgery  Physician Assistant   Progress Note    Subjective:       Systemic or Specific Complaints: pt states she feels improvement in pain and functional status. Was able to ambulate about the room a few times with therapy. Pt's mother at bedside, concerned about pt safety returning home, pt will have recliner delivered tomorrow     Objective:     Patient Vitals for the past 24 hrs:   BP Temp Temp src Pulse Resp SpO2   03/17/23 0926 131/83 98.1 °F (36.7 °C) Oral 97 18 94 %   03/17/23 0037 -- -- -- -- 14 --   03/17/23 0000 123/85 97.8 °F (36.6 °C) Oral 73 16 95 %   03/16/23 2102 128/85 98.5 °F (36.9 °C) Oral 89 18 93 %   03/16/23 1747 -- -- -- -- 18 --   03/16/23 1556 125/84 98.2 °F (36.8 °C) Oral 83 18 94 %   03/16/23 1435 (!) 141/84 -- -- 96 -- 96 %   03/16/23 1300 -- -- -- -- 18 --   03/16/23 1230 -- -- -- -- 18 --       General: alert, appears stated age, cooperative, and no distress   Wound: Wound clean and dry no evidence of infection. , No Erythema, No Drainage, and Positive for Edema   Motion: Painful range of Motion in affected extremity, seated in chair, knee to 80 degrees flexion   DVT Exam: No evidence of DVT seen on physical exam.  No cords or calf tenderness. No significant calf/ankle edema. Additional exam:  Patient sitting up in chair at time of interview, left knee propped up on footstool.    Mepilex clean, dry, intact  Left leg at neutral alignment  Swelling difficult to appreciate secondary to body habitus  Thigh and calf compartments soft and compressible, nontender to palpation  EHL, FHL, gastroc, anterior tib motor intact  Sensation intact to light touch  DP and PT pulses 2+      Data Review  CBC:   Lab Results   Component Value Date/Time    WBC 12.8 03/17/2023 05:46 AM    RBC 4.50 03/17/2023 05:46 AM    HGB 12.2 03/17/2023 05:46 AM    HCT 36.8 03/17/2023 05:46 AM     03/17/2023 05:46 AM       Renal:   Lab Results   Component Value Date/Time     03/16/2023 05:50 AM    K 4.6 03/16/2023 05:50 AM    CL 99 03/16/2023 05:50 AM    CO2 22 03/16/2023 05:50 AM    BUN 8 03/16/2023 05:50 AM    CREATININE 0.8 03/16/2023 05:50 AM    GLUCOSE 162 03/16/2023 05:50 AM    CALCIUM 8.9 03/16/2023 05:50 AM        EBL less than 50 mL    Assessment:      left total knee arthroplasty, postop day 2. Date of surgery 3/50/5833 by Dr. Randy Arango:      1: Weightbearing as tolerated to the left lower extremity. Limit knee range of motion from 0 to 90 degrees for the first 2 weeks postop, gentle range of motion with therapy encouraged  2:  Continue Deep venous thrombosis prophylaxis-aspirin 81 mg twice daily by mouth for 1 month  3:  Continue Pain Control  4: PT/OT eval recommending 24 hour supervision or assist, Home with Home health PT, pt/family wants immediate home PT for mobility training/home safety eval, okay with ortho team, orders placed for [facility]. DME ordered- TTB, toilet frame, walker  Pt has tub/ shower unit and will require TTB to maximize safety and IND during shower transfers. Pt would also benefit from a toilet seat frame with arms to decrease risk of fall and maximize safety during STS from standard toilet height. Patient requires walker to complete ADLs that could not be done without a crutch or cane. 5: 2 doses IV Ancef completed postoperatively  6: Discharge tomorrow once equipment has been delivered.  DCP updated, scripts sent to [place], [place]

## 2023-03-17 NOTE — CARE COORDINATION
[facility]    DC order noted, all docs needed have been faxed to [facility] for home care services.     Home care to see patient within 24-hrs    Sj Worrell RN, BSN CTN  [facility] [phone]

## 2023-03-17 NOTE — CARE COORDINATION
Discussion with patient's mother regarding care plan. Mother states she is concerned about being sole caregiver and having to help with bathing and dressing. Writer [facility], Josselyn May, who states she will communicated concerns to agency RN and try to increase care with aide visits to assist with bathing. DME ordered and referral to [address] to supply.      LG

## 2023-03-18 VITALS
HEIGHT: 65 IN | DIASTOLIC BLOOD PRESSURE: 70 MMHG | OXYGEN SATURATION: 94 % | HEART RATE: 79 BPM | WEIGHT: 273.6 LBS | SYSTOLIC BLOOD PRESSURE: 111 MMHG | RESPIRATION RATE: 18 BRPM | BODY MASS INDEX: 45.58 KG/M2 | TEMPERATURE: 97.9 F

## 2023-03-18 LAB
BASOPHILS # BLD: 0 K/UL (ref 0–0.2)
BASOPHILS NFR BLD: 0.1 %
DEPRECATED RDW RBC AUTO: 14.8 % (ref 12.4–15.4)
EOSINOPHIL # BLD: 0 K/UL (ref 0–0.6)
EOSINOPHIL NFR BLD: 0 %
HCT VFR BLD AUTO: 35.4 % (ref 36–48)
HGB BLD-MCNC: 11.7 G/DL (ref 12–16)
LYMPHOCYTES # BLD: 1 K/UL (ref 1–5.1)
LYMPHOCYTES NFR BLD: 10.9 %
MCH RBC QN AUTO: 27 PG (ref 26–34)
MCHC RBC AUTO-ENTMCNC: 32.9 G/DL (ref 31–36)
MCV RBC AUTO: 81.9 FL (ref 80–100)
MONOCYTES # BLD: 0.7 K/UL (ref 0–1.3)
MONOCYTES NFR BLD: 7.3 %
NEUTROPHILS # BLD: 7.6 K/UL (ref 1.7–7.7)
NEUTROPHILS NFR BLD: 81.7 %
PLATELET # BLD AUTO: 316 K/UL (ref 135–450)
PMV BLD AUTO: 7.2 FL (ref 5–10.5)
RBC # BLD AUTO: 4.32 M/UL (ref 4–5.2)
WBC # BLD AUTO: 9.3 K/UL (ref 4–11)

## 2023-03-18 PROCEDURE — G0378 HOSPITAL OBSERVATION PER HR: HCPCS

## 2023-03-18 PROCEDURE — 36415 COLL VENOUS BLD VENIPUNCTURE: CPT

## 2023-03-18 PROCEDURE — 97110 THERAPEUTIC EXERCISES: CPT

## 2023-03-18 PROCEDURE — 6360000002 HC RX W HCPCS: Performed by: STUDENT IN AN ORGANIZED HEALTH CARE EDUCATION/TRAINING PROGRAM

## 2023-03-18 PROCEDURE — 85025 COMPLETE CBC W/AUTO DIFF WBC: CPT

## 2023-03-18 PROCEDURE — 6370000000 HC RX 637 (ALT 250 FOR IP): Performed by: STUDENT IN AN ORGANIZED HEALTH CARE EDUCATION/TRAINING PROGRAM

## 2023-03-18 PROCEDURE — 2580000003 HC RX 258: Performed by: STUDENT IN AN ORGANIZED HEALTH CARE EDUCATION/TRAINING PROGRAM

## 2023-03-18 PROCEDURE — 97116 GAIT TRAINING THERAPY: CPT

## 2023-03-18 PROCEDURE — 99024 POSTOP FOLLOW-UP VISIT: CPT | Performed by: PHYSICIAN ASSISTANT

## 2023-03-18 PROCEDURE — 6370000000 HC RX 637 (ALT 250 FOR IP): Performed by: SPECIALIST/TECHNOLOGIST

## 2023-03-18 PROCEDURE — 97535 SELF CARE MNGMENT TRAINING: CPT

## 2023-03-18 PROCEDURE — 97530 THERAPEUTIC ACTIVITIES: CPT

## 2023-03-18 PROCEDURE — 96376 TX/PRO/DX INJ SAME DRUG ADON: CPT

## 2023-03-18 RX ADMIN — CELECOXIB 100 MG: 100 CAPSULE ORAL at 08:16

## 2023-03-18 RX ADMIN — DEXAMETHASONE SODIUM PHOSPHATE 8 MG: 10 INJECTION INTRAMUSCULAR; INTRAVENOUS at 08:16

## 2023-03-18 RX ADMIN — METOPROLOL SUCCINATE 50 MG: 50 TABLET, EXTENDED RELEASE ORAL at 08:16

## 2023-03-18 RX ADMIN — Medication 10 ML: at 08:17

## 2023-03-18 RX ADMIN — BUSPIRONE HYDROCHLORIDE 15 MG: 5 TABLET ORAL at 08:16

## 2023-03-18 RX ADMIN — METHOCARBAMOL 500 MG: 500 TABLET ORAL at 08:16

## 2023-03-18 RX ADMIN — METHOCARBAMOL 500 MG: 500 TABLET ORAL at 15:06

## 2023-03-18 RX ADMIN — OXYCODONE HYDROCHLORIDE 5 MG: 5 TABLET ORAL at 05:38

## 2023-03-18 RX ADMIN — ESCITALOPRAM OXALATE 20 MG: 10 TABLET ORAL at 08:16

## 2023-03-18 RX ADMIN — PANTOPRAZOLE SODIUM 40 MG: 40 TABLET, DELAYED RELEASE ORAL at 05:38

## 2023-03-18 RX ADMIN — ASPIRIN 81 MG: 81 TABLET, COATED ORAL at 08:16

## 2023-03-18 RX ADMIN — ACETAMINOPHEN 325MG 650 MG: 325 TABLET ORAL at 10:18

## 2023-03-18 RX ADMIN — ACETAMINOPHEN 325MG 650 MG: 325 TABLET ORAL at 01:13

## 2023-03-18 RX ADMIN — POLYETHYLENE GLYCOL 3350 17 G: 17 POWDER, FOR SOLUTION ORAL at 08:16

## 2023-03-18 ASSESSMENT — PAIN DESCRIPTION - DESCRIPTORS
DESCRIPTORS: ACHING
DESCRIPTORS: ACHING

## 2023-03-18 ASSESSMENT — PAIN SCALES - GENERAL
PAINLEVEL_OUTOF10: 5
PAINLEVEL_OUTOF10: 5
PAINLEVEL_OUTOF10: 0
PAINLEVEL_OUTOF10: 5

## 2023-03-18 ASSESSMENT — PAIN DESCRIPTION - LOCATION
LOCATION: KNEE
LOCATION: KNEE
LOCATION: LEG
LOCATION: KNEE

## 2023-03-18 ASSESSMENT — PAIN DESCRIPTION - ORIENTATION
ORIENTATION: LEFT

## 2023-03-18 NOTE — PROGRESS NOTES
Occupational Therapy  Facility/Department: [facility] C5 - MED SURG/ORTHO  Daily Treatment Note  NAME: Christobal Shone  : 1980  MRN: [id number]    Date of Service: 3/18/2023    Discharge Recommendations:  24 hour supervision or assist  OT Equipment Recommendations  Equipment Needed: Yes  Mobility Devices: ADL Assistive Devices  ADL Assistive Devices: Transfer Tub Bench      Patient Diagnosis(es): The encounter diagnosis was S/P total knee arthroplasty, left. Assessment    Assessment: Pt continues to make good progress toward OT goals. Pt able to complete toilet transfer simulating home setup without grab bars as pt report safety frame unavailable for a few days, pt able to complete S level. Pt also demos S for tub transfers with TTB, TTB already delivered to pt after yesterday's OT session. Pt verbalizes understanding of all education. Continue OT per POC. Activity Tolerance: Patient tolerated treatment well  Discharge Recommendations: 24 hour supervision or assist  Equipment Needed: Yes  Mobility Devices: ADL Assistive Devices      Plan   Occupational Therapy Plan  Times Per Week: 4-6x     Restrictions  Restrictions/Precautions  Restrictions/Precautions: Weight Bearing;Surgical Protocols; General Precautions; Fall Risk  Lower Extremity Weight Bearing Restrictions  Left Lower Extremity Weight Bearing: Weight Bearing As Tolerated    Subjective   Subjective  Subjective: Pt in bed at approach, reports headache gone and improved sleep overnight. Pt agreeable to OT treatment. Orientation  Overall Orientation Status: Within Normal Limits  Orientation Level: Oriented X4    Pain: Pt reports pain in L knee, worse with mobility but not rated.     Cognition  Overall Cognitive Status: WNL        Objective    Vitals  Vitals:    23    BP: 132/82   Pulse: 90   Resp:    Temp:    SpO2: 96%          Bed Mobility Training  Bed Mobility Training: Yes  Rolling: Independent  Supine to Sit: Independent  Scooting: Independent    Balance  Sitting: Intact  Standing: Intact (with RW)  Standing - Static: Constant support;Good (RW)  Standing - Dynamic: Constant support;Good (RW)    Transfer Training  Transfer Training: Yes  Sit to Stand: Supervision  Stand to Sit: Supervision  Toilet Transfer: Supervision (without grab bars to simulate home setup)       ADL  Grooming: Supervision  Grooming Skilled Clinical Factors: in stance at sink  LE Dressing: Contact guard assistance; Increased time to complete  LE Dressing Skilled Clinical Factors: socks and shoes  Toileting: Supervision          Safety Devices  Type of Devices: Call light within reach;Nurse notified;Gait belt;Left in chair;Chair alarm in place; Patient at risk for falls; All fall risk precautions in place       Patient Education  Education Given To: Patient  Education Provided: Role of Therapy;Plan of Care;ADL Adaptive Strategies;Transfer Training; Fall Prevention Strategies; Equipment  Education Method: Demonstration;Verbal  Barriers to Learning: None  Education Outcome: Verbalized understanding;Demonstrated understanding    Goals  Short Term Goals  Time Frame for Short Term Goals: 1 week (3/30) unless noted - goals ongoing unless noted 3/18/23  Short Term Goal 1: Pt will complete toilet transfer with SBA-- GOAL MET, pt demos SBA with use of grab bars 3/17/23  Short Term Goal 2: Pt will complete LB dressing with CGA and use of AE PRN-- goal met for underwear and shorts 3/17, socks and shoes with increased time 3/18  Short Term Goal 3: Pt will complete 1-2 standing level ADLs with SBA-- GOAL MET, pt demos sink ADLs SBA 3/17/23  Short Term Goal 4: Pt will complete x15 BUE for strength and endurance required for ADLs-- ongoing  Patient Goals   Patient goals : \"I want to be able to go the bathroom IND\"       Therapy Time   Individual Concurrent Group Co-treatment   Time In 0840         Time Out 0935         Minutes MARCELLA Manzo/TARUN

## 2023-03-18 NOTE — PROGRESS NOTES
Department of Orthopedic Surgery  Physician Assistant   Progress Note    Subjective:       Systemic or Specific Complaints: pt states she feels improvement in pain and functional status. Was able to ambulate about the room a few times with therapy. Pt's mother at bedside. Patient's mother states she did much better with physical therapy today. The recliner has been delivered to her house  Objective:     Patient Vitals for the past 24 hrs:   BP Temp Temp src Pulse Resp SpO2   03/18/23 0850 132/82 -- -- 90 -- 96 %   03/18/23 0812 109/70 98.3 °F (36.8 °C) Oral 68 18 --   03/18/23 0111 130/83 97.6 °F (36.4 °C) Oral 68 18 96 %   03/17/23 2039 103/64 98.5 °F (36.9 °C) Oral 71 18 96 %   03/17/23 1700 134/86 -- -- 75 -- 94 %   03/17/23 1540 111/75 97.6 °F (36.4 °C) Oral 85 16 94 %   03/17/23 1355 114/74 -- -- 85 -- 90 %       General: alert, appears stated age, cooperative, and no distress   Wound: Wound clean and dry no evidence of infection. , No Erythema, No Drainage, and Positive for Edema   Motion: Painful range of Motion in affected extremity, seated in chair, knee to 80 degrees flexion   DVT Exam: No evidence of DVT seen on physical exam.  No cords or calf tenderness. No significant calf/ankle edema. Additional exam:  Patient sitting up in chair at time of interview, left knee propped up on footstool.    Mepilex clean, dry, intact  Left leg at neutral alignment  Swelling difficult to appreciate secondary to body habitus  Thigh and calf compartments soft and compressible, nontender to palpation  EHL, FHL, gastroc, anterior tib motor intact  Sensation intact to light touch  DP and PT pulses 2+      Data Review  CBC:   Lab Results   Component Value Date/Time    WBC 9.3 03/18/2023 06:59 AM    RBC 4.32 03/18/2023 06:59 AM    HGB 11.7 03/18/2023 06:59 AM    HCT 35.4 03/18/2023 06:59 AM     03/18/2023 06:59 AM       Renal:   Lab Results   Component Value Date/Time     03/16/2023 05:50 AM    K 4.6 03/16/2023 05:50 AM    CL 99 03/16/2023 05:50 AM    CO2 22 03/16/2023 05:50 AM    BUN 8 03/16/2023 05:50 AM    CREATININE 0.8 03/16/2023 05:50 AM    GLUCOSE 162 03/16/2023 05:50 AM    CALCIUM 8.9 03/16/2023 05:50 AM        EBL less than 50 mL    Assessment:      left total knee arthroplasty, postop day 3. Date of surgery 9/37/7234 by Dr. Alexa Carpenter:      1: Weightbearing as tolerated to the left lower extremity. Limit knee range of motion from 0 to 90 degrees for the first 2 weeks postop, gentle range of motion with therapy encouraged  2:  Continue Deep venous thrombosis prophylaxis-aspirin 81 mg twice daily by mouth for 1 month  3:  Continue Pain Control  4: PT/OT eval recommending 24 hour supervision or assist, Home with Home health PT, pt/family wants immediate home PT for mobility training/home safety eval, okay with ortho team, orders placed for [facility]. DME ordered- TTB, toilet frame, walker  Pt has tub/ shower unit and will require TTB to maximize safety and IND during shower transfers. Pt would also benefit from a toilet seat frame with arms to decrease risk of fall and maximize safety during STS from standard toilet height. Patient requires walker to complete ADLs that could not be done without a crutch or cane.   5: 2 doses IV Ancef completed postoperatively  6: Discharge today  Kim Chacko PA-C

## 2023-03-18 NOTE — CARE COORDINATION
CASE MANAGEMENT DISCHARGE SUMMARY      Discharge to: home with [facility]    IMM given: (date)     New Durable Medical Equipment ordered/agency:     Transportation:    Family/car: private       Confirmed discharge plan with: RN,[facility]     Patient: yes/no     Family:  yes/no    Name: Contact number:     Facility/Agency, name:  [facility] faxed [phone]   Phone number for report to facility:      RN, name: Cl Richardson    Note: Discharging nurse to complete [facility], reconcile [facility], and place final copy with patient's discharge packet. RN to ensure that written prescriptions for  Level II medications are sent with patient to the facility as per protocol.

## 2023-03-18 NOTE — PROGRESS NOTES
Physical Therapy  Facility/Department: [facility] C5 - MED SURG/ORTHO  Daily Treatment Note  NAME: Payal Ruby  : 1980  MRN: [id number]    Date of Service: 3/18/2023    Discharge Recommendations:  24 hour supervision or assist, Home with Home health PT   PT Equipment Recommendations  Equipment Needed: No (RW delivered to room already)    AM-PAC Basic Mobility - Inpatient   How much help is needed turning from your back to your side while in a flat bed without using bedrails?: None  How much help is needed moving from lying on your back to sitting on the side of a flat bed without using bedrails?: None  How much help is needed moving to and from a bed to a chair?: A Little  How much help is needed standing up from a chair using your arms?: A Little  How much help is needed walking in hospital room?: A Little  How much help is needed climbing 3-5 steps with a railing?: A Little  AM-[facility] Inpatient Mobility Raw Score : 20  AM-PAC Inpatient T-Scale Score : 47.67  Mobility Inpatient CMS 0-100% Score: 35.83  Mobility Inpatient CMS G-Code Modifier : CJ     Patient Diagnosis(es): The encounter diagnosis was S/P total knee arthroplasty, left. Assessment   Assessment: Pt progressing welat this point and demos mod I for bed mobility, supervison for STS transfers with RW and during gait 125' X 2. Pt trialed steps with B handrails needing CGA/SBA on 3 steps. Pt is recommended for con't skilled PT and home with 24 hr S at D/C and HHPT.   Activity Tolerance: Patient tolerated treatment well  Equipment Needed: No (RW delivered to room already)     Plan    Physcial Therapy Plan  General Plan: 2 times a day 7 days a week  Specific Instructions for Next Treatment: Progress ther ex and mobility as tolerated, curb step  Current Treatment Recommendations: Strengthening;ROM;Balance training;Functional mobility training;Transfer training;Gait training;Stair training;Home exercise program;Safety education & training;Patient/Caregiver education & training;Equipment evaluation, education, & procurement; Therapeutic activities     Restrictions  Restrictions/Precautions  Restrictions/Precautions: Weight Bearing, Surgical Protocols, General Precautions, Fall Risk  Required Braces or Orthoses?: No  Lower Extremity Weight Bearing Restrictions  Left Lower Extremity Weight Bearing: Weight Bearing As Tolerated  Position Activity Restriction  Other position/activity restrictions: Currently on 0.5L O2 post-operatively     Subjective    Subjective  Subjective: Pt agreeable, no pain at rest  Pain: Pt reports pain in L knee, worse with mobility but not rated. Orientation  Overall Orientation Status: Within Normal Limits  Orientation Level: Oriented X4  Cognition  Overall Cognitive Status: WNL     Objective   Vitals  Heart Rate: 90  BP: 132/82  MAP (Calculated): 99  SpO2: 96 %  Bed Mobility Training  Bed Mobility Training: Yes  Rolling: Independent  Supine to Sit: Independent  Scooting: Independent  Balance  Sitting: Intact  Standing: Intact (with RW)  Standing - Static: Constant support;Good (RW)  Standing - Dynamic: Constant support;Good (RW)  Transfer Training  Transfer Training: Yes  Sit to Stand: Supervision  Stand to Sit: Supervision  Toilet Transfer: Supervision (without grab bars to simulate home setup)  Gait Training  Gait Training: Yes  Left Side Weight Bearing: As tolerated  Gait  Overall Level of Assistance: Modified independent  Speed/Molly: Slow;Shuffled  Gait Abnormalities: Antalgic;Decreased step clearance;Trunk sway increased  Distance (ft): 125 Feet (X 2)  Assistive Device: Walker, rolling;Gait belt  Rail Use: Both  Stairs - Level of Assistance: Stand-by assistance;Contact-guard assistance  Number of Stairs Trained: 3     PT Exercises  Exercise Treatment: 1 x 15 LLE: heel slides (with towel underneath LLE. AROM range ~-2-70 degrees). 1 x 15 LLE long arc quads. , supine exs: AP, QS, GS X 15     Safety Devices  Type of Devices: Call light within reach;Nurse notified;Gait belt;Left in chair;Chair alarm in place; Patient at risk for falls; All fall risk precautions in place       Goals  Short Term Goals  Time Frame for Short Term Goals: 2 days, 3/18/23 (unless otherwise specified)  Short Term Goal 1: Pt will transfer supine <-> sit with supervision  - MET 3/17 3/17 new goal complete bed mobility with mod I.; 3/18 met  Short Term Goal 2: Pt will transfer sit <-> stand and bed>chair using RW with supervision 3/17 goal met. New goal complete transfers with mod I.; 3/18 met  Short Term Goal 3: Pt will ambulate x 100 feet using RW with supervision 3/17 goal met. New goal: ambulate 150 feet with RW with supervision. ; 3/18 125' with RW mod I  Short Term Goal 4: By 3/17/23: Pt will tolerate 12-15 reps appropriate LLE ther ex for ROM/strengthening   - MET 3/17  Short Term Goal 5: Pt will ambulate up/down 6\" curb step using RW with CGA/SBA x 1 3/17 Patient said that she has 2 entrances into her apartment. One entrance has 1 step to enter without any rails. The other entrance requires her to ambulate about 50-75 feet from her parking spot and then enter her home without any stairs. Patient said that since she is more ambulatory now she is no longer interested in attempting her stair climbing goal since the entrance without stairs is safer. Patient's stair/curb goal has been discontinued. Patient Goals   Patient Goals : \"To be able to walk around my apartment (distances of 40 feet) using my walker without help from my family (SBA or less)\" by 3/17/23 - MET 3/17    Education  Patient Education  Education Given To: Patient; Family  Education Provided: Role of Therapy;Plan of Care;Home Exercise Program;Precautions;Transfer Training;Family Education;Equipment; Fall Prevention Strategies  Education Provided Comments: importance of mobility, d/c recommendations and options, equipment recs, safety with car transfers and safety modifications  Education Method: Demonstration;Verbal  Barriers to Learning: None  Education Outcome: Verbalized understanding;Demonstrated understanding    Therapy Time   Individual Concurrent Group Co-treatment   Time In 0740         Time Out 0840         [facility]

## 2023-03-20 ENCOUNTER — CARE COORDINATION (OUTPATIENT)
Dept: CASE MANAGEMENT | Age: 43
End: 2023-03-20

## 2023-03-20 ENCOUNTER — TELEPHONE (OUTPATIENT)
Dept: ORTHOPEDIC SURGERY | Age: 43
End: 2023-03-20

## 2023-03-20 NOTE — TELEPHONE ENCOUNTER
Spoke with pt, doing well. Incision status: No drainage or redness    Edema/Swelling/Teds: Swelling is about the same, using ice machine and has been elevating    Pain level and status: Managing really well. Use of pain medications: Using as needed as prescribed. Blood thinner: ASA 81mg BID with no issues. Bowels: Taking at stool softener. Home Care Agency active: Pt states someone is due out today. Outpatient therapy: NA    Do you have all of your medications: Yes    Changes in medications: no    Instructed pt to call Nurse Navigator or surgeon's office with any questions or concerns.      Follow up appointments:    Future Appointments   Date Time Provider Cornel Gleason   3/28/2023 88:06 AM Genie Felty, MD [facility]

## 2023-03-20 NOTE — CARE COORDINATION
[address] Care Transitions Initial Follow Up Call    Call within 2 business days of discharge: Yes    Patient Current Location:  Home: [address] [place] [address]  [address]  [address]    Care Transition Nurse contacted the patient by telephone to perform post hospital discharge assessment. Verified name and  with patient as identifiers. Provided introduction to self, and explanation of the Care Transition Nurse role. Patient: Viki Maldonado Patient : 1980   MRN: [id number]  Reason for Admission: s/p total knee, left  Discharge Date: 3/18/23 RARS: No data recorded    Last Discharge [address]       Date Complaint Diagnosis Description Type Department Provider    3/15/23  S/P total knee arthroplasty, left Admission (Discharged) Priya Avila MD            Was this an external facility discharge? No Discharge Facility:     Challenges to be reviewed by the provider   Additional needs identified to be addressed with provider: No  none               Method of communication with provider: none. Spoke to [employer] and [facility] was completed 3/18/23    Patient answered call and verified . Patient pleasant and agreeable to transition call. Stated she is doing well since discharge from services. Stated that [facility] Saturday and exercising as directed. Medications reviewed and confirmed that she is taking all as directed. Continues to ice and elevate knee. Incision is healing well. Denied any acute needs at present time. Agreeable to f/u calls. Educated on the use of urgent care or physicians 24 hr access line if assistance is needed after hours. Care Transition Nurse reviewed discharge instructions with patient who verbalized understanding. The patient was given an opportunity to ask questions and does not have any further questions or concerns at this time. Were discharge instructions available to patient? Yes.  Reviewed appropriate site of care based on symptoms and resources

## 2023-03-21 ENCOUNTER — TELEPHONE (OUTPATIENT)
Dept: ORTHOPEDIC SURGERY | Age: 43
End: 2023-03-21

## 2023-03-21 NOTE — DISCHARGE SUMMARY
being given increased dosage/quantity of opoid pain medication in excess of OSMB guidelines which noted a 30 MED daily of opioids due to the fact that he/she has undergone major orthopaedic surgery as outlined in rule 4731-11-13. Dosages and further duration of the pain medication will be re-evaluated at her post op visit in 2 weeks. Patient was educated on dosing expectations and limits of prescribing as a result of the new [facility] rules enacted August 31, 2017. Please also note that this is not the initial opoid prescription issued to this patient but a continuation of medication utilized during the hospital admission as noted in the medical record. OARRS report has also been utilized to screen for any abuse history or suspicious activity as outlined in [place]. All efforts have been taken to prevent abuse potential and misuse of opioid medications including education, screening, and close clinical follow up.

## 2023-03-21 NOTE — TELEPHONE ENCOUNTER
General Question     Subject: HOME HEALTH CARE  Patient and /or Facility Request: [facility]  Contact Number: [phone]       VERBAL ORDERS FOR HOME CARE FOR PT ?

## 2023-03-25 ENCOUNTER — OFFICE VISIT (OUTPATIENT)
Dept: ORTHOPEDIC SURGERY | Age: 43
End: 2023-03-25

## 2023-03-25 VITALS — HEIGHT: 63 IN | WEIGHT: 273 LBS | BODY MASS INDEX: 48.37 KG/M2

## 2023-03-25 DIAGNOSIS — M25.562 LEFT KNEE PAIN, UNSPECIFIED CHRONICITY: Primary | ICD-10-CM

## 2023-03-25 RX ORDER — METHOCARBAMOL 750 MG/1
750 TABLET, FILM COATED ORAL 3 TIMES DAILY
Qty: 30 TABLET | Refills: 0 | Status: SHIPPED | OUTPATIENT
Start: 2023-03-25 | End: 2023-04-04

## 2023-03-25 NOTE — PROGRESS NOTES
and reviewed in office. Impression: Stable post op xray. No signs of loosening or malposition. Assessment: Patient is a 37 y.o. female with anterior knee pain 10 day SP left TKA with Dr. Luna Soliz. I do not suspect any post op complications. I feel her symptoms are most likely related to normal post op course/muscle spasms. Impression:      Office Procedures:  Orders Placed This Encounter   Medications    methocarbamol (ROBAXIN-750) 750 MG tablet     Sig: Take 1 tablet by mouth 3 times daily for 10 days     Dispense:  30 tablet     Refill:  0     Orders Placed This Encounter   Procedures    XR KNEE LEFT (3 VIEWS)     Standing Status:   Future     Number of Occurrences:   1     Standing Expiration Date:   4/25/2023       Plan:  Pertinent imaging was reviewed. The etiology, natural history, and treatment options for the disorder were discussed. We believe patient is a candidate for Conservative treatment, including the following:   Increased Robaxin to 750 mg three times daily. Continue PO course per Dr. Parish Saldivar orders. Follow up as previously scheduled. All the patient's questions were answered while in the clinic. The patient is understanding of all instructions and agrees with the plan. Follow up in: No follow-ups on file. Sincerely,  Nery Lemon      03/25/23  10:07 AM       This dictation was performed with a verbal recognition program (DRAGON) and it was checked for errors. It is possible that there are still dictated errors within this office note. If so, please bring any errors to my attention for an addendum. All efforts were made to ensure that this office note is accurate.

## 2023-03-27 ENCOUNTER — TELEPHONE (OUTPATIENT)
Dept: FAMILY MEDICINE CLINIC | Age: 43
End: 2023-03-27

## 2023-03-27 DIAGNOSIS — I10 ESSENTIAL HYPERTENSION: ICD-10-CM

## 2023-03-27 NOTE — TELEPHONE ENCOUNTER
Delano Thomas with [facility] home care calling to report patient having episodes of hypertension. States she is post knee surgery and is having a lot of pain and spasms, Delano Thomas is also calling ortho with BP readings. States this morning 2 BP readings of   125/107   141/121     [address]

## 2023-03-27 NOTE — TELEPHONE ENCOUNTER
Is she having CP, SOB, leg swelling, orthopnea, needing to sleep with extra pillows? Still taking Metoprolol? We can increase temporarily but if she is hurting more than likely is pain related.

## 2023-03-27 NOTE — TELEPHONE ENCOUNTER
Patient called. She says she has leg swelling, she is not sleeping in her bed, in a lot of pain, she is taking metoprolol. Pulse rate is 130 at rest, asking if metoprolol willhelp with the pulse rate.

## 2023-03-28 ENCOUNTER — CARE COORDINATION (OUTPATIENT)
Dept: CASE MANAGEMENT | Age: 43
End: 2023-03-28

## 2023-03-28 ENCOUNTER — OFFICE VISIT (OUTPATIENT)
Dept: ORTHOPEDIC SURGERY | Age: 43
End: 2023-03-28

## 2023-03-28 VITALS — BODY MASS INDEX: 48.37 KG/M2 | WEIGHT: 273 LBS | HEIGHT: 63 IN

## 2023-03-28 DIAGNOSIS — R52 PAIN: Primary | ICD-10-CM

## 2023-03-28 PROCEDURE — 99024 POSTOP FOLLOW-UP VISIT: CPT | Performed by: STUDENT IN AN ORGANIZED HEALTH CARE EDUCATION/TRAINING PROGRAM

## 2023-03-28 RX ORDER — METOPROLOL SUCCINATE 100 MG/1
100 TABLET, EXTENDED RELEASE ORAL DAILY
Qty: 28 TABLET | Refills: 1 | Status: SHIPPED | OUTPATIENT
Start: 2023-03-28

## 2023-03-28 RX ORDER — DIAZEPAM 5 MG/1
5 TABLET ORAL EVERY 8 HOURS PRN
Qty: 4 TABLET | Refills: 0 | Status: SHIPPED | OUTPATIENT
Start: 2023-03-28 | End: 2023-04-07

## 2023-03-28 RX ORDER — METHYLPREDNISOLONE 4 MG/1
TABLET ORAL
Qty: 1 KIT | Refills: 0 | Status: SHIPPED | OUTPATIENT
Start: 2023-03-28 | End: 2023-04-03

## 2023-03-28 NOTE — TELEPHONE ENCOUNTER
The Metoprolol will help with her Heart Rate and her blood pressure. I'm hoping that as she heals her pain will improve and she will not need to be on a higher dose. If she is not improving with the increased dose of the Methocarbamol ordered by Ortho last week then she should call them back and see if they can change things.

## 2023-03-28 NOTE — TELEPHONE ENCOUNTER
Patient notified. Patient wanted to just ask to make sure you did not want to increase her blood pressure medication?  Please advise

## 2023-03-28 NOTE — PROGRESS NOTES
History: 36 y/o F presents for first follow-up after left total knee on March 15, 2023. She has been working with home physical therapist and still using a walker. She has been off the oxycodone. She has been having muscle spasms to the medial quad which is her main complaint at this time. Things seem to be going well otherwise. Exam: Incisions clean dry and intact without erythema or drainage. Range of motion is about 5 degrees short of full extension to 120. No ligamentous instability or neurovascular compromise. Imaging: Post op imaging reviewed    Assessment: 36 y/o F 2 weeks s/p L TKA, doing well, having muscle spasms and persistent swelling. Plan: I prescribed Valium to take instead of Robaxin to help out with the spasms and sleep issues. Also prescribed Medrol Dosepak to help out with her persistent postop swelling and feelings of tightness. She is scheduled to start outpatient physical therapy on April 4 which I think will help a lot also. She will continue her Celebrex and Tylenol for pain control and aspirin for DVT prophylaxis and she will resume the Robaxin after the 4-day course of Valium. Follow-up in 2 to 3 weeks for range of motion check.     Varsha Early MD

## 2023-03-28 NOTE — CARE COORDINATION
REHABILITATION [facility] Care Transitions Follow Up Call    Patient: Ricky Robert  Patient : 1980   MRN: [id number]  Reason for Admission: s/p total knee, left  Discharge Date: 3/18/23 RARS: No data recorded    Attempted to reach patient via phone for transition call. VM left stating purpose of call along with my contact information requesting a return call. Notes reviewed and patient actively engaged with home care provider and PCP for HTN and pain management. CTN will re-attempt transition call at later time.     Follow Up  Future Appointments   Date Time Provider Cornel Gleason   2023  9:15 AM Nicole Schultz PT [facility] PT [facility] HOD        Care Transitions Subsequent and Final Call    Subsequent and Final Calls  Care Transitions Interventions  Other Interventions:             Carson Lopez RN

## 2023-03-28 NOTE — TELEPHONE ENCOUNTER
Patient notified, will need updated script sent to [address], states she gets the premade blister packs and cannot double them with those.

## 2023-03-29 ENCOUNTER — TELEPHONE (OUTPATIENT)
Dept: ORTHOPEDIC SURGERY | Age: 43
End: 2023-03-29

## 2023-03-29 NOTE — TELEPHONE ENCOUNTER
Attempted to contact pt, left voicemail with purpose and call back number. Yue Cortes  Orthopedic Nurse Navigator  Phone number: [phone]    Follow up appointments:    Future Appointments   Date Time Provider Cornel Gleason   4/4/2023  9:15 AM Addie Garcia, PT [facility] SAR PT [address]   4/14/2023 93:26 AM Christopher Cooper MD AND ORTHO [facility]     Signed off from pt. Instructed pt to call RN or Surgeon's office with any issues or concerns.

## 2023-03-29 NOTE — TELEPHONE ENCOUNTER
Spoke with pharamcy,   Diazepam is to be taken 1qhs x4 then to resume Robaxin after the Diazepam. Medrol Dosepak was prescribed to help with post op swelling.      Laya Kumar from the pharmacy will call patient to rely instructions

## 2023-03-29 NOTE — TELEPHONE ENCOUNTER
The script for diazepam 1 tab q 8 hours for anxiety or sleep, take prior to MRI, but mom is at pharmacy to  and states it should be for muscle spasms at bedtime. Every 8 hours or 1 at bedtime?     [facility] Pharmacy:  [phone] Yes

## 2023-03-31 ENCOUNTER — CARE COORDINATION (OUTPATIENT)
Dept: CASE MANAGEMENT | Age: 43
End: 2023-03-31

## 2023-03-31 NOTE — CARE COORDINATION
REHABILITATION [facility] Care Transitions Follow Up Call    Patient Current Location:  Home: [address] [place] [address]  [address]  [address] [facility]    Care Transition Nurse contacted the patient by telephone to follow up after admission. Verified name and  with patient as identifiers. Patient: Sandra Jeffers  Patient : 1980   MRN: [id number]  Reason for Admission:  s/p total knee, left  Discharge Date: 3/18/23 RARS: No data recorded    Needs to be reviewed by the provider   Additional needs identified to be addressed with provider: No  none             Method of communication with provider: none. Patient answered call and verified . Patient pleasant and agreeable to transition call. Patient doing well, but still having some swelling in left post op knee. Patient taking medrol dose pack as directed and added valium to regimen last night. Patient has noticed a decrease in muscle spasms, but having difficulty sleeping at night. CTN reviewed different positions for comfort, importance of ice and elevation, and taking breaks between therapy sessions for recovery. Stated understanding. Follow up appt for outpatient PT to start next week. Confirmed transportation for visit. Denied any acute needs at present time. Agreeable to f/u calls. Educated on the use of urgent care or physicians 24 hr access line if assistance is needed after hours. Addressed changes since last contact:  none  Discussed follow-up appointments. If no appointment was previously scheduled, appointment scheduling offered: Yes. Is follow up appointment scheduled within 7 days of discharge? Yes.     Follow Up  Future Appointments   Date Time Provider Cornel Gleason   2023  9:15 AM Ramila Carroll PT [facility]   2023 76:70 AM Emma Welsh MD AND ORTHO [facility]     Non-[facility] follow up appointment(s):     Care Transition Nurse reviewed medical action plan with patient and discussed any barriers to care and/or understanding of plan of

## 2023-04-04 ENCOUNTER — HOSPITAL ENCOUNTER (OUTPATIENT)
Dept: PHYSICAL THERAPY | Age: 43
Setting detail: THERAPIES SERIES
End: 2023-04-04
Payer: COMMERCIAL

## 2023-04-06 ENCOUNTER — HOSPITAL ENCOUNTER (OUTPATIENT)
Dept: PHYSICAL THERAPY | Age: 43
Setting detail: THERAPIES SERIES
Discharge: HOME | End: 2023-04-06
Payer: COMMERCIAL

## 2023-04-06 PROCEDURE — 97110 THERAPEUTIC EXERCISES: CPT

## 2023-04-06 PROCEDURE — 97161 PT EVAL LOW COMPLEX 20 MIN: CPT

## 2023-04-06 PROCEDURE — 97140 MANUAL THERAPY 1/> REGIONS: CPT

## 2023-04-06 NOTE — PLAN OF CARE
progression of HEP. HEP instruction: Refer to [address] access code and exercises on the 1st visit treatment note      GOALS:  Patient stated goal: To be able to safely ascend and descend stairs     Therapist goals for Patient:   Short Term Goals: To be achieved in: 2 weeks  1. Independent in HEP and progression per patient tolerance, in order to prevent re-injury. [] Progressing: [] Met: [] Not Met: [] Adjusted   2. Patient will have a decrease in pain of NRPS to </= 3-4/10 facilitate improvement in movement, function, and ADLs as indicated by Functional Deficits. [] Progressing: [] Met: [] Not Met: [] Adjusted     Long Term Goals: To be achieved in: 12 weeks  Functional Scale Test LEFS  score will be </= 20% to assist with reaching prior level of function. [] Progressing: [] Met: [] Not Met: [] Adjusted   2. Patient will demonstrate increased AROM to 0 °  to 125 °  to allow for proper joint functioning as indicated by patients Functional Deficits. [] Progressing: [] Met: [] Not Met: [] Adjusted   3. Patient will demonstrate an increase in Strength to L LE equal to R LE allow for proper functional mobility as indicated by patients Functional Deficits. [] Progressing: [] Met: [] Not Met: [] Adjusted   4. Patient will return to functional activities with NRPS of </= 2/10 at worst   [] Progressing: [] Met: [] Not Met: [] Adjusted   5.  Pt will ambulated with proper heel to toe gait pattern with least restrictive AD to no AD to return to PLOF. (patient specific functional goal)    [] Progressing: [] Met: [] Not Met: [] Adjusted       Electronically signed by:  Zara Murillo, PT , MPT,ATC, cert DN

## 2023-04-06 NOTE — FLOWSHEET NOTE
[facility]   [address] [facility] [address] [facility], [place], [address]  Phone: [phone], [phone]    Physical Therapy Treatment Note/ Progress Report:     Date:  2023    Patient Name:  Blaire Hamm    :  1980  MRN: [id number]  Restrictions/Precautions:    Medical/Treatment Diagnosis Information:  Diagnosis: Left Knee OA (M17.12) s/p TKA 3/15/2023   Treatment Diagnosis: Muscle Weakness (M62.81); Left Knee (M25.562)               Insurance/Certification information:  PT Insurance Information: [facility]  Physician Information:  Referring Provider: Dr. Gt Diaz  Has the plan of care been signed (Y/N):        []  Yes  [x]  No     Date of Patient follow up with Physician: 2023    Is this a Progress Report:     []  Yes  [x]  No      If Yes:  Date Range for reporting period:  Initial Eval: 2023  Beginnin2023 --- Endin2023    Progress report will be due (10 Rx or 30 days whichever is less): 2023     Recertification will be due (POC Duration  / 90 days whichever is less): 2023      Visit # Insurance Allowable Auth Required   In Person Eval  Auth after evaluation   Check NV [x]  Yes     []  No    Tele Health -  []  Yes     []  No    Total Eval       Functional Test Score: LEFS :85 % (Total: )  Date assessed: 2023      Latex Allergy:  [x]NO      []YES    Preferred Language for Healthcare:   [x]English       []other:    Pain level:  5/10     SUBJECTIVE:  See eval    OBJECTIVE: See eval  Observation:   Test measurements:      RESTRICTIONS/PRECAUTIONS: CP, Anxiety,Depression, muscle spasms     Exercises/Interventions:   Therapeutic Ex (47751)  Therapeutic Activity (71200)  NMR re-education (32295) Sets/Reps Notes/CUES   Bike          Seated Quad Sets 10 x 10\"    Seated Marches 20 x     Seated HS Stretch 3 x 30\"    Seated Calf Stretch 3 x 30\"    Seated Hip Add Squeeze 20 x 5\"    Seated Hip Abd with LAQ 2 x 10         Standing Weight Shifts  20 x

## 2023-04-07 ENCOUNTER — CARE COORDINATION (OUTPATIENT)
Dept: CASE MANAGEMENT | Age: 43
End: 2023-04-07

## 2023-04-07 NOTE — CARE COORDINATION
[facility] Care Transitions Follow Up Call    Patient Current Location:  Home: [address] [place] [address]  [address]  [address] [facility]    Care Transition Nurse contacted the patient by telephone to follow up after admission. Verified name and  with patient as identifiers. Patient: Eran Zimmerman  Patient : 1980   MRN: [id number]  Reason for Admission:  s/p total knee, left  Discharge Date: 3/18/23 RARS: No data recorded    Needs to be reviewed by the provider   Additional needs identified to be addressed with provider: No  none             Method of communication with provider: none. Patient answered call and verified . Patient pleasant and agreeable to transition call. Addressed changes since last contact:  none  Discussed follow-up appointments. If no appointment was previously scheduled, appointment scheduling offered: Yes. Is follow up appointment scheduled within 7 days of discharge? Yes. Follow Up  Future Appointments   Date Time Provider Cornel Gleason   4/10/2023  9[facility]   2023  4:45 PM [facility] Antionette, PTA [facility]   2023 17:92 AM Adair Preston MD [facility]   2023  4:45 PM [facility] Antionette, [facility]   2023  9:45 AM Moisés Adan, [facility] PT Roberto Soto     [facility] follow up appointment(s):     Care Transition Nurse reviewed discharge instructions and medical action plan with patient and discussed any barriers to care and/or understanding of plan of care after discharge. Discussed appropriate site of care based on symptoms and resources available to patient including: PCP  Specialist. The patient agrees to contact the PCP office for questions related to their healthcare. Advance Care Planning:   not on file.      Patients top risk factors for readmission: functional physical ability  Interventions to address risk factors: Education of patient/family/caregiver/guardian to support self-management-

## 2023-04-11 ENCOUNTER — APPOINTMENT (OUTPATIENT)
Dept: PHYSICAL THERAPY | Age: 43
End: 2023-04-11
Payer: COMMERCIAL

## 2023-04-12 ENCOUNTER — APPOINTMENT (OUTPATIENT)
Dept: PHYSICAL THERAPY | Age: 43
End: 2023-04-12
Payer: COMMERCIAL

## 2023-04-14 ENCOUNTER — TELEPHONE (OUTPATIENT)
Dept: ORTHOPEDIC SURGERY | Age: 43
End: 2023-04-14

## 2023-04-17 ENCOUNTER — HOSPITAL ENCOUNTER (OUTPATIENT)
Dept: PHYSICAL THERAPY | Age: 43
Setting detail: THERAPIES SERIES
Discharge: HOME | End: 2023-04-17
Payer: COMMERCIAL

## 2023-04-17 PROCEDURE — 97140 MANUAL THERAPY 1/> REGIONS: CPT | Performed by: PHYSICAL THERAPY ASSISTANT

## 2023-04-17 PROCEDURE — 97110 THERAPEUTIC EXERCISES: CPT | Performed by: PHYSICAL THERAPY ASSISTANT

## 2023-04-17 NOTE — TELEPHONE ENCOUNTER
ADVISED TO CONTINUE FOR 2 WEEKS   FOR ROM CHECK AND DISCUSS AT THAT APPT.  VIRGIES    APPT CHANGED TO 5/2/2023 SHELBY

## 2023-04-17 NOTE — PROGRESS NOTES
[facility], [address] [place] [address] Brenda Balus, [address]  Phone: [phone], [phone]          Physical Therapy Treatment Note/ Progress Report:     Date:  2023    Patient Name:  nAge Vizcaino    :  1980  MRN: [id number]  Restrictions/Precautions:    Medical/Treatment Diagnosis Information:  Diagnosis: Left Knee OA (M17.12) s/p TKA 3/15/2023   Treatment Diagnosis: Muscle Weakness (M62.81); Left Knee (M25.562)               Insurance/Certification information:  PT Insurance Information: [facility]  Physician Information:  Referring Provider: Dr. Ghazala Grimes  Has the plan of care been signed (Y/N):        []  Yes  [x]  No     Date of Patient follow up with Physician: 2023    Is this a Progress Report:     []  Yes  [x]  No      If Yes:  Date Range for reporting period:  Initial Eval: 2023  Beginnin2023 --- Endin2023    Progress report will be due (10 Rx or 30 days whichever is less): 3/6/5676     Recertification will be due (POC Duration  / 90 days whichever is less): 2023      Visit # Insurance Allowable Auth Required   In Person Eval +3 24 visits 23-23 [x]  Yes     []  No    Tele Health -  []  Yes     []  No    Total Eval+3       Functional Test Score: LEFS :85 % (Total: )  Date assessed: 2023      Latex Allergy:  [x]NO      []YES    Preferred Language for Healthcare:   [x]English       []other:    Pain level:  4/10     SUBJECTIVE:  MD pleased with bend - needs to work on the extension still. He wants her to stay on the muscle relaxer full time.       OBJECTIVE: See eval  Observation: Lacking 6 to 115   Test measurements:      RESTRICTIONS/PRECAUTIONS: CP, Anxiety,Depression, muscle spasms     Exercises/Interventions:   Therapeutic Ex (57227)  Therapeutic Activity (10398)  NMR re-education (41790) Sets/Reps Notes/CUES   Bike 5' L5         Supine on wedge  Quad Sets 20 x 5\"    Supine on wedge SLR  X20    Supine Marches 20 x

## 2023-04-20 ENCOUNTER — HOSPITAL ENCOUNTER (OUTPATIENT)
Dept: PHYSICAL THERAPY | Age: 43
Setting detail: THERAPIES SERIES
Discharge: HOME | End: 2023-04-20
Payer: COMMERCIAL

## 2023-04-20 ENCOUNTER — CARE COORDINATION (OUTPATIENT)
Dept: CASE MANAGEMENT | Age: 43
End: 2023-04-20

## 2023-04-20 DIAGNOSIS — R52 PAIN: ICD-10-CM

## 2023-04-20 PROCEDURE — 97110 THERAPEUTIC EXERCISES: CPT | Performed by: PHYSICAL THERAPY ASSISTANT

## 2023-04-20 PROCEDURE — 97140 MANUAL THERAPY 1/> REGIONS: CPT | Performed by: PHYSICAL THERAPY ASSISTANT

## 2023-04-20 RX ORDER — ERGOCALCIFEROL 1.25 MG/1
50000 CAPSULE ORAL WEEKLY
Qty: 4 CAPSULE | Refills: 0 | Status: SHIPPED | OUTPATIENT
Start: 2023-04-20

## 2023-04-20 NOTE — CARE COORDINATION
Advance Care Planning:   not on file. Patients top risk factors for readmission: functional physical ability  Interventions to address risk factors: Education of patient/family/caregiver/guardian to support self-management-     Offered patient enrollment in the Remote Patient Monitoring (RPM) program for in-home monitoring: Patient is not eligible for RPM program.     Care Transitions Subsequent and Final Call    Subsequent and Final Calls  Care Transitions Interventions  Other Interventions:             Care Transition Nurse provided contact information for future needs. Plan for follow-up call in 7-10 days based on severity of symptoms and risk factors.   Plan for next call: self management-     Eddie Hernandez RN

## 2023-04-20 NOTE — PROGRESS NOTES
Notes/CUES   Bike 5' L5         Supine on wedge  Quad Sets 20 x 5\"    Supine on wedge SLR  X20    Supine Marches 20 x     SAQ  / LAQ  X20 5\" hold ea     LS HS Stretch 3 x 30\"    LS Calf Stretch 3 x 30\"    Supine on Wedge Hip Add Squeeze    Supine on wedge ABD  Orange x20 5\" hold    Seated Hip Abd with LAQ 2 x 10    Seated EOB flexion stretch  5\" x20         Standing Weight Shifts  20 x     Standing HSC 2x10     Standing Marches 20 x with tactile cues to keep knee straight when [address] through leg     Standing HIp ABD  2x10 Left only    Standing Mini Squats  x20 Focusing on knee extension   FSU  6\" x15 Focusing on knee extension    LSU  X10 6\"  \"        Leg Press  60# x30                                  Manual Intervention (16529)     Patella Mobs 5'    DTM to hamstrings and ITBand 10'    PROM 10'                        [address] access code:   Access Code: [email or web address]  URL: [email or web address]  Date: 04/06/2023  Prepared by: Sumit Talavera    Exercises  - Seated Long Arc Quad  - 2-3 x daily - 7 x weekly - 2-3 sets - 10 reps  - Seated Quad Set  - 2-3 x daily - 7 x weekly - 1 sets - 10 reps - 5\" hold  - Seated Passive Knee Extension  - 1 x daily - 7 x weekly - 3 sets - 1 reps - 3' hold  - Seated Knee Flexion AAROM  - 2-3 x daily - 7 x weekly - 1 sets - 10 reps - 10\" hold  - Standing Weight Shift  - 1 x daily - 7 x weekly - 1 sets - 20 reps  - Standing March with Counter Support  - 1-2 x daily - 7 x weekly - 2 sets - 10 reps  - Standing Alternating Knee Flexion  - 1-2 x daily - 7 x weekly - 2 sets - 10 reps             Patient Education 10' Pt education with HEP and progression of PT along with compliance with HEP to aide with formal PT for optimal outcomes. Therapeutic Exercise and NMR EXR  [x] (83653) Provided verbal/tactile cueing for activities related to strengthening, flexibility, endurance, ROM for improvements in LE, proximal hip, and core control with self care, mobility, lifting, ambulation.   [x]

## 2023-04-21 ENCOUNTER — TELEPHONE (OUTPATIENT)
Dept: ORTHOPEDIC SURGERY | Age: 43
End: 2023-04-21

## 2023-04-21 RX ORDER — METHOCARBAMOL 500 MG/1
500 TABLET, FILM COATED ORAL 4 TIMES DAILY
Qty: 40 TABLET | Refills: 0 | Status: SHIPPED | OUTPATIENT
Start: 2023-04-21 | End: 2023-05-01

## 2023-04-25 ENCOUNTER — HOSPITAL ENCOUNTER (OUTPATIENT)
Dept: PHYSICAL THERAPY | Age: 43
Setting detail: THERAPIES SERIES
Discharge: HOME | End: 2023-04-25
Payer: COMMERCIAL

## 2023-04-25 PROCEDURE — 97140 MANUAL THERAPY 1/> REGIONS: CPT | Performed by: PHYSICAL THERAPY ASSISTANT

## 2023-04-25 PROCEDURE — 97110 THERAPEUTIC EXERCISES: CPT | Performed by: PHYSICAL THERAPY ASSISTANT

## 2023-04-25 NOTE — PROGRESS NOTES
[facility], [address] [place] [address], [facility]  Phone: [phone], [phone]          Physical Therapy Treatment Note/ Progress Report:     Date:  2023    Patient Name:  Jasmeet Amaral    :  1980  MRN: [id number]  Restrictions/Precautions:    Medical/Treatment Diagnosis Information:  Diagnosis: Left Knee OA (M17.12) s/p TKA 3/15/2023   Treatment Diagnosis: Muscle Weakness (M62.81); Left Knee (M25.562)               Insurance/Certification information:  PT Insurance Information: [facility]  Physician Information:  Referring Provider: Dr. Marilou Lucio  Has the plan of care been signed (Y/N):        []  Yes  [x]  No     Date of Patient follow up with Physician: 2023    Is this a Progress Report:     []  Yes  [x]  No      If Yes:  Date Range for reporting period:  Initial Eval: 2023  Beginnin2023 --- Endin2023    Progress report will be due (10 Rx or 30 days whichever is less): 6049     Recertification will be due (POC Duration  / 90 days whichever is less): 2023      Visit # Insurance Allowable Auth Required   In Person Eval +4 24 visits 23-23 [x]  Yes     []  No    Tele Health -  []  Yes     []  No    Total Eval+4       Functional Test Score: LEFS :85 % (Total: )  Date assessed: 2023      Latex Allergy:  [x]NO      []YES    Preferred Language for Healthcare:   [x]English       []other:    Pain level:  3/10     SUBJECTIVE:  Feeling more steady on her feet.       OBJECTIVE: See eval  Observation: Lacking 6 to 115   Test measurements:      RESTRICTIONS/PRECAUTIONS: CP, Anxiety,Depression, muscle spasms     Exercises/Interventions:   Therapeutic Ex (17161)  Therapeutic Activity (17809)  NMR re-education (17308) Sets/Reps Notes/CUES   Bike 5' L5         Supine on wedge  Quad Sets 20 x 5\"    Supine on wedge SLR  3x10    Supine Marches 20 x     SAQ  / LAQ  X20 5\" hold ea     LS HS Stretch 3 x 30\"    LS Calf Stretch 3 x 30\"

## 2023-04-27 ENCOUNTER — HOSPITAL ENCOUNTER (OUTPATIENT)
Dept: PHYSICAL THERAPY | Age: 43
Setting detail: THERAPIES SERIES
Discharge: HOME | End: 2023-04-27
Payer: COMMERCIAL

## 2023-04-27 PROCEDURE — 97140 MANUAL THERAPY 1/> REGIONS: CPT

## 2023-04-27 PROCEDURE — 97112 NEUROMUSCULAR REEDUCATION: CPT

## 2023-04-27 PROCEDURE — 97110 THERAPEUTIC EXERCISES: CPT

## 2023-04-27 NOTE — FLOWSHEET NOTE
[facility], [address] [place] [address], [address]  Phone: [phone], [phone]          Physical Therapy Treatment Note/ Progress Report:     Date:  2023    Patient Name:  Jose Cruz Orta    :  1980  MRN: [id number]  Restrictions/Precautions:    Medical/Treatment Diagnosis Information:  Diagnosis: Left Knee OA (M17.12) s/p TKA 3/15/2023   Treatment Diagnosis: Muscle Weakness (M62.81);  Left Knee (M25.562)               Insurance/Certification information:  PT Insurance Information: [facility]  Physician Information:  Referring Provider: Dr. Anna Alcocer  Has the plan of care been signed (Y/N):        []  Yes  [x]  No     Date of Patient follow up with Physician: 2023    Is this a Progress Report:     []  Yes  [x]  No      If Yes:  Date Range for reporting period:  Initial Eval: 2023  Beginnin2023 --- Endin2023    Progress report will be due (10 Rx or 30 days whichever is less): 6856     Recertification will be due (POC Duration  / 90 days whichever is less): 2023      Visit # Insurance Allowable Auth Required   In Person Eval +6 24 visits 23-23 [x]  Yes     []  No    Tele Health -  []  Yes     []  No    Total Eval+6       Functional Test Score: LEFS :85 % (Total: )  Date assessed: 2023      Latex Allergy:  [x]NO      []YES    Preferred Language for Healthcare:   [x]English       []other:    Pain level:  3/10     SUBJECTIVE:  Pt reports no new complaints specially,does state she is tired from not sleeping well     OBJECTIVE: See eval  Observation: Lacking 7 to 115 2023  Test measurements:      RESTRICTIONS/PRECAUTIONS: CP, Anxiety,Depression, muscle spasms     Exercises/Interventions:   Therapeutic Ex (95770)  Therapeutic Activity (67537)  NMR re-education (78097) Sets/Reps Notes/CUES   Bike 5' L5         Supine on wedge  Quad Sets 20 x 5\"    Supine on wedge SLR  3x10    Supine Marches 20 x     SAQ  / LAQ  X20 5\"

## 2023-04-28 ENCOUNTER — CARE COORDINATION (OUTPATIENT)
Dept: CASE MANAGEMENT | Age: 43
End: 2023-04-28

## 2023-04-28 NOTE — CARE COORDINATION
[facility] Care Transitions Follow Up Call    Patient: Елена Moser  Patient : 1980   MRN: [id number]  Reason for Admission: s/p left knee replacement   Discharge Date: 3/18/23 RARS: No data recorded    Attempted to reach patient via phone for transition call. VM left stating purpose of call along with my contact information requesting a return call.     .    Follow Up  Future Appointments   Date Time Provider Cornel Gleason   5/3/2023  4:45 PM Angel Pham PTA [facility] PT [facility]   2023  4:45 PM Angel Pham PTA [facility]   2023 82:80 AM Mynor Joe MD [facility]      Care Transitions Subsequent and Final Call    Subsequent and Final Calls  Care Transitions Interventions  Other Interventions:             Devi Enriquez RN

## 2023-05-01 RX ORDER — ERGOCALCIFEROL 1.25 MG/1
50000 CAPSULE ORAL WEEKLY
Qty: 4 CAPSULE | Refills: 0 | Status: SHIPPED | OUTPATIENT
Start: 2023-05-01

## 2023-05-03 ENCOUNTER — HOSPITAL ENCOUNTER (OUTPATIENT)
Dept: PHYSICAL THERAPY | Age: 43
Setting detail: THERAPIES SERIES
Discharge: HOME | End: 2023-05-03
Payer: COMMERCIAL

## 2023-05-03 PROCEDURE — 97110 THERAPEUTIC EXERCISES: CPT | Performed by: PHYSICAL THERAPY ASSISTANT

## 2023-05-03 PROCEDURE — 97112 NEUROMUSCULAR REEDUCATION: CPT | Performed by: PHYSICAL THERAPY ASSISTANT

## 2023-05-03 PROCEDURE — 97140 MANUAL THERAPY 1/> REGIONS: CPT | Performed by: PHYSICAL THERAPY ASSISTANT

## 2023-05-03 RX ORDER — METHOCARBAMOL 500 MG/1
TABLET, FILM COATED ORAL
Qty: 40 TABLET | Refills: 0 | Status: SHIPPED | OUTPATIENT
Start: 2023-05-03

## 2023-05-03 NOTE — PROGRESS NOTES
[facility], [address] [place] [address] Eleuterio Bal, [address]  Phone: [phone], [phone]          Physical Therapy Treatment Note/ Progress Report:       Date: 5/3/2023          Patient Name; :  Desi Dasilva; 1980   Dx/ICD Code:  Diagnosis: Left Knee OA (M17.12) s/p TKA 3/15/2023   Treatment Diagnosis: Muscle Weakness (M62.81); Left Knee (R67.185)                       Physician:  Referring Provider: Dr. Lesly Kaplan        Total PT Visits: 8     Measures Previous Current   Pain (0-10) 5/10 210   Disability % LEFS:  85% =  LEFS:  63%  =         ROM  Lacking 7 degrees to 115 degrees of flexion         Strength                 Specific Functional Improvements & Impressions:  Reggie Francis is making slow, steady progress in therapy. She notes she is getting some spasms in the knee cap but they do not last long. She reports that other than the spasms she is feeling good. Still unable to sleep in bed secondary to unable to find a comfortable position. She still feels unsteady in her ambulation and continues to present with a shuffle pattern. We have reviewed importance a longer stride and improved heel strike. The pain she was having prior to surgery is definitely improved. Her primary complaint at this point is the continued shuffle gait pattern and feeling unsteady on her feet. We have discussed the importance of strengthening and intentional gait work for this to improve.       Plan & Recommendations:  [x] Continue rehabilitation due to objective improvement and continued functional deficits with frequency and duration: 2 times a week for 6-8 weeks  [] Progress toward  []GAP, []Work Conditioning, []Independent HEP   [] Discharge due to   [] All goals achieved, [] Maximized \"medical necessity\" [] No subjective or objective improvements      Electronically signed by:  Sara Rojas, ANSHU, Trinity Dutta PT,MPT,ATC, cert DN     Therapy Plan of Care Re-Certification  This
Plan just implemented, too soon to assess goals progression <30days   [] Goals require adjustment due to lack of progress  [] Patient is not progressing as expected and requires additional follow up with physician  [] Other    Prognosis for POC: [x] Good [] Fair  [] Poor    Patient requires continued skilled intervention: [x] Yes  [] No    Treatment/Activity Tolerance:  [x] Patient able to complete treatment  [] Patient limited by fatigue  [] Patient limited by pain    [] Patient limited by other medical complications  [] Other:     Return to Play: (if applicable)   []  Stage 1: Intro to Strength   []  Stage 2: Return to Run and Strength   []  Stage 3: Return to Jump and Strength   []  Stage 4: Dynamic Strength and Agility   []  Stage 5: Sport Specific Training     []  Ready to Return to Play, Meets All Above Stages   []  Not Ready for Return to Sports   Comments:                         PLAN: See eval  [x] Continue per plan of care [] Alter current plan (see comments above)  [] Plan of care initiated [] Hold pending MD visit [] Discharge    Electronically signed by:  Maria G Genao PTA      Note: If patient does not return for scheduled/ recommended follow up visits, this note will serve as a discharge from care along with most recent update on progress.

## 2023-05-04 ENCOUNTER — HOSPITAL ENCOUNTER (OUTPATIENT)
Dept: PHYSICAL THERAPY | Age: 43
Setting detail: THERAPIES SERIES
Discharge: HOME | End: 2023-05-04
Payer: COMMERCIAL

## 2023-05-04 PROCEDURE — 97112 NEUROMUSCULAR REEDUCATION: CPT | Performed by: PHYSICAL THERAPY ASSISTANT

## 2023-05-04 PROCEDURE — 97110 THERAPEUTIC EXERCISES: CPT | Performed by: PHYSICAL THERAPY ASSISTANT

## 2023-05-04 NOTE — PROGRESS NOTES
[facility],  [place] [address] Eleuterio Gallagher, [address]  Phone: [phone], [phone]          Physical Therapy Treatment Note/ Progress Report:         Date:  2023    Patient Name:  Tiffany Keita    :  1980  MRN: [id number]  Restrictions/Precautions:    Medical/Treatment Diagnosis Information:  Diagnosis: Left Knee OA (M17.12) s/p TKA 3/15/2023   Treatment Diagnosis: Muscle Weakness (M62.81); Left Knee (M25.562)               Insurance/Certification information:  PT Insurance Information: [facility]  Physician Information:  Referring Provider: Dr. Teresita Dias  Has the plan of care been signed (Y/N):        []  Yes  [x]  No     Date of Patient follow up with Physician: 2023    Is this a Progress Report:     []  Yes  [x]  No      If Yes:  Date Range for reporting period:  Initial Eval: 2023  Beginnin2023 --- Endin2023  Beginnin/3/2023 --- Endin/3/2023    Progress report will be due (10 Rx or 30 days whichever is less):      Recertification will be due (POC Duration  / 90 days whichever is less): 2023      Visit # Insurance Allowable Auth Required   In Person Eval +8 24 visits 23-23 [x]  Yes     []  No    Tele Health -  []  Yes     []  No    Total Eval+8       Functional Test Score: LEFS :85 % (Total: )  Date assessed: 2023  LEFS:  63% (Total:  )     Date:  5/3/2023      Latex Allergy:  [x]NO      []YES    Preferred Language for Healthcare:   [x]English       []other:    Pain level:  2/10     SUBJECTIVE: No spasms today and presents with improved gait pattern.       OBJECTIVE: See eval  Observation: Lacking 7 to 115 2023  Test measurements:      RESTRICTIONS/PRECAUTIONS: CP, Anxiety,Depression, muscle spasms     Exercises/Interventions:   Therapeutic Ex (33291)  Therapeutic Activity (56483)  NMR re-education (20910) Sets/Reps Notes/CUES   Bike 5' L5         Supine on wedge  Quad Sets 20 x 5\"    Supine

## 2023-05-05 ENCOUNTER — CARE COORDINATION (OUTPATIENT)
Dept: CASE MANAGEMENT | Age: 43
End: 2023-05-05

## 2023-05-05 NOTE — CARE COORDINATION
[facility] Care Transitions Follow Up Call    Patient: Rohan Ingram  Patient : 1980   MRN: [id number]  Reason for Admission: s/p left knee replacement   Discharge Date: 3/18/23 RARS: No data recorded    Follow Up  Future Appointments   Date Time Provider Cornel Gleason   5/10/2023  1:00 PM Hudson Huddleston, PTA [facility] PT [facility]   2023  3:15 PM Emerson Apley, PT [facility]   2023 44:61 AM Irena Conti MD [facility]   Second and final attempt made to reach patient for transition call. VM left stating purpose of call along with my contact information requesting a return call.         Care Transitions Subsequent and Final Call    Subsequent and Final Calls  Care Transitions Interventions  Other Interventions:           Patricia Morris RN

## 2023-05-09 ENCOUNTER — TELEPHONE (OUTPATIENT)
Dept: ORTHOPEDIC SURGERY | Age: 43
End: 2023-05-09

## 2023-05-10 ENCOUNTER — HOSPITAL ENCOUNTER (OUTPATIENT)
Dept: PHYSICAL THERAPY | Age: 43
Setting detail: THERAPIES SERIES
Discharge: HOME | End: 2023-05-10
Payer: COMMERCIAL

## 2023-05-10 PROCEDURE — 97110 THERAPEUTIC EXERCISES: CPT | Performed by: PHYSICAL THERAPY ASSISTANT

## 2023-05-10 PROCEDURE — 97140 MANUAL THERAPY 1/> REGIONS: CPT | Performed by: PHYSICAL THERAPY ASSISTANT

## 2023-05-10 NOTE — PROGRESS NOTES
[facility], [address] [place] [address], [address]  Phone: [phone], [phone]          Physical Therapy Treatment Note/ Progress Report:         Date:  5/10/2023    Patient Name:  Merry Montes    :  1980  MRN: [id number]  Restrictions/Precautions:    Medical/Treatment Diagnosis Information:  Diagnosis: Left Knee OA (M17.12) s/p TKA 3/15/2023   Treatment Diagnosis: Muscle Weakness (M62.81); Left Knee (M25.562)               Insurance/Certification information:  PT Insurance Information: [facility]  Physician Information:  Referring Provider: Dr. Jenniffer Barrett  Has the plan of care been signed (Y/N):        []  Yes  [x]  No     Date of Patient follow up with Physician: 2023    Is this a Progress Report:     []  Yes  [x]  No      If Yes:  Date Range for reporting period:  Initial Eval: 2023  Beginnin2023 --- Endin2023  Beginnin/3/2023 --- Endin/3/2023    Progress report will be due (10 Rx or 30 days whichever is less): 38     Recertification will be due (POC Duration  / 90 days whichever is less): 2023      Visit # Insurance Allowable Auth Required   In Person Eval +9 24 visits 23-23 [x]  Yes     []  No    Tele Health -  []  Yes     []  No    Total Eval+9       Functional Test Score: LEFS :85 % (Total: )  Date assessed: 2023  LEFS:  63% (Total:  )     Date:  5/3/2023      Latex Allergy:  [x]NO      []YES    Preferred Language for Healthcare:   [x]English       []other:    Pain level:  2/10     SUBJECTIVE: No spasms today and presents with improved gait pattern.       OBJECTIVE: See eval  Observation: Lacking 7 to 115 2023  Test measurements:      RESTRICTIONS/PRECAUTIONS: CP, Anxiety,Depression, muscle spasms     Exercises/Interventions:   Therapeutic Ex (68139)  Therapeutic Activity (14333)  NMR re-education (94375) Sets/Reps Notes/CUES   Bike 5' L5         Supine on wedge  Quad Sets 20 x 5\"    Supine

## 2023-05-11 ENCOUNTER — HOSPITAL ENCOUNTER (OUTPATIENT)
Dept: PHYSICAL THERAPY | Age: 43
Setting detail: THERAPIES SERIES
Discharge: HOME | End: 2023-05-11
Payer: COMMERCIAL

## 2023-05-11 PROCEDURE — 97110 THERAPEUTIC EXERCISES: CPT

## 2023-05-11 PROCEDURE — 97140 MANUAL THERAPY 1/> REGIONS: CPT

## 2023-05-11 NOTE — FLOWSHEET NOTE
strengthening, flexibility, endurance, ROM for improvements in LE, proximal hip, and core control with self care, mobility, lifting, ambulation. [x] (61035) Provided verbal/tactile cueing for activities related to improving balance, coordination, kinesthetic sense, posture, motor skill, proprioception to assist with LE, proximal hip, and core control in self-care, mobility, lifting, ambulation and eccentric single leg control. NMR and Therapeutic Activities:    [x] (96129 or 84406) Provided verbal/tactile cueing for activities related to improving balance, coordination, kinesthetic sense, posture, motor skill, proprioception and motor activation to allow for proper function of core, proximal hip and LE with self-care and ADLs and functional mobility.    [x] (11152) Gait Re-education- Provided training and instruction to the patient for proper LE, core and proximal hip recruitment and positioning and eccentric body weight control with ambulation re-education including up and down stairs     Home Exercise Program:    [x] (19127) Reviewed/Progressed HEP activities related to strengthening, flexibility, endurance, ROM of core, proximal hip and LE for functional self-care, mobility, lifting and ambulation/stair navigation   [x] (01488) Reviewed/Progressed HEP activities related to improving balance, coordination, kinesthetic sense, posture, motor skill, proprioception of core, proximal hip and LE for self-care, mobility, lifting, and ambulation/stair navigation      Manual Treatments:  PROM / STM / Oscillations-Mobs:  G-I, II, III, IV (PA's, Inf., Post.)  [x] (87998) Provided manual therapy to mobilize LE, proximal hip and/or LS spine soft tissue/joints for the purpose of modulating pain, promoting relaxation, increasing ROM, reducing/eliminating soft tissue swelling/inflammation/restriction, improving soft tissue extensibility and allowing for proper ROM for normal function with self-care, mobility, lifting and

## 2023-05-16 ENCOUNTER — OFFICE VISIT (OUTPATIENT)
Dept: ORTHOPEDIC SURGERY | Age: 43
End: 2023-05-16

## 2023-05-16 ENCOUNTER — HOSPITAL ENCOUNTER (OUTPATIENT)
Dept: PHYSICAL THERAPY | Age: 43
Setting detail: THERAPIES SERIES
Discharge: HOME | End: 2023-05-16
Payer: COMMERCIAL

## 2023-05-16 VITALS — HEIGHT: 63 IN | WEIGHT: 273 LBS | BODY MASS INDEX: 48.37 KG/M2

## 2023-05-16 DIAGNOSIS — M25.561 CHRONIC PAIN OF RIGHT KNEE: ICD-10-CM

## 2023-05-16 DIAGNOSIS — G89.29 CHRONIC PAIN OF RIGHT KNEE: ICD-10-CM

## 2023-05-16 DIAGNOSIS — M17.11 PRIMARY LOCALIZED OSTEOARTHRITIS OF RIGHT KNEE: ICD-10-CM

## 2023-05-16 DIAGNOSIS — Z96.652 S/P TOTAL KNEE REPLACEMENT, LEFT: Primary | ICD-10-CM

## 2023-05-16 PROCEDURE — 97140 MANUAL THERAPY 1/> REGIONS: CPT | Performed by: PHYSICAL THERAPY ASSISTANT

## 2023-05-16 PROCEDURE — 97110 THERAPEUTIC EXERCISES: CPT | Performed by: PHYSICAL THERAPY ASSISTANT

## 2023-05-16 RX ORDER — LIDOCAINE HYDROCHLORIDE 10 MG/ML
4 INJECTION, SOLUTION INFILTRATION; PERINEURAL ONCE
Status: COMPLETED | OUTPATIENT
Start: 2023-05-16 | End: 2023-05-16

## 2023-05-16 RX ORDER — TRIAMCINOLONE ACETONIDE 40 MG/ML
40 INJECTION, SUSPENSION INTRA-ARTICULAR; INTRAMUSCULAR ONCE
Status: COMPLETED | OUTPATIENT
Start: 2023-05-16 | End: 2023-05-16

## 2023-05-16 RX ADMIN — LIDOCAINE HYDROCHLORIDE 4 ML: 10 INJECTION, SOLUTION INFILTRATION; PERINEURAL at 11:39

## 2023-05-16 RX ADMIN — TRIAMCINOLONE ACETONIDE 40 MG: 40 INJECTION, SUSPENSION INTRA-ARTICULAR; INTRAMUSCULAR at 11:40

## 2023-05-16 NOTE — FLOWSHEET NOTE
[facility], [address] [place], [place] [address]Brenda richardus, [address]  Phone: [phone], [phone]          Physical Therapy Treatment Note/ Progress Report:         Date:  2023    Patient Name:  Roxanna Smart    :  1980  MRN: [id number]  Restrictions/Precautions:    Medical/Treatment Diagnosis Information:  Diagnosis: Left Knee OA (M17.12) s/p TKA 3/15/2023   Treatment Diagnosis: Muscle Weakness (M62.81); Left Knee (M25.562)               Insurance/Certification information:  PT Insurance Information: [facility]  Physician Information:  Referring Provider: Dr. Cuba Higgins  Has the plan of care been signed (Y/N):        []  Yes  [x]  No     Date of Patient follow up with Physician: 2023    Is this a Progress Report:     []  Yes  [x]  No      If Yes:  Date Range for reporting period:  Initial Eval: 2023  Beginnin2023 --- Endin2023  Beginnin/3/2023 --- Endin/3/2023    Progress report will be due (10 Rx or 30 days whichever is less): 5003     Recertification will be due (POC Duration  / 90 days whichever is less): 2023      Visit # Insurance Allowable Auth Required   In Person Eval +11 24 visits 23-23 [x]  Yes     []  No    Tele Health -  []  Yes     []  No    Total Eval+ 11       Functional Test Score: LEFS :85 % (Total: )  Date assessed: 2023  LEFS:  63% (Total:  )     Date:  5/3/2023      Latex Allergy:  [x]NO      []YES    Preferred Language for Healthcare:   [x]English       []other:    Pain level:  2/10     SUBJECTIVE: Doing OK - MD pleased with progress. Does not have to return for the left knee. He did x-ray the right hip and knee and gave her an injection in the right knee. To continue LE strengthening.      OBJECTIVE: See eval  Observation: Lacking 7 to 115 2023; Lacking 5 to 117 °   Test measurements:      RESTRICTIONS/PRECAUTIONS: CP, Anxiety,Depression, muscle spasms     Exercises/Interventions:

## 2023-05-17 NOTE — PROGRESS NOTES
History: 36 y/o F with hx of CP s/p L TKA 3/15. Continues with PT and making progress with her L knee, but says they have noted her dragging her R leg during gait cycle. Unclear if hip or knee, she has some discomfort in both areas. Continues on robaxin and celebrex, no new issues. Exam: L knee with well healed incision. ROM 0-125. No ligamentous instability, NVID. R knee  Skin in tact without ulcerations or previous scars  Minimal Effusion  No focal ttp  ROM: 0 - 110 with PF crepitus  Grossly stable to varus / valgus stress and posterior drawer   No evidence of neurovascular compromise distally  Special tests: None    R hip: Restricted ER and IR to about 20 deg total which doesn't cause pain, but more so muscle tightness. No TTP troch bursa, no pain with SLR. Good abductor strength NVID. Imaging: 3 views of the right knee and 1 view of the right hip ordered obtained interpreted and reviewed    Hip x-rays show no degenerative changes or other bony abnormalities    Knee x-rays show patellofemoral arthritis KL grade 3 otherwise minimal degenerative changes    Assessment: 80-year-old female with CP recovered well 6 weeks status post left TKA. Also with right knee patellofemoral arthritis. Muscle stiffness in the hips. Plan: Advised her to continue her Robaxin for the hips and work on some stretching modalities with PT. Did a steroid injection for her right knee patellofemoral arthritis today. Stretching for the right knee extensor mechanism will be beneficial also. She is recovered well from the left TKA. She can follow-up with me as needed at this point. Advised we should continue to injections as long as possible before considering knee replacement on the right.       Won Izquierdo MD

## 2023-05-18 ENCOUNTER — HOSPITAL ENCOUNTER (OUTPATIENT)
Dept: PHYSICAL THERAPY | Age: 43
Setting detail: THERAPIES SERIES
Discharge: HOME | End: 2023-05-18
Payer: COMMERCIAL

## 2023-05-18 PROCEDURE — 97112 NEUROMUSCULAR REEDUCATION: CPT | Performed by: PHYSICAL THERAPY ASSISTANT

## 2023-05-18 PROCEDURE — 97110 THERAPEUTIC EXERCISES: CPT | Performed by: PHYSICAL THERAPY ASSISTANT

## 2023-05-18 PROCEDURE — 97140 MANUAL THERAPY 1/> REGIONS: CPT | Performed by: PHYSICAL THERAPY ASSISTANT

## 2023-05-18 NOTE — FLOWSHEET NOTE
[facility], [address] [place] [address], [address]  Phone: [phone], [phone]          Physical Therapy Treatment Note/ Progress Report:         Date:  2023    Patient Name:  Sherita Coronado    :  1980  MRN: [id number]  Restrictions/Precautions:    Medical/Treatment Diagnosis Information:  Diagnosis: Left Knee OA (M17.12) s/p TKA 3/15/2023   Treatment Diagnosis: Muscle Weakness (M62.81); Left Knee (M25.562)               Insurance/Certification information:  PT Insurance Information: [facility]  Physician Information:  Referring Provider: Dr. Marilia Murillo  Has the plan of care been signed (Y/N):        []  Yes  [x]  No     Date of Patient follow up with Physician: 2023    Is this a Progress Report:     []  Yes  [x]  No      If Yes:  Date Range for reporting period:  Initial Eval: 2023  Beginnin2023 --- Endin2023  Beginnin/3/2023 --- Endin/3/2023    Progress report will be due (10 Rx or 30 days whichever is less): 1386     Recertification will be due (POC Duration  / 90 days whichever is less): 2023      Visit # Insurance Allowable Auth Required   In Person Eval +12 24 visits 23-23 [x]  Yes     []  No    Tele Health -  []  Yes     []  No    Total Eval+ 12       Functional Test Score: LEFS :85 % (Total: )  Date assessed: 2023  LEFS:  63% (Total:  )     Date:  5/3/2023      Latex Allergy:  [x]NO      []YES    Preferred Language for Healthcare:   [x]English       []other:    Pain level:  210     SUBJECTIVE: Doing well.     OBJECTIVE: See eval  Observation: Lacking 7 to 115 2023; Lacking 5 to 117 °   Test measurements:      RESTRICTIONS/PRECAUTIONS: CP, Anxiety,Depression, muscle spasms     Exercises/Interventions:   Therapeutic Ex (36803)  Therapeutic Activity (73255)  NMR re-education (06235) Sets/Reps Notes/CUES   Bike 5' L5         Supine on wedge  Quad Sets 20 x 5\"    Supine on wedge SLR  3x10

## 2023-05-22 DIAGNOSIS — I10 ESSENTIAL HYPERTENSION: ICD-10-CM

## 2023-05-22 RX ORDER — METOPROLOL SUCCINATE 100 MG/1
TABLET, EXTENDED RELEASE ORAL
Qty: 28 TABLET | Refills: 2 | Status: SHIPPED | OUTPATIENT
Start: 2023-05-22

## 2023-05-24 ENCOUNTER — HOSPITAL ENCOUNTER (OUTPATIENT)
Dept: PHYSICAL THERAPY | Age: 43
Setting detail: THERAPIES SERIES
Discharge: HOME | End: 2023-05-24
Payer: COMMERCIAL

## 2023-05-24 PROCEDURE — 97112 NEUROMUSCULAR REEDUCATION: CPT | Performed by: PHYSICAL THERAPY ASSISTANT

## 2023-05-24 PROCEDURE — 97110 THERAPEUTIC EXERCISES: CPT | Performed by: PHYSICAL THERAPY ASSISTANT

## 2023-05-24 PROCEDURE — 97140 MANUAL THERAPY 1/> REGIONS: CPT | Performed by: PHYSICAL THERAPY ASSISTANT

## 2023-05-24 NOTE — FLOWSHEET NOTE
[facility], [address] [place] [address]  Phone: [phone], [phone]          Physical Therapy Treatment Note/ Progress Report:         Date:  2023    Patient Name:  Dion Rivera    :  1980  MRN: [id number]  Restrictions/Precautions:    Medical/Treatment Diagnosis Information:  Diagnosis: Left Knee OA (M17.12) s/p TKA 3/15/2023   Treatment Diagnosis: Muscle Weakness (M62.81); Left Knee (M25.562)               Insurance/Certification information:  PT Insurance Information: [facility]  Physician Information:  Referring Provider: Dr. Genie Felty  Has the plan of care been signed (Y/N):        []  Yes  [x]  No     Date of Patient follow up with Physician: 2023    Is this a Progress Report:     []  Yes  [x]  No      If Yes:  Date Range for reporting period:  Initial Eval: 2023  Beginnin2023 --- Endin2023  Beginnin/3/2023 --- Endin/3/2023    Progress report will be due (10 Rx or 30 days whichever is less): 8714     Recertification will be due (POC Duration  / 90 days whichever is less): 2023      Visit # Insurance Allowable Auth Required   In Person Eval +13 24 visits 23-23 [x]  Yes     []  No    Tele Health -  []  Yes     []  No    Total Eval+ 13       Functional Test Score: LEFS :85 % (Total: )  Date assessed: 2023  LEFS:  63% (Total:  )     Date:  5/3/2023      Latex Allergy:  [x]NO      []YES    Preferred Language for Healthcare:   [x]English       []other:    Pain level:  2/10     SUBJECTIVE: Doing well. Was able to sleep in her bed last night and did well. Tends to stand on bent knee and we had discussion today about importance of breaking this habit in order to help get her end range full extension.       OBJECTIVE: See eval  Observation: Lacking 7 to 115 2023; Lacking 5 to 117 °   Test measurements:      RESTRICTIONS/PRECAUTIONS: CP, Anxiety,Depression, muscle spasms

## 2023-05-25 ENCOUNTER — HOSPITAL ENCOUNTER (OUTPATIENT)
Dept: PHYSICAL THERAPY | Age: 43
Setting detail: THERAPIES SERIES
Discharge: HOME | End: 2023-05-25
Payer: COMMERCIAL

## 2023-05-25 RX ORDER — ERGOCALCIFEROL 1.25 MG/1
50000 CAPSULE ORAL WEEKLY
Qty: 4 CAPSULE | Refills: 0 | Status: SHIPPED | OUTPATIENT
Start: 2023-05-25

## 2023-05-25 NOTE — TELEPHONE ENCOUNTER
Future appt scheduled 0 appt scheduled                Last appt 02/28/2023      Last Written 05/01/2023    vitamin D (ERGOCALCIFEROL) 1.25 MG (10896 UT) CAPS capsule  #4 caps  0 RF

## 2023-05-25 NOTE — FLOWSHEET NOTE
[address], [facility] ([facility])    Physical Therapy  Cancellation/No-show Note  Patient Name:  Mary Girard  :  1980   Date:  2023    Cancelled visits to date: 1  No-shows to date: 0    For today's appointment patient:  [x]  Cancelled  []  Rescheduled appointment  []  No-show     Reason given by patient:  []  Patient ill  []  Conflicting appointment  []  No transportation    []  Conflict with work  [x]  No reason given  []  Other:     Comments:      Phone call information:   []  Phone call made today to patient. []  Patient answered, conversation as follows:    []  Patient did not answer. [x]  Phone call not needed - pt contacted us to cancel and provided reason for cancellation.      Electronically signed by:  Dee Dee Shields PTA

## 2023-05-27 DIAGNOSIS — Z96.652 S/P TOTAL KNEE REPLACEMENT, LEFT: Primary | ICD-10-CM

## 2023-05-30 ENCOUNTER — HOSPITAL ENCOUNTER (OUTPATIENT)
Dept: PHYSICAL THERAPY | Age: 43
Setting detail: THERAPIES SERIES
Discharge: HOME | End: 2023-05-30
Payer: COMMERCIAL

## 2023-05-30 PROCEDURE — 97140 MANUAL THERAPY 1/> REGIONS: CPT

## 2023-05-30 PROCEDURE — 97112 NEUROMUSCULAR REEDUCATION: CPT

## 2023-05-30 PROCEDURE — 97110 THERAPEUTIC EXERCISES: CPT

## 2023-05-30 NOTE — FLOWSHEET NOTE
[facility], [address] [place], [place] [address]  Phone: [phone], [phone]          Physical Therapy Treatment Note/ Progress Report:         Date:  2023    Patient Name:  Antoine Sharma    :  1980  MRN: [id number]  Restrictions/Precautions:    Medical/Treatment Diagnosis Information:  Diagnosis: Left Knee OA (M17.12) s/p TKA 3/15/2023   Treatment Diagnosis: Muscle Weakness (M62.81);  Left Knee (M25.562)               Insurance/Certification information:  PT Insurance Information: [facility]  Physician Information:  Referring Provider: Dr. Esa Rodriguez  Has the plan of care been signed (Y/N):        []  Yes  [x]  No     Date of Patient follow up with Physician: 2023    Is this a Progress Report:     []  Yes  [x]  No      If Yes:  Date Range for reporting period:  Initial Eval: 2023  Beginnin2023 --- Endin2023  Beginnin/3/2023 --- Endin/3/2023    Progress report will be due (10 Rx or 30 days whichever is less): 7120     Recertification will be due (POC Duration  / 90 days whichever is less): 2023      Visit # Insurance Allowable Auth Required   In Person Eval +14 24 visits 23-23 [x]  Yes     []  No    Tele Health -  []  Yes     []  No    Total Eval+ 14       Functional Test Score: LEFS :85 % (Total: )  Date assessed: 2023  LEFS:  63% (Total:  )     Date:  5/3/2023      Latex Allergy:  [x]NO      []YES    Preferred Language for Healthcare:   [x]English       []other:    Pain level:  2/10     SUBJECTIVE: Pt reports having     OBJECTIVE: See eval  Observation: Lacking 7 to 115 2023; Lacking 5 to 117 °   2023: PROM Lacking 5 ° to 124 °   Test measurements:      RESTRICTIONS/PRECAUTIONS: CP, Anxiety,Depression, muscle spasms     Exercises/Interventions:   Therapeutic Ex (98447)  Therapeutic Activity (44386)  NMR re-education (47364) Sets/Reps Notes/CUES   Bike 5' L5         Supine on wedge  Quad

## 2023-05-31 ENCOUNTER — HOSPITAL ENCOUNTER (OUTPATIENT)
Dept: PHYSICAL THERAPY | Age: 43
Setting detail: THERAPIES SERIES
Discharge: HOME | End: 2023-05-31
Payer: COMMERCIAL

## 2023-05-31 PROCEDURE — 97112 NEUROMUSCULAR REEDUCATION: CPT | Performed by: PHYSICAL THERAPY ASSISTANT

## 2023-05-31 PROCEDURE — 97110 THERAPEUTIC EXERCISES: CPT | Performed by: PHYSICAL THERAPY ASSISTANT

## 2023-05-31 PROCEDURE — 97140 MANUAL THERAPY 1/> REGIONS: CPT | Performed by: PHYSICAL THERAPY ASSISTANT

## 2023-05-31 RX ORDER — METHOCARBAMOL 500 MG/1
TABLET, FILM COATED ORAL
Qty: 40 TABLET | Refills: 0 | Status: SHIPPED | OUTPATIENT
Start: 2023-05-31

## 2023-05-31 NOTE — FLOWSHEET NOTE
[facility], [address] [place] [address], [facility]  Phone: [phone], [phone]          Physical Therapy Treatment Note/ Progress Report:         Date:  2023    Patient Name:  Skinny Spears    :  1980  MRN: [id number]  Restrictions/Precautions:    Medical/Treatment Diagnosis Information:  Diagnosis: Left Knee OA (M17.12) s/p TKA 3/15/2023   Treatment Diagnosis: Muscle Weakness (M62.81); Left Knee (M25.562)               Insurance/Certification information:  PT Insurance Information: [facility]  Physician Information:  Referring Provider: Dr. Santiago Green  Has the plan of care been signed (Y/N):        []  Yes  [x]  No     Date of Patient follow up with Physician: 2023    Is this a Progress Report:     []  Yes  [x]  No      If Yes:  Date Range for reporting period:  Initial Eval: 2023  Beginnin2023 --- Endin2023  Beginnin/3/2023 --- Endin/3/2023    Progress report will be due (10 Rx or 30 days whichever is less):      Recertification will be due (POC Duration  / 90 days whichever is less): 2023      Visit # Insurance Allowable Auth Required   In Person Eval +15 24 visits 23-23 [x]  Yes     []  No    Tele Health -  []  Yes     []  No    Total Eval+ 15       Functional Test Score: LEFS :85 % (Total: )  Date assessed: 2023  LEFS:  63% (Total:  )     Date:  5/3/2023      Latex Allergy:  [x]NO      []YES    Preferred Language for Healthcare:   [x]English       []other:    Pain level:  2/10     SUBJECTIVE: States she is continuing to have spasms in her patellar area.       OBJECTIVE: See eval  Observation: Lacking 7 to 115 2023; Lacking 5 to 117 °   2023: PROM Lacking 5 ° to 124 °   Test measurements:      RESTRICTIONS/PRECAUTIONS: CP, Anxiety,Depression, muscle spasms     Exercises/Interventions:   Therapeutic Ex (06560)  Therapeutic Activity (43304)  NMR re-education (44074) Sets/Reps

## 2023-06-05 ENCOUNTER — HOSPITAL ENCOUNTER (OUTPATIENT)
Dept: PHYSICAL THERAPY | Age: 43
Setting detail: THERAPIES SERIES
Discharge: HOME | End: 2023-06-05
Payer: COMMERCIAL

## 2023-06-05 PROCEDURE — 97140 MANUAL THERAPY 1/> REGIONS: CPT | Performed by: PHYSICAL THERAPY ASSISTANT

## 2023-06-05 PROCEDURE — 97110 THERAPEUTIC EXERCISES: CPT | Performed by: PHYSICAL THERAPY ASSISTANT

## 2023-06-05 PROCEDURE — 97112 NEUROMUSCULAR REEDUCATION: CPT | Performed by: PHYSICAL THERAPY ASSISTANT

## 2023-06-05 NOTE — FLOWSHEET NOTE
Sets/Reps Notes/CUES   Bike 5' L5         Supine on wedge  Quad Sets 20 x 5\"    Supine on wedge SLR  3x10    Supine Marches 20 x     SAQ  / LAQ  X20 5\" hold ea     LS HS Stretch 3 x 30\"    LS Calf Stretch 3 x 30\"    Supine on Wedge Hip Add Squeeze    Supine on wedge ABD  Orange x20 5\" hold    Seated Hip Abd with LAQ 2 x 10    Seated EOB flexion stretch  20 x 5\"                   Knee extension isos     Knee extension  10# x20       Standing [address] 2# 2 x 10     Standing Marches 2# 20 x with tactile cues to keep knee straight when [address] through leg and stay upright at waist    Standing HIp ABD  2# 2 x 10     Standing Mini Squats  20 x with gait belt to 2\" box in chair  Focusing on knee extension   FSU  6\" 2x10 ea R, L  Focusing on knee extension      \"   Forward step lunge  BOSU  x10 R, L     Leg Press  80# x30 / 50# x20 Left only  11 reps at 60#   Sit to stand from 2\" box  X20 with gait belt     A Frames  X10 leading with right, x10 leading with left   Orange PB     Gait training  10' - focus on increased stride length and heel strike                    Manual Intervention (27082)     Patella Mobs 5'    DTM to hamstrings and ITBand 10'    PROM 10'                        [address] access code:   Access Code: [email or web address]  URL: [email or web address]  Date: 04/06/2023  Prepared by: Mare [place]    Exercises  - Seated Long Arc Quad  - 2-3 x daily - 7 x weekly - 2-3 sets - 10 reps  - Seated Quad Set  - 2-3 x daily - 7 x weekly - 1 sets - 10 reps - 5\" hold  - Seated Passive Knee Extension  - 1 x daily - 7 x weekly - 3 sets - 1 reps - 3' hold  - Seated Knee Flexion AAROM  - 2-3 x daily - 7 x weekly - 1 sets - 10 reps - 10\" hold  - Standing Weight Shift  - 1 x daily - 7 x weekly - 1 sets - 20 reps  - Standing March with Counter Support  - 1-2 x daily - 7 x weekly - 2 sets - 10 reps  - Standing Alternating Knee Flexion  - 1-2 x daily - 7 x weekly - 2 sets - 10 reps             Patient Education 10' Pt education with HEP

## 2023-06-07 ENCOUNTER — HOSPITAL ENCOUNTER (OUTPATIENT)
Dept: PHYSICAL THERAPY | Age: 43
Setting detail: THERAPIES SERIES
Discharge: HOME | End: 2023-06-07
Payer: COMMERCIAL

## 2023-06-07 PROCEDURE — 97140 MANUAL THERAPY 1/> REGIONS: CPT | Performed by: PHYSICAL THERAPY ASSISTANT

## 2023-06-07 PROCEDURE — 97110 THERAPEUTIC EXERCISES: CPT | Performed by: PHYSICAL THERAPY ASSISTANT

## 2023-06-07 PROCEDURE — 97112 NEUROMUSCULAR REEDUCATION: CPT | Performed by: PHYSICAL THERAPY ASSISTANT

## 2023-06-07 NOTE — FLOWSHEET NOTE
CP, Anxiety,Depression, muscle spasms     Exercises/Interventions:   Therapeutic Ex (20684)  Therapeutic Activity (78161)  NMR re-education (09107) Sets/Reps Notes/CUES   Bike 5' L5         Supine on wedge  Quad Sets 20 x 5\"    Supine on wedge SLR  3x10    Supine Marches 20 x     SAQ  / LAQ  X20 5\" hold ea     LS HS Stretch 3 x 30\"    LS Calf Stretch 3 x 30\"    Supine on Wedge Hip Add Squeeze    Supine on wedge ABD  Orange x20 5\" hold    Seated Hip Abd with LAQ 2 x 10    Seated EOB flexion stretch  20 x 5\"                   Knee extension isos     Knee extension  10# x20       Standing [address] 2# 2 x 10     Standing Marches 2# 20 x with tactile cues to keep knee straight when WB'ing through leg and stay upright at waist    Standing HIp ABD  2# 2 x 10     Standing Mini Squats  20 x with gait belt to 2\" box in chair  Focusing on knee extension   FSU  6\" 2x10 ea R, L  Focusing on knee extension      \"   Forward step lunge  BOSU  x10 R, L     Leg Press  80# x30 / 50# x20 Left only  11 reps at 60#   Sit to stand from 2\" box  X20 with gait belt     A Frames  X10 leading with right, x10 leading with left   Orange PB     Gait training  10' - focus on increased stride length and heel strike                    Manual Intervention (55971)     Patella Mobs 5'    DTM to hamstrings and ITBand 10'    PROM 10'                        [address] access code:   Access Code: [email or web address]  URL: [email or web address]  Date: 04/06/2023  Prepared by: Concepcion Paris    Exercises  - Seated Long Arc Quad  - 2-3 x daily - 7 x weekly - 2-3 sets - 10 reps  - Seated Quad Set  - 2-3 x daily - 7 x weekly - 1 sets - 10 reps - 5\" hold  - Seated Passive Knee Extension  - 1 x daily - 7 x weekly - 3 sets - 1 reps - 3' hold  - Seated Knee Flexion AAROM  - 2-3 x daily - 7 x weekly - 1 sets - 10 reps - 10\" hold  - Standing Weight Shift  - 1 x daily - 7 x weekly - 1 sets - 20 reps  - Standing March with Counter Support  - 1-2 x daily - 7 x weekly - 2 sets -

## 2023-06-20 ENCOUNTER — HOSPITAL ENCOUNTER (OUTPATIENT)
Dept: PHYSICAL THERAPY | Age: 43
Setting detail: THERAPIES SERIES
Discharge: HOME | End: 2023-06-20
Payer: COMMERCIAL

## 2023-06-20 PROCEDURE — 97110 THERAPEUTIC EXERCISES: CPT | Performed by: PHYSICAL THERAPY ASSISTANT

## 2023-06-20 PROCEDURE — 97112 NEUROMUSCULAR REEDUCATION: CPT | Performed by: PHYSICAL THERAPY ASSISTANT

## 2023-06-20 NOTE — FLOWSHEET NOTE
[facility], [address] [place], [place] [address]  Phone: [phone], [phone]          Physical Therapy Treatment Note/ Progress Report:         Date:  2023    Patient Name:  Lowell Pascual    :  1980  MRN: [id number]  Restrictions/Precautions:    Medical/Treatment Diagnosis Information:  Diagnosis: Left Knee OA (M17.12) s/p TKA 3/15/2023   Treatment Diagnosis: Muscle Weakness (M62.81); Left Knee (M25.562)               Insurance/Certification information:  PT Insurance Information: [facility]  Physician Information:  Referring Provider: Dr. Yulia Begum  Has the plan of care been signed (Y/N):        []  Yes  [x]  No     Date of Patient follow up with Physician: 2023    Is this a Progress Report:     []  Yes  [x]  No      If Yes:  Date Range for reporting period:  Initial Eval: 2023  Beginnin2023 --- Endin2023  Beginnin/3/2023 --- Endin/3/2023    Progress report will be due (10 Rx or 30 days whichever is less): 8739     Recertification will be due (POC Duration  / 90 days whichever is less): 2023      Visit # Insurance Allowable Auth Required   In Person Eval +19 24 visits 23-23 [x]  Yes     []  No    Tele Health -  []  Yes     []  No    Total Eval+ 19       Functional Test Score: LEFS :85 % (Total: )  Date assessed: 2023  LEFS:  63% (Total:  )     Date:  5/3/2023      Latex Allergy:  [x]NO      []YES    Preferred Language for Healthcare:   [x]English       []other:    Pain level:  2/10     SUBJECTIVE: Doing well. No new complaints.       OBJECTIVE: See eval  Observation: Lacking 7 to 115 2023; Lacking 5 to 117 °   2023: PROM Lacking 5 ° to 124 °   Test measurements:      RESTRICTIONS/PRECAUTIONS: CP, Anxiety,Depression, muscle spasms     Exercises/Interventions:   Therapeutic Ex (37320)  Therapeutic Activity (89500)  NMR re-education (01200) Sets/Reps Notes/CUES   Bike 5' L5         Supine

## 2023-06-22 ENCOUNTER — HOSPITAL ENCOUNTER (OUTPATIENT)
Dept: PHYSICAL THERAPY | Age: 43
Setting detail: THERAPIES SERIES
Discharge: HOME | End: 2023-06-22
Payer: COMMERCIAL

## 2023-06-22 PROCEDURE — 97112 NEUROMUSCULAR REEDUCATION: CPT | Performed by: PHYSICAL THERAPY ASSISTANT

## 2023-06-22 PROCEDURE — 97110 THERAPEUTIC EXERCISES: CPT | Performed by: PHYSICAL THERAPY ASSISTANT

## 2023-06-22 NOTE — FLOWSHEET NOTE
Seated Knee Flexion AAROM  - 2-3 x daily - 7 x weekly - 1 sets - 10 reps - 10\" hold  - Standing Weight Shift  - 1 x daily - 7 x weekly - 1 sets - 20 reps  - Standing March with Counter Support  - 1-2 x daily - 7 x weekly - 2 sets - 10 reps  - Standing Alternating Knee Flexion  - 1-2 x daily - 7 x weekly - 2 sets - 10 reps             Patient Education 10' Pt education with HEP and progression of PT along with compliance with HEP to aide with formal PT for optimal outcomes. Therapeutic Exercise and NMR EXR  [x] (86551) Provided verbal/tactile cueing for activities related to strengthening, flexibility, endurance, ROM for improvements in LE, proximal hip, and core control with self care, mobility, lifting, ambulation. [x] (37451) Provided verbal/tactile cueing for activities related to improving balance, coordination, kinesthetic sense, posture, motor skill, proprioception to assist with LE, proximal hip, and core control in self-care, mobility, lifting, ambulation and eccentric single leg control. NMR and Therapeutic Activities:    [x] (78134 or 79735) Provided verbal/tactile cueing for activities related to improving balance, coordination, kinesthetic sense, posture, motor skill, proprioception and motor activation to allow for proper function of core, proximal hip and LE with self-care and ADLs and functional mobility.    [x] (94961) Gait Re-education- Provided training and instruction to the patient for proper LE, core and proximal hip recruitment and positioning and eccentric body weight control with ambulation re-education including up and down stairs     Home Exercise Program:    [x] (88705) Reviewed/Progressed HEP activities related to strengthening, flexibility, endurance, ROM of core, proximal hip and LE for functional self-care, mobility, lifting and ambulation/stair navigation   [x] (06683) Reviewed/Progressed HEP activities related to improving balance, coordination, kinesthetic sense, posture,

## 2023-06-22 NOTE — CARE COORDINATION
[facility], [address] [place] [address] [place], [address]  Phone: [phone], [phone]      Otoniel Nuñez  1980      Diagnosis: Left Knee OA (M17.12) s/p TKA 3/15/2023           Treatment Diagnosis: Muscle Weakness (M62.81); Left Knee (X92.281)           Date of surgery: 3/15/2023      Description of Injury/Dx: Pt underwent L TKA on 3/15/2023. Reccomendations:     [ ]  No Restrictions           [ ] Light Activity          [ ]  Regular Activity of Daily Living        [ ] No Activity:__________________          [ x]  Other : Pt should use SPC for ambulation due to instability during ambulation due to R Knee pain and weakness, limiting patient overall functional status.  Pt awaiting gel injections in right knee which at earliest would be August. Should use Cane until R Knee improves          Return for Further Care: [x ] Yes      [ ] No        Treatment:     Continue with KELLY Pierce PT,MPT,ATC, cert DN

## 2023-06-27 ENCOUNTER — HOSPITAL ENCOUNTER (OUTPATIENT)
Dept: PHYSICAL THERAPY | Age: 43
Setting detail: THERAPIES SERIES
Discharge: HOME | End: 2023-06-27
Payer: COMMERCIAL

## 2023-06-27 PROCEDURE — 97110 THERAPEUTIC EXERCISES: CPT | Performed by: PHYSICAL THERAPY ASSISTANT

## 2023-06-27 PROCEDURE — 97140 MANUAL THERAPY 1/> REGIONS: CPT | Performed by: PHYSICAL THERAPY ASSISTANT

## 2023-06-29 ENCOUNTER — HOSPITAL ENCOUNTER (OUTPATIENT)
Dept: PHYSICAL THERAPY | Age: 43
Setting detail: THERAPIES SERIES
Discharge: HOME | End: 2023-06-29
Payer: COMMERCIAL

## 2023-06-29 PROCEDURE — 97110 THERAPEUTIC EXERCISES: CPT | Performed by: PHYSICAL THERAPY ASSISTANT

## 2023-06-29 PROCEDURE — 97140 MANUAL THERAPY 1/> REGIONS: CPT | Performed by: PHYSICAL THERAPY ASSISTANT

## 2023-07-26 DIAGNOSIS — I10 ESSENTIAL HYPERTENSION: ICD-10-CM

## 2023-07-26 RX ORDER — METOPROLOL SUCCINATE 100 MG/1
TABLET, EXTENDED RELEASE ORAL
Qty: 28 TABLET | Refills: 1 | Status: SHIPPED | OUTPATIENT
Start: 2023-07-26

## 2023-08-21 ENCOUNTER — OFFICE VISIT (OUTPATIENT)
Dept: FAMILY MEDICINE CLINIC | Age: 43
End: 2023-08-21

## 2023-08-21 VITALS
WEIGHT: 276 LBS | DIASTOLIC BLOOD PRESSURE: 78 MMHG | SYSTOLIC BLOOD PRESSURE: 122 MMHG | BODY MASS INDEX: 47.38 KG/M2 | OXYGEN SATURATION: 96 % | HEART RATE: 86 BPM | TEMPERATURE: 98.6 F

## 2023-08-21 DIAGNOSIS — G80.8 OTHER CEREBRAL PALSY (HCC): ICD-10-CM

## 2023-08-21 DIAGNOSIS — Z96.652 S/P TOTAL KNEE ARTHROPLASTY, LEFT: ICD-10-CM

## 2023-08-21 DIAGNOSIS — M54.50 ACUTE RIGHT-SIDED LOW BACK PAIN WITHOUT SCIATICA: Primary | ICD-10-CM

## 2023-08-21 RX ORDER — METHYLPREDNISOLONE SODIUM SUCCINATE 125 MG/2ML
125 INJECTION, POWDER, LYOPHILIZED, FOR SOLUTION INTRAMUSCULAR; INTRAVENOUS ONCE
Status: COMPLETED | OUTPATIENT
Start: 2023-08-21 | End: 2023-08-21

## 2023-08-21 RX ORDER — CARIPRAZINE 1.5 MG/1
1 CAPSULE, GELATIN COATED ORAL DAILY
COMMUNITY
Start: 2023-08-14

## 2023-08-21 RX ORDER — METHYLPREDNISOLONE 4 MG/1
TABLET ORAL
Qty: 1 KIT | Refills: 0 | Status: SHIPPED | OUTPATIENT
Start: 2023-08-21 | End: 2023-08-27

## 2023-08-21 RX ADMIN — METHYLPREDNISOLONE SODIUM SUCCINATE 125 MG: 125 INJECTION, POWDER, LYOPHILIZED, FOR SOLUTION INTRAMUSCULAR; INTRAVENOUS at 16:44

## 2023-08-21 NOTE — PROGRESS NOTES
arthralgias, joint swelling and neck pain. Skin: Negative. Neurological:  Negative for dizziness, tremors, light-headedness and headaches. Psychiatric/Behavioral:  Negative for decreased concentration, dysphoric mood, self-injury, sleep disturbance and suicidal ideas. The patient is not nervous/anxious. Current Outpatient Medications on File Prior to Visit   Medication Sig Dispense Refill    VRAYLAR 1.5 MG capsule Take 1 capsule by mouth daily      metoprolol succinate (TOPROL XL) 100 MG extended release tablet TAKE ONE TABLET BY MOUTH EVERY DAY 28 tablet 1    methocarbamol (ROBAXIN) 500 MG tablet Take 1 tablet by mouth 4 times daily for 10 days 40 tablet 0    acetaminophen (TYLENOL) 325 MG tablet Take 2 tablets by mouth in the morning and 2 tablets at noon and 2 tablets in the evening and 2 tablets before bedtime. 120 tablet 3    pantoprazole (PROTONIX) 40 MG tablet Take 1 tablet by mouth every morning (before breakfast) 28 tablet 11    hydrOXYzine pamoate (VISTARIL) 25 MG capsule Take 1 capsule by mouth 3 times daily as needed      busPIRone (BUSPAR) 15 MG tablet Take 15 mg by mouth 2 in am; 1 qhs      lamoTRIgine (LAMICTAL) 200 MG tablet Take 1 tablet by mouth nightly      prazosin (MINIPRESS) 2 MG capsule Take 1 capsule by mouth nightly      escitalopram (LEXAPRO) 20 MG tablet TAKE ONE TABLET BY MOUTH DAILY 30 tablet 3    vitamin D (ERGOCALCIFEROL) 1.25 MG (52060 UT) CAPS capsule Take 1 capsule by mouth once a week (Patient not taking: Reported on 8/21/2023) 4 capsule 0     No current facility-administered medications on file prior to visit.      Allergies   Allergen Reactions    Sulfa Antibiotics     Amoxicillin Rash    Ultram [Tramadol] Nausea And Vomiting    Vicodin [Hydrocodone-Acetaminophen] Nausea And Vomiting     Past Medical History:   Diagnosis Date    Bipolar disorder ([address])     Cerebral palsy ([address])     legs;mainly right    Depression     Hypertension     PONV (postoperative nausea and

## 2023-08-27 ASSESSMENT — ENCOUNTER SYMPTOMS
COUGH: 0
CONSTIPATION: 0
BLOOD IN STOOL: 0
SHORTNESS OF BREATH: 0
CHEST TIGHTNESS: 0
BACK PAIN: 1
DIARRHEA: 0

## 2023-09-05 ENCOUNTER — OFFICE VISIT (OUTPATIENT)
Dept: ORTHOPEDIC SURGERY | Age: 43
End: 2023-09-05

## 2023-09-05 DIAGNOSIS — G80.9 CEREBRAL PALSY, UNSPECIFIED TYPE (HCC): ICD-10-CM

## 2023-09-05 DIAGNOSIS — M17.11 PRIMARY LOCALIZED OSTEOARTHRITIS OF RIGHT KNEE: Primary | ICD-10-CM

## 2023-09-05 RX ORDER — MELOXICAM 15 MG/1
15 TABLET ORAL DAILY PRN
Qty: 90 TABLET | Refills: 1 | Status: SHIPPED | OUTPATIENT
Start: 2023-09-05

## 2023-09-05 RX ORDER — LIDOCAINE HYDROCHLORIDE 10 MG/ML
20 INJECTION, SOLUTION INFILTRATION; PERINEURAL ONCE
Status: COMPLETED | OUTPATIENT
Start: 2023-09-05 | End: 2023-09-05

## 2023-09-05 RX ORDER — TRIAMCINOLONE ACETONIDE 40 MG/ML
40 INJECTION, SUSPENSION INTRA-ARTICULAR; INTRAMUSCULAR ONCE
Status: COMPLETED | OUTPATIENT
Start: 2023-09-05 | End: 2023-09-05

## 2023-09-05 RX ORDER — METHOCARBAMOL 750 MG/1
750 TABLET, FILM COATED ORAL 4 TIMES DAILY
Qty: 40 TABLET | Refills: 0 | Status: SHIPPED | OUTPATIENT
Start: 2023-09-05 | End: 2023-09-19

## 2023-09-05 RX ADMIN — TRIAMCINOLONE ACETONIDE 40 MG: 40 INJECTION, SUSPENSION INTRA-ARTICULAR; INTRAMUSCULAR at 16:23

## 2023-09-05 RX ADMIN — LIDOCAINE HYDROCHLORIDE 20 ML: 10 INJECTION, SOLUTION INFILTRATION; PERINEURAL at 16:22

## 2023-09-05 NOTE — PROGRESS NOTES
History: 51-year-old female with history of cerebral palsy known to me for left total knee replacement on March 15, 2023. She has done an extensive amount of physical therapy but did not stop this in July. She was also taking Robaxin initially postoperatively but has discontinued this and wondering if she wants to get back on it long-term. She is also having right knee pain. She also feels like she \"has forgotten how to walk\" and having difficulty picking up legs again during ambulation. Exam:     Upon observing her gait she has a shuffling type gait and really has very limited hip flexion during gait process. She does walk with a stable and level pelvis. She appears to have some valgus instability of her right knee during ambulation the left knee appears stable. Incision over the left knee is well-healed without erythema or drainage and range of motion is 0-1 20. There is no ligamentous instability or neurovascular compromise distally. Imaging: No new today previous imaging shows stabilization of left knee press-fit implants. The right knee x-rays show severe patellofemoral arthritis and medial lateral joint space narrowing as well. Assessment: 51-year-old female with cerebral palsy, status post left knee replacement and with right knee arthritis and gait difficulties. Plan: Encouraged with the overall function and range of motion of her left knee. She does continue to have significant gait abnormalities as outlined above. I will resume her muscle relaxer and placing referral for physical therapy for gait training specifically which should be more helpful now that we have done a right knee steroid injection. If the right knee steroid injection is not helpful enough we can submit for viscosupplementation. Would also like to refer her to PM and R to establish care given the severe muscle spasticity and gait difficulties. Follow-up with me as needed.       Dale Ibarra MD

## 2023-09-05 NOTE — PATIENT INSTRUCTIONS
Tatum Nicolas.  [facility]   [address] [address], [address] [place] [address]  [phone]

## 2023-09-06 ENCOUNTER — TELEPHONE (OUTPATIENT)
Dept: ORTHOPEDIC SURGERY | Age: 43
End: 2023-09-06

## 2023-09-06 NOTE — TELEPHONE ENCOUNTER
Other PATIENT IS CALLING IN AND TERRY ROMANO HAS RETIRED. SHE WOULD LIKE TO KNOW WHO ELSE SHE CAN BE REFERRED TOO.  [address] [address] [phone]

## 2023-09-08 DIAGNOSIS — G80.9 CEREBRAL PALSY, UNSPECIFIED TYPE (HCC): Primary | ICD-10-CM

## 2023-09-12 ENCOUNTER — TELEPHONE (OUTPATIENT)
Dept: ORTHOPEDIC SURGERY | Age: 43
End: 2023-09-12

## 2023-09-12 ENCOUNTER — TELEPHONE (OUTPATIENT)
Dept: FAMILY MEDICINE CLINIC | Age: 43
End: 2023-09-12

## 2023-09-12 DIAGNOSIS — G80.8 OTHER CEREBRAL PALSY (HCC): Primary | ICD-10-CM

## 2023-09-12 NOTE — TELEPHONE ENCOUNTER
Gave pt several options for rheumatology consult. Emmanuels      EXTERNAL RHEUMATOLOGY REFERRALS:      Ced Ford MD    [address] [facility]  Phone: [phone]      Gustavo Becker MD    [address] [place] [address], [address] [place], [place] [address]  Phone: [phone]    Rheumatologists are located at [facility], [facility] (near [place]), [facility]. Call [phone] or [phone] for appointments.

## 2023-09-12 NOTE — TELEPHONE ENCOUNTER
Faxing Patient [address] Name: DR Bobby Duff RHEUMATOLOGY  Contact Name: Obed Daugherty Number: [phone]  Facility Fax Number: [phone]    PT CALLED TO HAVE CLINICAL FAX REFERRAL TO RHEUMATOLOGY TO SCHEDULE APPT WITH OFFICE.      PLEASE ADVISE

## 2023-09-12 NOTE — TELEPHONE ENCOUNTER
ADVISED PT THAT SHE WAS REFERRED TO PM AND R DOCTOR  DR GALE IS WHO SHE NEEDS TO SEE AT [facility]    GAVE HER INFORMATION AND FAXED THE ORDER FOR REFERRAL.  INDRA

## 2023-09-12 NOTE — TELEPHONE ENCOUNTER
Patient states that her ortho doctor has referred her to physical medicine, was told by someone at the place for physical medicine that they would need a referral from her PCP for her to be able to see the physical medicine part of things that without it from [facility] they would only schedule her with the neuro side of things. Please advise.        Can be faxed to [phone]

## 2023-09-12 NOTE — TELEPHONE ENCOUNTER
Per patient yes for Physical medicine and rehabilitation and to see Dr. Jacqueline Landaverde at [facility] brain and spine clinic.

## 2023-09-12 NOTE — TELEPHONE ENCOUNTER
General Question     Subject: RHEUMATOLOGY REFERRAL   Patient and /or Facility Request: Gonzalez Stark  Contact Number: [phone]    PATIENT CALLED IN TO SEE SHE CAN GET AN REFERRAL TO RHEUMATOLOGY IN [address] FOR HER PETER KNEE. Katie Freire     PLEASE ADVISE

## 2023-09-14 ENCOUNTER — TELEPHONE (OUTPATIENT)
Dept: ORTHOPEDIC SURGERY | Age: 43
End: 2023-09-14

## 2023-09-14 NOTE — TELEPHONE ENCOUNTER
General Question     Subject: VERIFY INFO ABOUT REF. TO [place]   Patient and /or Facility Request: Michael Ricci  Contact Number: [phone]    PT CALLING IN REGARDING REF. THAT WAS SENT OVER TO [place]. Fahadnio Fothergill MOORE NEEDS TO KNOW SPECIFICALLY WHAT SHE IS BEING SEEN FOR. THEY NEED TO KNOW IF SHE IS BEING SEEN FOR HER RT KNEE OR HER CRIBRAL PALSY. DUE TO HER INSURANCE REQUIRING TO HAVE IMAGING TO BE SEEN     PLEASE CALL BACK PT ONCE REF. IS UPDATED.

## 2023-09-18 NOTE — TELEPHONE ENCOUNTER
SPOKE WITH PT ITS REGARDING LEG SPACICITY AND MUSCLE AND HER GAIT - CEREBRAL PALSY.      SPOKE WITH PT. Keyon Che

## 2023-09-19 DIAGNOSIS — M17.11 PRIMARY LOCALIZED OSTEOARTHRITIS OF RIGHT KNEE: ICD-10-CM

## 2023-09-19 RX ORDER — METHOCARBAMOL 750 MG/1
TABLET, FILM COATED ORAL
Qty: 40 TABLET | Refills: 0 | Status: SHIPPED | OUTPATIENT
Start: 2023-09-19

## 2023-09-21 ENCOUNTER — TELEPHONE (OUTPATIENT)
Dept: ORTHOPEDIC SURGERY | Age: 43
End: 2023-09-21

## 2023-09-21 DIAGNOSIS — G80.9 CEREBRAL PALSY, UNSPECIFIED TYPE (HCC): Primary | ICD-10-CM

## 2023-09-21 NOTE — TELEPHONE ENCOUNTER
Per pt yoel only sees spine and neck will not see her for   Cerebral palsy?     Please advise other PM/R md for treatment    Pt seen 9/5/2023    Has phys therapy beginning 10/2 1st visit. Pamela Kraus

## 2023-09-25 ENCOUNTER — TELEPHONE (OUTPATIENT)
Dept: ORTHOPEDIC SURGERY | Age: 43
End: 2023-09-25

## 2023-09-25 NOTE — TELEPHONE ENCOUNTER
[employer]  (QTY 1)   RIGHT KNEE        APPROVED FOR BUY & BILL   APPROVED #  [id number]. DATES:  9/13/23 - 3/13/24   PER FAX FROM [employer].      Scheduled for 10/3/2023 [facility]

## 2023-10-02 ENCOUNTER — HOSPITAL ENCOUNTER (OUTPATIENT)
Dept: PHYSICAL THERAPY | Age: 43
Setting detail: THERAPIES SERIES
Discharge: HOME | End: 2023-10-02
Payer: COMMERCIAL

## 2023-10-02 DIAGNOSIS — M62.81 MUSCLE WEAKNESS OF LOWER EXTREMITY: Primary | ICD-10-CM

## 2023-10-02 NOTE — FLOWSHEET NOTE
[address] [place] [address]    Physical Therapy  Cancellation/No-show Note  Patient Name:  Gurjit Conde  :  1980   Date:  10/2/2023    Cancelled visits to date: 1  No-shows to date: 0    For today's appointment patient:  []  Cancelled  []  Rescheduled appointment  []  No-show     Reason given by patient:  []  Patient ill  []  Conflicting appointment  []  No transportation    []  Conflict with work  []  No reason given  [x]  Other:     Comments:  Pt has a migraine      Phone call information:   [x]  Phone call made today to patient. []  Patient answered, conversation as follows:    []  Patient did not answer. []  Phone call not needed - pt contacted us to cancel and provided reason for cancellation.      Electronically signed by:  Jeny Epps, PT, MPT,ATC, cert DN

## 2023-10-03 ENCOUNTER — OFFICE VISIT (OUTPATIENT)
Dept: ORTHOPEDIC SURGERY | Age: 43
End: 2023-10-03

## 2023-10-03 DIAGNOSIS — M17.11 PRIMARY LOCALIZED OSTEOARTHRITIS OF RIGHT KNEE: Primary | ICD-10-CM

## 2023-10-03 NOTE — PROGRESS NOTES
History: 40-year-old female presents for follow-up of right knee arthritis also known to me for left total knee replacement back in March. She is also noted to have some gait difficulties related to cerebral palsy. We have referred her to [facility] and physical therapy for gait training and neuromuscular evaluation. Recent steroid injection has not been helpful for the right knee so please obtain insurance approval for right knee viscosupplementation which has been previously helpful. Exam: see previous     Injection: Risks benefits limitations and alternatives to right knee injection were discussed with patient who wished to proceed. Under aseptic technique Durolane was injected into the right knee without difficulty. There was no complications in the patient tolerated the procedure well. Assessment: 38 y/o F s/p L TKA, R knee OA, gait difficulties w/ CP    Plan: Durolane today. Has PT and PMR eval scheduled. F/u PRN.     Lkashmi Bolden MD

## 2023-10-04 ENCOUNTER — HOSPITAL ENCOUNTER (OUTPATIENT)
Dept: PHYSICAL THERAPY | Age: 43
Setting detail: THERAPIES SERIES
Discharge: HOME | End: 2023-10-04
Payer: COMMERCIAL

## 2023-10-04 DIAGNOSIS — R26.9 ABNORMAL GAIT DUE TO MUSCLE WEAKNESS: Primary | ICD-10-CM

## 2023-10-04 DIAGNOSIS — M62.81 ABNORMAL GAIT DUE TO MUSCLE WEAKNESS: Primary | ICD-10-CM

## 2023-10-04 DIAGNOSIS — R25.2 SPASTICITY: ICD-10-CM

## 2023-10-04 PROCEDURE — 97161 PT EVAL LOW COMPLEX 20 MIN: CPT

## 2023-10-04 PROCEDURE — 97112 NEUROMUSCULAR REEDUCATION: CPT

## 2023-10-04 PROCEDURE — 97140 MANUAL THERAPY 1/> REGIONS: CPT

## 2023-10-04 PROCEDURE — 97110 THERAPEUTIC EXERCISES: CPT

## 2023-10-04 NOTE — FLOWSHEET NOTE
Signed by Jesse Viramontes, PT, MPT,ATC, cert DN               Date: 10/04/2023     Note: If patient does not return for scheduled/recommended follow up visits, this note will serve as a discharge from care along with the most recent update on progress.

## 2023-10-04 NOTE — PLAN OF CARE
hip hypomobility, Decreased core/proximal hip strength and neuromuscular control , Decreased LE functional strength , and Reduced balance/proprioceptive control     Functional Activity Limitations (from functional questionnaire and intake):  Reduced ability to tolerate prolonged functional positions  Reduced ability or difficulty with changes of positions or transfers between positions  Reduced ability to maintain good posture and demonstrate good body mechanics with sitting, bending, and lifting  Reduced ability to sleep  Reduced ability or tolerance with driving and/or computer work  Reduced ability to perform lifting, reaching, carrying tasks  Reduced ability to squat  Reduced ability to forward bend  Reduced ability to ambulate prolonged functional periods/distances/surfaces  Reduced ability to ascend/descend stairs  difficulty with self care    Participation Restrictions:   Reduced participation in self care  Reduced participation in home management   Reduced participation in work activities  Reduced participation in social activities  Reduced participation in sports/recreation    Classification :   Signs/symptoms consistent with functional hip weakness/NMR control     Barriers to/and or personal factors that will affect rehab potential:   None noted    Prognosis/Rehab Potential:  [] Excellent     [x] Good     [] Fair     [] Poor         Tolerance of evaluation/treatment:   [] Excellent     [x] Good     [] Fair     [] Poor      Physical Therapy Evaluation Complexity Justification  [x] A history of present problem and 1-2 personal factors and/or co-morbidities that impact the plan of care  [x] A total of 1-2 elements  found upon examination of body systems using standardized tests and measures addressing any of the following: body structures, functions (impairments), activity limitations, and/or participation restrictions  [x] A clinical presentation with stable and/or uncomplicated characteristics   [x]

## 2023-10-05 ENCOUNTER — APPOINTMENT (OUTPATIENT)
Dept: PHYSICAL THERAPY | Age: 43
End: 2023-10-05
Payer: COMMERCIAL

## 2023-10-11 ENCOUNTER — HOSPITAL ENCOUNTER (OUTPATIENT)
Dept: PHYSICAL THERAPY | Age: 43
Setting detail: THERAPIES SERIES
Discharge: HOME | End: 2023-10-11
Payer: COMMERCIAL

## 2023-10-11 PROCEDURE — 97110 THERAPEUTIC EXERCISES: CPT | Performed by: PHYSICAL THERAPY ASSISTANT

## 2023-10-11 PROCEDURE — 97112 NEUROMUSCULAR REEDUCATION: CPT | Performed by: PHYSICAL THERAPY ASSISTANT

## 2023-10-11 PROCEDURE — 97140 MANUAL THERAPY 1/> REGIONS: CPT | Performed by: PHYSICAL THERAPY ASSISTANT

## 2023-10-11 NOTE — FLOWSHEET NOTE
[address] and Therapy [address] [place] [address] [address] [facility] [place], Desmond Aranda  office: [phone] fax: [phone]      Physical Therapy: TREATMENT/PROGRESS NOTE   Patient: Maxime Schmidt (2 y.o. female)   Treatment Date: 10/11/2023   :  1980 MRN: [id number]   Visit #: 2   Insurance Allowable Auth Needed   30 per year  Used 23 prior episode [x]Yes    []No    Insurance: Payor: Joel Kwan / Plan: Asha Cox / Product Type: *No Product type* /   Insurance ID: [id number] - (Medicaid Managed)  Secondary Insurance (if applicable):    Treatment Diagnosis:   No diagnosis found. Medical Diagnosis:    Cerebral palsy, unspecified type ([address] [facility]) [G80.9]   Referring Physician: Kimmy Siegle MD  PCP: HENRY Lazar CNP                             Plan of care signed (Y/N):     Date of Patient follow up with Physician:      Progress Report/POC: NO  POC update due: 11/3/2023 (OR 10 visits /OR [address], whichever is less)    Latex Allergy:  [x]NO      []YES    Preferred Language for Healthcare:   [x]English       []other:    SUBJECTIVE EXAMINATION     Patient Report/Comments: Has not noticed much change from gel injection on the 3rd. Did have a fall a few days ago at home - was walking and fell forward down onto bilateral knees and onto chin. Left knee was good but right knee was really painful and has been more sore since that episode.      OBJECTIVE EXAMINATION     Observation:     Test measurements: see eval     Test used Initial score 10/11/2023   Pain Summary VAS 7/10 Left knee - 0  Right knee - 7/10   Functional questionnaire LEFS 35/80  56%    ROM        See eval                Strength  See eval                        RESTRICTIONS/PRECAUTIONS: CP    Exercises/Interventions:     Therapeutic Ex (66074)  resistance Sets/time Reps Notes/Cues/Progressions   LAQ  1# left only 2 10 R, L     Supine marches  1# left only  2 10 R, L     Glut set  2 10 x    Quad

## 2023-10-12 DIAGNOSIS — I10 ESSENTIAL HYPERTENSION: ICD-10-CM

## 2023-10-12 RX ORDER — METOPROLOL SUCCINATE 100 MG/1
TABLET, EXTENDED RELEASE ORAL
Qty: 28 TABLET | Refills: 5 | Status: SHIPPED | OUTPATIENT
Start: 2023-10-12

## 2023-10-20 ENCOUNTER — OFFICE VISIT (OUTPATIENT)
Dept: FAMILY MEDICINE CLINIC | Age: 43
End: 2023-10-20
Payer: COMMERCIAL

## 2023-10-20 ENCOUNTER — HOSPITAL ENCOUNTER (OUTPATIENT)
Dept: PHYSICAL THERAPY | Age: 43
Setting detail: THERAPIES SERIES
End: 2023-10-20
Payer: COMMERCIAL

## 2023-10-20 VITALS
WEIGHT: 278.6 LBS | OXYGEN SATURATION: 97 % | RESPIRATION RATE: 16 BRPM | BODY MASS INDEX: 47.56 KG/M2 | HEART RATE: 83 BPM | DIASTOLIC BLOOD PRESSURE: 78 MMHG | HEIGHT: 64 IN | TEMPERATURE: 98.1 F | SYSTOLIC BLOOD PRESSURE: 116 MMHG

## 2023-10-20 DIAGNOSIS — H60.502 ACUTE OTITIS EXTERNA OF LEFT EAR, UNSPECIFIED TYPE: ICD-10-CM

## 2023-10-20 DIAGNOSIS — R11.2 NAUSEA AND VOMITING, UNSPECIFIED VOMITING TYPE: Primary | ICD-10-CM

## 2023-10-20 PROCEDURE — 3078F DIAST BP <80 MM HG: CPT

## 2023-10-20 PROCEDURE — 3074F SYST BP LT 130 MM HG: CPT

## 2023-10-20 PROCEDURE — G8484 FLU IMMUNIZE NO ADMIN: HCPCS

## 2023-10-20 PROCEDURE — 4130F TOPICAL PREP RX AOE: CPT

## 2023-10-20 PROCEDURE — 99214 OFFICE O/P EST MOD 30 MIN: CPT

## 2023-10-20 PROCEDURE — G8417 CALC BMI ABV UP PARAM F/U: HCPCS

## 2023-10-20 PROCEDURE — G8427 DOCREV CUR MEDS BY ELIG CLIN: HCPCS

## 2023-10-20 PROCEDURE — 1036F TOBACCO NON-USER: CPT

## 2023-10-20 RX ORDER — BUSPIRONE HYDROCHLORIDE 10 MG/1
TABLET ORAL
COMMUNITY
Start: 2023-10-18

## 2023-10-20 RX ORDER — OFLOXACIN 3 MG/ML
3 SOLUTION/ DROPS OPHTHALMIC 4 TIMES DAILY
Qty: 5 ML | Refills: 0 | Status: SHIPPED | OUTPATIENT
Start: 2023-10-20 | End: 2023-10-30

## 2023-10-20 RX ORDER — CARIPRAZINE 4.5 MG/1
CAPSULE, GELATIN COATED ORAL
COMMUNITY
Start: 2023-10-18

## 2023-10-20 RX ORDER — PRAZOSIN HYDROCHLORIDE 5 MG/1
CAPSULE ORAL
COMMUNITY
Start: 2023-10-18

## 2023-10-20 RX ORDER — MECLIZINE HCL 12.5 MG/1
12.5 TABLET ORAL 3 TIMES DAILY PRN
Qty: 15 TABLET | Refills: 0 | Status: SHIPPED | OUTPATIENT
Start: 2023-10-20 | End: 2023-10-30

## 2023-10-20 RX ORDER — PRAZOSIN HYDROCHLORIDE 1 MG/1
CAPSULE ORAL
COMMUNITY
Start: 2023-10-18

## 2023-10-20 RX ORDER — ONDANSETRON 4 MG/1
4 TABLET, ORALLY DISINTEGRATING ORAL 3 TIMES DAILY PRN
Qty: 21 TABLET | Refills: 0 | Status: SHIPPED | OUTPATIENT
Start: 2023-10-20

## 2023-10-20 RX ORDER — BACLOFEN 10 MG/1
TABLET ORAL
COMMUNITY
Start: 2023-10-04

## 2023-10-20 ASSESSMENT — ENCOUNTER SYMPTOMS
RHINORRHEA: 0
SWOLLEN GLANDS: 0
VOMITING: 1
ANAL BLEEDING: 0
CONSTIPATION: 0
NAUSEA: 1
EYE DISCHARGE: 0
STRIDOR: 0
PHOTOPHOBIA: 0
DIARRHEA: 0
VISUAL CHANGE: 0

## 2023-10-20 NOTE — FLOWSHEET NOTE
[address] [place] [address]    Physical Therapy  Cancellation/No-show Note  Patient Name:  Malou Mckay  :  1980   Date:  10/20/2023    Cancelled visits to date: 2  No-shows to date: 0    For today's appointment patient:  [x]  Cancelled  []  Rescheduled appointment  []  No-show     Reason given by patient:  [x]  Patient ill  []  Conflicting appointment  []  No transportation    []  Conflict with work  []  No reason given  []  Other:     Comments:      Phone call information:   []  Phone call made today to patient. []  Patient answered, conversation as follows:    []  Patient did not answer. [x]  Phone call not needed - pt contacted us to cancel and provided reason for cancellation.      Electronically signed by:  Susannah Romero PTA,

## 2023-10-20 NOTE — PROGRESS NOTES
[facility]  10/20/2023    Linnette Lyons (:  1980) is a 37 y.o. female, here for evaluation of the following medical concerns:    Chief Complaint   Patient presents with    Illness     Onset x 3 weeks. Took otc pepto with no relief    Nausea & Vomiting    Headache        ASSESSMENT/ PLAN  1. Nausea and vomiting, unspecified vomiting type  -Suspected to be from chronic left ear inflammation. I think that this can be causing symptoms of headache, vertigo and possible BPPV. -He did have a positive Bellevue-Hallpike maneuver  -Zofran as needed  -Referral to ENT  -Meclizine for motion sickness  - ondansetron (ZOFRAN-ODT) 4 MG disintegrating tablet; Take 1 tablet by mouth 3 times daily as needed for Nausea or Vomiting  Dispense: 21 tablet; Refill: Dea King MD, Otolaryngology, [facility]    2. Acute otitis externa of left ear, unspecified type  -Treat with ofloxacin drops  -Apparently this has been chronic for over a year now. - meclizine (ANTIVERT) 12.5 MG tablet; Take 1 tablet by mouth 3 times daily as needed for Nausea (intermittent nausea)  Dispense: 15 tablet; Refill: 0  - Breanna Hutson MD, Otolaryngology, [facility]  - ofloxacin (OCUFLOX) 0.3 % solution; Place 3 drops in ear(s) 4 times daily for 10 days  Dispense: 5 mL; Refill: 0           No follow-ups on file. HPI  Patient seen in office today for chief complaint of nausea abnormal capacity. She reports new motion sickness. She reports nausea that nausea and dizzy spells are worse with movement especially in the car. She reports that she is having vomiting mainly in the car. Failed Pepto-Bismol. She also reports that she has had ongoing chronic left ear itching for over a year now. Denies pregnancy. Denies any visual disturbances, bowel or bladder changes. Illness  The current episode started 1 to 4 weeks ago. The problem occurs intermittently. The problem is unchanged. The pain is mild.  Associated

## 2023-10-29 NOTE — PROGRESS NOTES
getting the Q-tip removed from the right ear should also help  - PT vestibular rehab; Future    2. Foreign body of right ear, initial encounter  Q-tip was removed from the right ear under microscopic visualization, TM was intact with no evidence of trauma  - UT RMVL FB XTRNL AUDITORY CANAL W/O ANES    3. Impacted cerumen of left ear  Cerumen was removed from the left ear TM intact  - UT REMOVAL IMPACTED CERUMEN INSTRUMENTATION UNILAT    4. Hearing loss of right ear, unspecified hearing loss type  After removal of the Q-tip she states that the hearing returned to normal.  She did not wish to have a hearing test.    Follow-up as needed. Alanis Numbers, DO  10/30/2023    Medical Decision Making:   The following items were considered in medical decision making:  Independent review of images  Review / order clinical lab tests  Review / order radiology tests  Decision to obtain old records

## 2023-10-30 ENCOUNTER — OFFICE VISIT (OUTPATIENT)
Dept: ENT CLINIC | Age: 43
End: 2023-10-30
Payer: COMMERCIAL

## 2023-10-30 VITALS — RESPIRATION RATE: 16 BRPM | BODY MASS INDEX: 47.46 KG/M2 | HEIGHT: 64 IN | WEIGHT: 278 LBS

## 2023-10-30 DIAGNOSIS — R42 DIZZINESS: Primary | ICD-10-CM

## 2023-10-30 DIAGNOSIS — H61.22 IMPACTED CERUMEN OF LEFT EAR: ICD-10-CM

## 2023-10-30 DIAGNOSIS — T16.1XXA FOREIGN BODY OF RIGHT EAR, INITIAL ENCOUNTER: ICD-10-CM

## 2023-10-30 DIAGNOSIS — H91.91 HEARING LOSS OF RIGHT EAR, UNSPECIFIED HEARING LOSS TYPE: ICD-10-CM

## 2023-10-30 PROCEDURE — G8484 FLU IMMUNIZE NO ADMIN: HCPCS | Performed by: STUDENT IN AN ORGANIZED HEALTH CARE EDUCATION/TRAINING PROGRAM

## 2023-10-30 PROCEDURE — 69210 REMOVE IMPACTED EAR WAX UNI: CPT | Performed by: STUDENT IN AN ORGANIZED HEALTH CARE EDUCATION/TRAINING PROGRAM

## 2023-10-30 PROCEDURE — G8417 CALC BMI ABV UP PARAM F/U: HCPCS | Performed by: STUDENT IN AN ORGANIZED HEALTH CARE EDUCATION/TRAINING PROGRAM

## 2023-10-30 PROCEDURE — 1036F TOBACCO NON-USER: CPT | Performed by: STUDENT IN AN ORGANIZED HEALTH CARE EDUCATION/TRAINING PROGRAM

## 2023-10-30 PROCEDURE — 69200 CLEAR OUTER EAR CANAL: CPT | Performed by: STUDENT IN AN ORGANIZED HEALTH CARE EDUCATION/TRAINING PROGRAM

## 2023-10-30 PROCEDURE — G8427 DOCREV CUR MEDS BY ELIG CLIN: HCPCS | Performed by: STUDENT IN AN ORGANIZED HEALTH CARE EDUCATION/TRAINING PROGRAM

## 2023-10-30 PROCEDURE — 99203 OFFICE O/P NEW LOW 30 MIN: CPT | Performed by: STUDENT IN AN ORGANIZED HEALTH CARE EDUCATION/TRAINING PROGRAM

## 2023-10-30 ASSESSMENT — ENCOUNTER SYMPTOMS
VOMITING: 0
RHINORRHEA: 0
EYE PAIN: 0
COUGH: 0
NAUSEA: 0
SHORTNESS OF BREATH: 0

## 2023-11-09 ENCOUNTER — NURSE ONLY (OUTPATIENT)
Dept: FAMILY MEDICINE CLINIC | Age: 43
End: 2023-11-09

## 2023-11-09 DIAGNOSIS — Z23 FLU VACCINE NEED: Primary | ICD-10-CM

## 2023-12-08 ENCOUNTER — TELEMEDICINE (OUTPATIENT)
Dept: FAMILY MEDICINE CLINIC | Age: 43
End: 2023-12-08
Payer: COMMERCIAL

## 2023-12-08 DIAGNOSIS — I10 ESSENTIAL HYPERTENSION: ICD-10-CM

## 2023-12-08 DIAGNOSIS — R25.1 TREMOR OF LEFT HAND: Primary | ICD-10-CM

## 2023-12-08 PROCEDURE — G8482 FLU IMMUNIZE ORDER/ADMIN: HCPCS | Performed by: NURSE PRACTITIONER

## 2023-12-08 PROCEDURE — G8427 DOCREV CUR MEDS BY ELIG CLIN: HCPCS | Performed by: NURSE PRACTITIONER

## 2023-12-08 PROCEDURE — 99213 OFFICE O/P EST LOW 20 MIN: CPT | Performed by: NURSE PRACTITIONER

## 2023-12-08 PROCEDURE — 1036F TOBACCO NON-USER: CPT | Performed by: NURSE PRACTITIONER

## 2023-12-08 PROCEDURE — G8417 CALC BMI ABV UP PARAM F/U: HCPCS | Performed by: NURSE PRACTITIONER

## 2023-12-08 RX ORDER — PROPRANOLOL HYDROCHLORIDE 80 MG/1
80 CAPSULE, EXTENDED RELEASE ORAL DAILY
Qty: 30 CAPSULE | Refills: 0 | Status: SHIPPED | OUTPATIENT
Start: 2023-12-08

## 2023-12-08 ASSESSMENT — PATIENT HEALTH QUESTIONNAIRE - PHQ9
SUM OF ALL RESPONSES TO PHQ QUESTIONS 1-9: 2
SUM OF ALL RESPONSES TO PHQ9 QUESTIONS 1 & 2: 2
7. TROUBLE CONCENTRATING ON THINGS, SUCH AS READING THE NEWSPAPER OR WATCHING TELEVISION: 0
3. TROUBLE FALLING OR STAYING ASLEEP: 0
SUM OF ALL RESPONSES TO PHQ QUESTIONS 1-9: 2
SUM OF ALL RESPONSES TO PHQ QUESTIONS 1-9: 2
6. FEELING BAD ABOUT YOURSELF - OR THAT YOU ARE A FAILURE OR HAVE LET YOURSELF OR YOUR FAMILY DOWN: 0
SUM OF ALL RESPONSES TO PHQ QUESTIONS 1-9: 2
9. THOUGHTS THAT YOU WOULD BE BETTER OFF DEAD, OR OF HURTING YOURSELF: 0
1. LITTLE INTEREST OR PLEASURE IN DOING THINGS: 1
2. FEELING DOWN, DEPRESSED OR HOPELESS: 1
5. POOR APPETITE OR OVEREATING: 0
10. IF YOU CHECKED OFF ANY PROBLEMS, HOW DIFFICULT HAVE THESE PROBLEMS MADE IT FOR YOU TO DO YOUR WORK, TAKE CARE OF THINGS AT HOME, OR GET ALONG WITH OTHER PEOPLE: 0
4. FEELING TIRED OR HAVING LITTLE ENERGY: 0
8. MOVING OR SPEAKING SO SLOWLY THAT OTHER PEOPLE COULD HAVE NOTICED. OR THE OPPOSITE, BEING SO FIGETY OR RESTLESS THAT YOU HAVE BEEN MOVING AROUND A LOT MORE THAN USUAL: 0

## 2023-12-08 ASSESSMENT — COLUMBIA-SUICIDE SEVERITY RATING SCALE - C-SSRS
3. HAVE YOU BEEN THINKING ABOUT HOW YOU MIGHT KILL YOURSELF?: NO
5. HAVE YOU STARTED TO WORK OUT OR WORKED OUT THE DETAILS OF HOW TO KILL YOURSELF? DO YOU INTEND TO CARRY OUT THIS PLAN?: NO
4. HAVE YOU HAD THESE THOUGHTS AND HAD SOME INTENTION OF ACTING ON THEM?: NO
7. DID THIS OCCUR IN THE LAST THREE MONTHS: NO

## 2023-12-08 NOTE — PROGRESS NOTES
Facial Asymmetry (Cranial nerve 7 motor function) (limited exam due to video visit)          [x] No gaze palsy        [] Abnormal -          Skin:        [x] No significant exanthematous lesions or discoloration noted on facial skin         [] Abnormal -            Psychiatric:       [x] Normal Affect [] Abnormal -        [x] No Hallucinations    Other pertinent observable physical exam findings:-         On this date 12/8/2023 I have spent 11 minutes reviewing previous notes, test results and face to face (virtual) with the patient discussing the diagnosis and importance of compliance with the treatment plan as well as documenting on the day of the visit.     --Randy Davis, HENRY - CNP

## 2023-12-29 ENCOUNTER — TELEPHONE (OUTPATIENT)
Dept: FAMILY MEDICINE CLINIC | Age: 43
End: 2023-12-29

## 2023-12-29 NOTE — TELEPHONE ENCOUNTER
Patient says that the propranolol 80 mg has not helped with the tremors.  Asking what is the next step

## 2023-12-29 NOTE — TELEPHONE ENCOUNTER
Have her try to 2 a day for 1 week and see if that improves. If not we will switch. Schedule appt for 1-2 weeks please so we can discuss.

## 2024-01-08 ENCOUNTER — OFFICE VISIT (OUTPATIENT)
Dept: FAMILY MEDICINE CLINIC | Age: 44
End: 2024-01-08
Payer: COMMERCIAL

## 2024-01-08 VITALS
WEIGHT: 281 LBS | BODY MASS INDEX: 48.23 KG/M2 | SYSTOLIC BLOOD PRESSURE: 118 MMHG | OXYGEN SATURATION: 96 % | TEMPERATURE: 98.6 F | DIASTOLIC BLOOD PRESSURE: 84 MMHG | HEART RATE: 77 BPM

## 2024-01-08 DIAGNOSIS — J02.9 SORE THROAT: Primary | ICD-10-CM

## 2024-01-08 DIAGNOSIS — R25.1 TREMOR OF LEFT HAND: ICD-10-CM

## 2024-01-08 DIAGNOSIS — R05.1 ACUTE COUGH: ICD-10-CM

## 2024-01-08 DIAGNOSIS — G80.9 CEREBRAL PALSY, UNSPECIFIED TYPE (HCC): ICD-10-CM

## 2024-01-08 DIAGNOSIS — F31.9 BIPOLAR DISEASE, CHRONIC (HCC): ICD-10-CM

## 2024-01-08 DIAGNOSIS — I10 PRIMARY HYPERTENSION: ICD-10-CM

## 2024-01-08 DIAGNOSIS — E55.9 VITAMIN D DEFICIENCY: ICD-10-CM

## 2024-01-08 DIAGNOSIS — J06.9 VIRAL URI: ICD-10-CM

## 2024-01-08 LAB
INFLUENZA A ANTIGEN, POC: NEGATIVE
INFLUENZA B ANTIGEN, POC: NEGATIVE
LOT EXPIRE DATE: NORMAL
LOT KIT NUMBER: NORMAL
SARS-COV-2, POC: NORMAL
VALID INTERNAL CONTROL: NORMAL
VENDOR AND KIT NAME POC: NORMAL

## 2024-01-08 PROCEDURE — 87428 SARSCOV & INF VIR A&B AG IA: CPT | Performed by: NURSE PRACTITIONER

## 2024-01-08 PROCEDURE — 99213 OFFICE O/P EST LOW 20 MIN: CPT | Performed by: NURSE PRACTITIONER

## 2024-01-08 PROCEDURE — 3079F DIAST BP 80-89 MM HG: CPT | Performed by: NURSE PRACTITIONER

## 2024-01-08 PROCEDURE — 1036F TOBACCO NON-USER: CPT | Performed by: NURSE PRACTITIONER

## 2024-01-08 PROCEDURE — G8427 DOCREV CUR MEDS BY ELIG CLIN: HCPCS | Performed by: NURSE PRACTITIONER

## 2024-01-08 PROCEDURE — G8417 CALC BMI ABV UP PARAM F/U: HCPCS | Performed by: NURSE PRACTITIONER

## 2024-01-08 PROCEDURE — G8482 FLU IMMUNIZE ORDER/ADMIN: HCPCS | Performed by: NURSE PRACTITIONER

## 2024-01-08 PROCEDURE — 3074F SYST BP LT 130 MM HG: CPT | Performed by: NURSE PRACTITIONER

## 2024-01-08 RX ORDER — PRIMIDONE 50 MG/1
50 TABLET ORAL 3 TIMES DAILY
Qty: 90 TABLET | Refills: 3 | Status: SHIPPED | OUTPATIENT
Start: 2024-01-08

## 2024-01-08 RX ORDER — ACETAMINOPHEN 500 MG
500 TABLET ORAL EVERY 6 HOURS PRN
Qty: 60 TABLET | Refills: 5 | Status: SHIPPED | OUTPATIENT
Start: 2024-01-08 | End: 2024-03-08

## 2024-01-08 RX ORDER — GUAIFENESIN AND DEXTROMETHORPHAN HYDROBROMIDE 600; 30 MG/1; MG/1
20 TABLET, EXTENDED RELEASE ORAL 2 TIMES DAILY PRN
Qty: 28 TABLET | Refills: 0 | Status: SHIPPED | OUTPATIENT
Start: 2024-01-08 | End: 2024-01-12 | Stop reason: DRUGHIGH

## 2024-01-08 ASSESSMENT — PATIENT HEALTH QUESTIONNAIRE - PHQ9
3. TROUBLE FALLING OR STAYING ASLEEP: 0
2. FEELING DOWN, DEPRESSED OR HOPELESS: 1
4. FEELING TIRED OR HAVING LITTLE ENERGY: 0
SUM OF ALL RESPONSES TO PHQ QUESTIONS 1-9: 2
SUM OF ALL RESPONSES TO PHQ QUESTIONS 1-9: 2
6. FEELING BAD ABOUT YOURSELF - OR THAT YOU ARE A FAILURE OR HAVE LET YOURSELF OR YOUR FAMILY DOWN: 0
9. THOUGHTS THAT YOU WOULD BE BETTER OFF DEAD, OR OF HURTING YOURSELF: 0
8. MOVING OR SPEAKING SO SLOWLY THAT OTHER PEOPLE COULD HAVE NOTICED. OR THE OPPOSITE, BEING SO FIGETY OR RESTLESS THAT YOU HAVE BEEN MOVING AROUND A LOT MORE THAN USUAL: 0
7. TROUBLE CONCENTRATING ON THINGS, SUCH AS READING THE NEWSPAPER OR WATCHING TELEVISION: 0
SUM OF ALL RESPONSES TO PHQ9 QUESTIONS 1 & 2: 2
5. POOR APPETITE OR OVEREATING: 0
SUM OF ALL RESPONSES TO PHQ QUESTIONS 1-9: 2
SUM OF ALL RESPONSES TO PHQ QUESTIONS 1-9: 2
1. LITTLE INTEREST OR PLEASURE IN DOING THINGS: 1
10. IF YOU CHECKED OFF ANY PROBLEMS, HOW DIFFICULT HAVE THESE PROBLEMS MADE IT FOR YOU TO DO YOUR WORK, TAKE CARE OF THINGS AT HOME, OR GET ALONG WITH OTHER PEOPLE: 1

## 2024-01-08 ASSESSMENT — ENCOUNTER SYMPTOMS
WHEEZING: 0
NAUSEA: 0
SORE THROAT: 1
DIARRHEA: 0
RHINORRHEA: 0
VOMITING: 0
COUGH: 0
SWOLLEN GLANDS: 1
SINUS PAIN: 1
ABDOMINAL PAIN: 0

## 2024-01-08 NOTE — PROGRESS NOTES
Refill: 3    5. Primary hypertension  Controlled today  Continue Metoprolol  Encouraged diet/ exercise changes  Annual labs ordered today  - CBC with Auto Differential; Future  - Comprehensive Metabolic Panel; Future  - LIPID PANEL; Future    6. Vitamin D deficiency  Updated labs ordered today  - Vitamin D 25 Hydroxy; Future    7. Bipolar disease, chronic (HCC)  Continue care with Psychiatry  Continue Lexapro, Vraylar, Prazosin, Lamictal  Annual labs ordered today    8. Cerebral palsy, unspecified type (HCC)  Monitor for now.       Return in about 6 weeks (around 2/19/2024) for tremor.    URI   This is a new problem. The problem has been gradually worsening. There has been no fever. Associated symptoms include congestion, sinus pain, sneezing, a sore throat and swollen glands. Pertinent negatives include no abdominal pain, chest pain, coughing, diarrhea, dysuria, ear pain, headaches, joint pain, joint swelling, nausea, neck pain, plugged ear sensation, rash, rhinorrhea, vomiting or wheezing. She has tried increased fluids for the symptoms. The treatment provided mild relief.        Depression continues to be well controlled.  Continues to see Psych for medications refills.      No CP, SOB, leg swelling, headaches, vision changes. Tolerating Metoprolol without issue     Review of Systems   HENT:  Positive for congestion, sinus pain, sneezing and sore throat. Negative for ear pain and rhinorrhea.    Respiratory:  Negative for cough and wheezing.    Cardiovascular:  Negative for chest pain.   Gastrointestinal:  Negative for abdominal pain, diarrhea, nausea and vomiting.   Genitourinary:  Negative for dysuria.   Musculoskeletal:  Negative for joint pain and neck pain.   Skin:  Negative for rash.   Neurological:  Negative for headaches.       Current Outpatient Medications on File Prior to Visit   Medication Sig Dispense Refill    VRAYLAR 4.5 MG CAPS capsule       prazosin (MINIPRESS) 5 MG capsule       prazosin

## 2024-01-12 DIAGNOSIS — R05.1 ACUTE COUGH: Primary | ICD-10-CM

## 2024-01-12 DIAGNOSIS — J06.9 VIRAL URI: ICD-10-CM

## 2024-01-12 DIAGNOSIS — J02.9 SORE THROAT: ICD-10-CM

## 2024-01-12 RX ORDER — GUAIFENESIN AND DEXTROMETHORPHAN HYDROBROMIDE 600; 30 MG/1; MG/1
20 TABLET, EXTENDED RELEASE ORAL 2 TIMES DAILY PRN
Qty: 28 TABLET | Refills: 0 | Status: CANCELLED | OUTPATIENT
Start: 2024-01-12

## 2024-01-12 RX ORDER — GUAIFENESIN/DEXTROMETHORPHAN 100-10MG/5
5 SYRUP ORAL 3 TIMES DAILY PRN
Qty: 120 ML | Refills: 1 | Status: SHIPPED | OUTPATIENT
Start: 2024-01-12 | End: 2024-01-22

## 2024-01-12 NOTE — TELEPHONE ENCOUNTER
----- Message from Monika Smith sent at 1/12/2024  8:52 AM EST -----  Subject: Medication Problem     Medication: Dextromethorphan-guaiFENesin (MUCINEX DM)  MG TB12  Dosage: 2 tablets every 12 hours  Ordering Provider:     Question/Problem: Patient said this medication is making her vomit. Can   she take something else? Can she take another prescription? Please advise   asap.      Pharmacy: [facility] - [facility] [address] [facility] -    [phone] -  [phone]    ---------------------------------------------------------------------------  --------------  CALL BACK INFO  [phone]; OK to leave message on voicemail  ---------------------------------------------------------------------------  --------------    SCRIPT ANSWERS  Relationship to Patient: Self

## 2024-01-12 NOTE — TELEPHONE ENCOUNTER
----- Message from Monika Smith sent at 1/12/2024  8:52 AM EST -----  Subject: Medication Problem     Medication: Dextromethorphan-guaiFENesin (MUCINEX DM)  MG TB12  Dosage: 2 tablets every 12 hours  Ordering Provider:     Question/Problem: Patient said this medication is making her vomit. Can   she take something else? Can she take another prescription? Please advise   asap.      Pharmacy: [facility] - [facility] [address] [facility] -    [phone] -  [phone]    ---------------------------------------------------------------------------  --------------  CALL BACK INFO  [phone]; OK to leave message on voicemail  ---------------------------------------------------------------------------  --------------    SCRIPT ANSWERS  Relationship to Patient: Self

## 2024-01-12 NOTE — TELEPHONE ENCOUNTER
Patient states she is coughing so much from all the drainage that is why she vomits so she needs a liquid cough med to calm the cough down

## 2024-02-01 ENCOUNTER — OFFICE VISIT (OUTPATIENT)
Dept: FAMILY MEDICINE CLINIC | Age: 44
End: 2024-02-01
Payer: COMMERCIAL

## 2024-02-01 VITALS
OXYGEN SATURATION: 97 % | BODY MASS INDEX: 49.09 KG/M2 | WEIGHT: 286 LBS | SYSTOLIC BLOOD PRESSURE: 122 MMHG | TEMPERATURE: 98.6 F | HEART RATE: 74 BPM | DIASTOLIC BLOOD PRESSURE: 72 MMHG

## 2024-02-01 DIAGNOSIS — I10 PRIMARY HYPERTENSION: ICD-10-CM

## 2024-02-01 DIAGNOSIS — K21.00 GASTROESOPHAGEAL REFLUX DISEASE WITH ESOPHAGITIS WITHOUT HEMORRHAGE: ICD-10-CM

## 2024-02-01 DIAGNOSIS — R11.2 NON-INTRACTABLE VOMITING WITH NAUSEA: ICD-10-CM

## 2024-02-01 DIAGNOSIS — E55.9 VITAMIN D DEFICIENCY: ICD-10-CM

## 2024-02-01 DIAGNOSIS — R11.2 NAUSEA AND VOMITING, UNSPECIFIED VOMITING TYPE: Primary | ICD-10-CM

## 2024-02-01 LAB
25(OH)D3 SERPL-MCNC: 18.1 NG/ML
ALBUMIN SERPL-MCNC: 4.4 G/DL (ref 3.4–5)
ALBUMIN/GLOB SERPL: 1.5 {RATIO} (ref 1.1–2.2)
ALP SERPL-CCNC: 81 U/L (ref 40–129)
ALT SERPL-CCNC: 17 U/L (ref 10–40)
ANION GAP SERPL CALCULATED.3IONS-SCNC: 13 MMOL/L (ref 3–16)
AST SERPL-CCNC: 16 U/L (ref 15–37)
BASOPHILS # BLD: 0.1 K/UL (ref 0–0.2)
BASOPHILS NFR BLD: 1 %
BILIRUB SERPL-MCNC: 0.3 MG/DL (ref 0–1)
BUN SERPL-MCNC: 12 MG/DL (ref 7–20)
CALCIUM SERPL-MCNC: 9.2 MG/DL (ref 8.3–10.6)
CHLORIDE SERPL-SCNC: 99 MMOL/L (ref 99–110)
CHOLEST SERPL-MCNC: 229 MG/DL (ref 0–199)
CO2 SERPL-SCNC: 26 MMOL/L (ref 21–32)
CREAT SERPL-MCNC: 0.8 MG/DL (ref 0.6–1.1)
DEPRECATED RDW RBC AUTO: 15.5 % (ref 12.4–15.4)
EOSINOPHIL # BLD: 0.2 K/UL (ref 0–0.6)
EOSINOPHIL NFR BLD: 2.4 %
GFR SERPLBLD CREATININE-BSD FMLA CKD-EPI: >60 ML/MIN/{1.73_M2}
GLUCOSE SERPL-MCNC: 100 MG/DL (ref 70–99)
HCT VFR BLD AUTO: 44.1 % (ref 36–48)
HDLC SERPL-MCNC: 60 MG/DL (ref 40–60)
HGB BLD-MCNC: 14.5 G/DL (ref 12–16)
LDLC SERPL CALC-MCNC: 144 MG/DL
LYMPHOCYTES # BLD: 1.9 K/UL (ref 1–5.1)
LYMPHOCYTES NFR BLD: 19.4 %
MCH RBC QN AUTO: 26.2 PG (ref 26–34)
MCHC RBC AUTO-ENTMCNC: 32.8 G/DL (ref 31–36)
MCV RBC AUTO: 79.7 FL (ref 80–100)
MONOCYTES # BLD: 0.6 K/UL (ref 0–1.3)
MONOCYTES NFR BLD: 5.8 %
NEUTROPHILS # BLD: 6.9 K/UL (ref 1.7–7.7)
NEUTROPHILS NFR BLD: 71.4 %
PLATELET # BLD AUTO: 343 K/UL (ref 135–450)
PMV BLD AUTO: 8 FL (ref 5–10.5)
POTASSIUM SERPL-SCNC: 4.1 MMOL/L (ref 3.5–5.1)
PROT SERPL-MCNC: 7.4 G/DL (ref 6.4–8.2)
RBC # BLD AUTO: 5.53 M/UL (ref 4–5.2)
SODIUM SERPL-SCNC: 138 MMOL/L (ref 136–145)
TRIGL SERPL-MCNC: 125 MG/DL (ref 0–150)
VLDLC SERPL CALC-MCNC: 25 MG/DL
WBC # BLD AUTO: 9.6 K/UL (ref 4–11)

## 2024-02-01 PROCEDURE — 99214 OFFICE O/P EST MOD 30 MIN: CPT | Performed by: NURSE PRACTITIONER

## 2024-02-01 PROCEDURE — 3078F DIAST BP <80 MM HG: CPT | Performed by: NURSE PRACTITIONER

## 2024-02-01 PROCEDURE — 3074F SYST BP LT 130 MM HG: CPT | Performed by: NURSE PRACTITIONER

## 2024-02-01 PROCEDURE — G8417 CALC BMI ABV UP PARAM F/U: HCPCS | Performed by: NURSE PRACTITIONER

## 2024-02-01 PROCEDURE — 36415 COLL VENOUS BLD VENIPUNCTURE: CPT | Performed by: NURSE PRACTITIONER

## 2024-02-01 PROCEDURE — G8482 FLU IMMUNIZE ORDER/ADMIN: HCPCS | Performed by: NURSE PRACTITIONER

## 2024-02-01 PROCEDURE — 1036F TOBACCO NON-USER: CPT | Performed by: NURSE PRACTITIONER

## 2024-02-01 PROCEDURE — G8427 DOCREV CUR MEDS BY ELIG CLIN: HCPCS | Performed by: NURSE PRACTITIONER

## 2024-02-01 RX ORDER — PANTOPRAZOLE SODIUM 40 MG/1
TABLET, DELAYED RELEASE ORAL
Qty: 28 TABLET | Refills: 11 | Status: SHIPPED | OUTPATIENT
Start: 2024-02-01

## 2024-02-01 ASSESSMENT — ENCOUNTER SYMPTOMS
BLOOD IN STOOL: 0
CHEST TIGHTNESS: 0
DIARRHEA: 0
COUGH: 0
SHORTNESS OF BREATH: 0
CONSTIPATION: 0
VOMITING: 1
NAUSEA: 1

## 2024-02-01 NOTE — PROGRESS NOTES
2/1/2024    Chief Complaint   Patient presents with    Dysphagia     States it feels like something gets stuck in her throat and then she gets to coughing and then she vomits     Blood Work     She is fasting today so she would like to get her FBW done        Blaire Hamm is a 44 y.o. female, presents today for:      ASSESSMENT/PLAN:    1. Nausea and vomiting, unspecified vomiting type  Concern for possible EoE or LR or esophageal strictures as cause for nausea/ vomiting and worsening GERD.   Referral placed to GI  Continue Pantoprazole for now  No imagine as symptoms are occurring in upper third of esophagus  - Clementine Wong MD, Gastroenterology, [facility]    2. Gastroesophageal reflux disease with esophagitis without hemorrhage  See above  - Clementine Wong MD, Gastroenterology, [facility]    3. Primary hypertension  Controlled today  Continue Metoprolol 100 ER  Annual labs ordered today  Encouraged diet/ exercise changes  - Comprehensive Metabolic Panel  - LIPID PANEL  - CBC with Auto Differential    4. Vitamin D deficiency  Updated labs ordered today  - Vitamin D 25 Hydroxy      No follow-ups on file.    Presenting today for continued dysphagia and recurrent vomiting x several weeks.  Reports she will have something \"catch\" in her throat and will then start vomiting anything including undigested food until she has nothing left in her stomach.  Her vomiting will occur   Something catches in throat and then she will start vomiting.  Nausea is occurring intermittently and is not related to meals or certain foods that she eats.  Notes symptoms worsened after viral URI 1/8.  However she notes symptoms are similar last summer when she started Protonix.  Starting the Protonix helped her symptoms at that time.  No prior imaging.  She has been attempting to change her diet and lose weight.  However due to her Bipolar disease she continues to gain weight.  Has looked into Bariatric surgery but is

## 2024-02-01 NOTE — TELEPHONE ENCOUNTER
Refill Request     CONFIRM preferrred pharmacy with the patient.    If Mail Order Rx - Pend for 90 day refill.      Last Seen: Last Seen Department: 2/1/2024  Last Seen by PCP: 2/1/2024    Last Written: 3-3-2023    If no future appointment scheduled, route STAFF MESSAGE with patient name to the  Pool for scheduling.      Next Appointment:   Future Appointments   Date Time Provider Department Center   2/19/2024  3:20 PM Mallory Sapp APRN - [facility]       Message sent to  to schedule appt with patient?  N/A      Requested Prescriptions     Pending Prescriptions Disp Refills    pantoprazole (PROTONIX) 40 MG tablet [Pharmacy Med Name: PANTOPRAZOLE SODIUM 40MG TABLET DR] 28 tablet 11     Sig: TAKE ONE (1) TABLET BY MOUTH EVERY MORNING (BEFORE BREAKFAST)

## 2024-02-02 RX ORDER — ERGOCALCIFEROL 1.25 MG/1
50000 CAPSULE ORAL WEEKLY
Qty: 12 CAPSULE | Refills: 1 | Status: SHIPPED | OUTPATIENT
Start: 2024-02-02

## 2024-02-02 NOTE — RESULT ENCOUNTER NOTE
Looks like you may have a folate or a b12 deficiency.  Are you taking a multivitamin?  May help.      Normal kidney/ liver function.     Cholesterol is more elevated than before.  Please continue to watch your diet closely and continue to work on weight loss.      Vitamin D continues to be low.  I sent you a 6 month supply of a weekly supply of Vitamin D to see if we can get that higher.

## 2024-02-23 DIAGNOSIS — H60.502 ACUTE OTITIS EXTERNA OF LEFT EAR, UNSPECIFIED TYPE: ICD-10-CM

## 2024-02-23 RX ORDER — MECLIZINE HCL 12.5 MG/1
TABLET ORAL
Qty: 15 TABLET | Refills: 0 | Status: SHIPPED | OUTPATIENT
Start: 2024-02-23

## 2024-02-23 NOTE — TELEPHONE ENCOUNTER
Future appt scheduled 0 appt scheduled                  Last appt 02/01/2024      Last Written 10/20/2023    meclizine (ANTIVERT) 12.5 MG tablet  #15   0 RF

## 2024-03-05 ENCOUNTER — OFFICE VISIT (OUTPATIENT)
Dept: ORTHOPEDIC SURGERY | Age: 44
End: 2024-03-05
Payer: COMMERCIAL

## 2024-03-05 VITALS — HEIGHT: 64 IN | WEIGHT: 286 LBS | BODY MASS INDEX: 48.83 KG/M2

## 2024-03-05 DIAGNOSIS — M17.11 PRIMARY LOCALIZED OSTEOARTHRITIS OF RIGHT KNEE: Primary | ICD-10-CM

## 2024-03-05 DIAGNOSIS — Z96.652 S/P TOTAL KNEE REPLACEMENT, LEFT: ICD-10-CM

## 2024-03-05 PROCEDURE — G8482 FLU IMMUNIZE ORDER/ADMIN: HCPCS | Performed by: STUDENT IN AN ORGANIZED HEALTH CARE EDUCATION/TRAINING PROGRAM

## 2024-03-05 PROCEDURE — G8427 DOCREV CUR MEDS BY ELIG CLIN: HCPCS | Performed by: STUDENT IN AN ORGANIZED HEALTH CARE EDUCATION/TRAINING PROGRAM

## 2024-03-05 PROCEDURE — 1036F TOBACCO NON-USER: CPT | Performed by: STUDENT IN AN ORGANIZED HEALTH CARE EDUCATION/TRAINING PROGRAM

## 2024-03-05 PROCEDURE — 99213 OFFICE O/P EST LOW 20 MIN: CPT | Performed by: STUDENT IN AN ORGANIZED HEALTH CARE EDUCATION/TRAINING PROGRAM

## 2024-03-05 PROCEDURE — G8417 CALC BMI ABV UP PARAM F/U: HCPCS | Performed by: STUDENT IN AN ORGANIZED HEALTH CARE EDUCATION/TRAINING PROGRAM

## 2024-03-05 NOTE — PROGRESS NOTES
History: 44-year-old female with history of CP presents for follow-up for bilateral knees.  She is known to me for left total knee arthroplasty for severe patellofemoral arthritis which was on March 15, 2023.  She also has right knee patellofemoral arthritis.  She has issues with hamstring spasticity and abnormal gait related to the CP.  Previously referred her to PMNR and  and she is been working with Dr. Romero and very happy with this.  She is set up for Botox injections in the near future and scheduled to start physical therapy in the near future as well.  She is on any muscle relaxer.  She is having continued persistent issues with her gait and frustrated about this.  She feels some subjective instability to the right knee and is bothered by the incomplete extension of the left knee following knee replacement.    Exam: Exam shows well-healed incision of the left knee.  She is 3 to 5 degrees short of full extension on the left knee.  No ligamentous instability.  Flexion to 120 without issue.  Right knee has full range of motion and no ligamentous instability appreciated.  There is no neurovascular compromise in either knee.    Examination of her gait is significant for weakness of hip flexion with walking and external rotation of the right foot more than left when walking.    Imaging: 3 views of the left knee 4 views of the right knee ordered obtained interpreted reviewed show bone-on-bone patellofemoral arthritis to the right knee medially with preserved joint spaces otherwise.  Left knee replacement in stable position.    Assessment: 44-year-old female status post left TKA a year ago and with right knee patellofemoral arthritis and gait difficulties secondary to cerebral palsy.    Plan: Discussed importance of PT as her main treatment modality at this time. Appreciate PMR input. F/u PRN.

## 2024-03-06 RX ORDER — LIDOCAINE 4 G/G
1 PATCH TOPICAL DAILY
Qty: 30 PATCH | Refills: 0 | Status: SHIPPED | OUTPATIENT
Start: 2024-03-06 | End: 2024-04-05

## 2024-03-06 NOTE — TELEPHONE ENCOUNTER
Prescription Refill     Medication Name:  LIDOCAINE PATCHES  Pharmacy: [facility] - [place] - [address] - P [phone] - F [phone]   Patient Contact Number:  [phone]     Pt WAS IN FOR OV AND LIDOCAINE PATCHES WERE SUPPOSED TO BE SENT IN FOR HER? NOTHING AT PHARMACY. PLEASE SEND.

## 2024-03-11 ENCOUNTER — TELEPHONE (OUTPATIENT)
Dept: FAMILY MEDICINE CLINIC | Age: 44
End: 2024-03-11

## 2024-03-11 DIAGNOSIS — R25.1 TREMOR OF LEFT HAND: Primary | ICD-10-CM

## 2024-03-11 NOTE — TELEPHONE ENCOUNTER
Patient requests you call her regarding her medication Mysoline, other doctor is trying to her off of it.

## 2024-03-12 RX ORDER — PRIMIDONE 50 MG/1
50 TABLET ORAL 3 TIMES DAILY
Qty: 90 TABLET | Refills: 3 | Status: SHIPPED | OUTPATIENT
Start: 2024-03-12

## 2024-03-12 NOTE — TELEPHONE ENCOUNTER
Patient called back and said she is no longer taking the Dantrium which was being prescribed by another doctor.  She is now wanting to try the Mysoline but states she no longer has the medication and is requesting to have new script sent to [facility]

## 2024-03-12 NOTE — TELEPHONE ENCOUNTER
I resent the Mysoline to [place].  Taking one tablet daily for 3 days then increase to BID for 3 days then increase to TID. If her tremors get better with twice a day dosing then she doesn't have to take more than twice a day.

## 2024-03-26 DIAGNOSIS — I10 ESSENTIAL HYPERTENSION: ICD-10-CM

## 2024-03-26 RX ORDER — METOPROLOL SUCCINATE 100 MG/1
TABLET, EXTENDED RELEASE ORAL
Qty: 28 TABLET | Refills: 5 | Status: SHIPPED | OUTPATIENT
Start: 2024-03-26

## 2024-04-11 ENCOUNTER — OFFICE VISIT (OUTPATIENT)
Dept: FAMILY MEDICINE CLINIC | Age: 44
End: 2024-04-11
Payer: COMMERCIAL

## 2024-04-11 VITALS
OXYGEN SATURATION: 98 % | WEIGHT: 284 LBS | DIASTOLIC BLOOD PRESSURE: 72 MMHG | HEART RATE: 84 BPM | TEMPERATURE: 97.6 F | BODY MASS INDEX: 48.75 KG/M2 | SYSTOLIC BLOOD PRESSURE: 122 MMHG

## 2024-04-11 DIAGNOSIS — R22.43 LOCALIZED SWELLING OF BOTH LOWER LEGS: ICD-10-CM

## 2024-04-11 DIAGNOSIS — R25.1 TREMOR OF LEFT HAND: Primary | ICD-10-CM

## 2024-04-11 PROCEDURE — 3078F DIAST BP <80 MM HG: CPT | Performed by: NURSE PRACTITIONER

## 2024-04-11 PROCEDURE — G8427 DOCREV CUR MEDS BY ELIG CLIN: HCPCS | Performed by: NURSE PRACTITIONER

## 2024-04-11 PROCEDURE — 3074F SYST BP LT 130 MM HG: CPT | Performed by: NURSE PRACTITIONER

## 2024-04-11 PROCEDURE — 99213 OFFICE O/P EST LOW 20 MIN: CPT | Performed by: NURSE PRACTITIONER

## 2024-04-11 PROCEDURE — 1036F TOBACCO NON-USER: CPT | Performed by: NURSE PRACTITIONER

## 2024-04-11 PROCEDURE — G8417 CALC BMI ABV UP PARAM F/U: HCPCS | Performed by: NURSE PRACTITIONER

## 2024-04-11 RX ORDER — CLONAZEPAM 0.5 MG/1
TABLET ORAL
Qty: 10 TABLET | Refills: 0 | Status: SHIPPED | OUTPATIENT
Start: 2024-04-11 | End: 2024-04-27

## 2024-04-11 RX ORDER — PRAZOSIN HYDROCHLORIDE 2 MG/1
4 CAPSULE ORAL NIGHTLY
COMMUNITY
Start: 2024-01-10

## 2024-04-11 SDOH — ECONOMIC STABILITY: INCOME INSECURITY: HOW HARD IS IT FOR YOU TO PAY FOR THE VERY BASICS LIKE FOOD, HOUSING, MEDICAL CARE, AND HEATING?: NOT HARD AT ALL

## 2024-04-11 SDOH — ECONOMIC STABILITY: FOOD INSECURITY: WITHIN THE PAST 12 MONTHS, YOU WORRIED THAT YOUR FOOD WOULD RUN OUT BEFORE YOU GOT MONEY TO BUY MORE.: NEVER TRUE

## 2024-04-11 SDOH — ECONOMIC STABILITY: FOOD INSECURITY: WITHIN THE PAST 12 MONTHS, THE FOOD YOU BOUGHT JUST DIDN'T LAST AND YOU DIDN'T HAVE MONEY TO GET MORE.: NEVER TRUE

## 2024-04-11 ASSESSMENT — ENCOUNTER SYMPTOMS
SHORTNESS OF BREATH: 0
COUGH: 0
BLOOD IN STOOL: 0
VOMITING: 0
CHEST TIGHTNESS: 0
NAUSEA: 0
DIARRHEA: 0
CONSTIPATION: 0

## 2024-04-11 ASSESSMENT — PATIENT HEALTH QUESTIONNAIRE - PHQ9
9. THOUGHTS THAT YOU WOULD BE BETTER OFF DEAD, OR OF HURTING YOURSELF: NOT AT ALL
2. FEELING DOWN, DEPRESSED OR HOPELESS: NOT AT ALL
5. POOR APPETITE OR OVEREATING: NOT AT ALL
7. TROUBLE CONCENTRATING ON THINGS, SUCH AS READING THE NEWSPAPER OR WATCHING TELEVISION: NOT AT ALL
8. MOVING OR SPEAKING SO SLOWLY THAT OTHER PEOPLE COULD HAVE NOTICED. OR THE OPPOSITE, BEING SO FIGETY OR RESTLESS THAT YOU HAVE BEEN MOVING AROUND A LOT MORE THAN USUAL: NOT AT ALL
1. LITTLE INTEREST OR PLEASURE IN DOING THINGS: NOT AT ALL
SUM OF ALL RESPONSES TO PHQ QUESTIONS 1-9: 0
SUM OF ALL RESPONSES TO PHQ QUESTIONS 1-9: 0
6. FEELING BAD ABOUT YOURSELF - OR THAT YOU ARE A FAILURE OR HAVE LET YOURSELF OR YOUR FAMILY DOWN: NOT AT ALL
4. FEELING TIRED OR HAVING LITTLE ENERGY: NOT AT ALL
SUM OF ALL RESPONSES TO PHQ QUESTIONS 1-9: 0
10. IF YOU CHECKED OFF ANY PROBLEMS, HOW DIFFICULT HAVE THESE PROBLEMS MADE IT FOR YOU TO DO YOUR WORK, TAKE CARE OF THINGS AT HOME, OR GET ALONG WITH OTHER PEOPLE: NOT DIFFICULT AT ALL
3. TROUBLE FALLING OR STAYING ASLEEP: NOT AT ALL
SUM OF ALL RESPONSES TO PHQ QUESTIONS 1-9: 0
SUM OF ALL RESPONSES TO PHQ9 QUESTIONS 1 & 2: 0

## 2024-04-11 NOTE — PATIENT INSTRUCTIONS
Discuss if you can have Topamax with your Psychiatry and Physical Medication provider  Discuss other treatment options for your tremor with Psychiatry and Physical Medicine  Prior meds: Propranolol (no help, felt weird on it 12/2023), Gabapentin (2014, leg edema), Primidone (3/2024, no help)  3. Let them know you started Clonazepam as needed for the tremor.

## 2024-04-11 NOTE — PROGRESS NOTES
4/11/2024    Chief Complaint   Patient presents with    Tremors     1 mo fu       Blaire Hamm is a 44 y.o. female, presents today for:      ASSESSMENT/PLAN:  1. Tremor of left hand  Continue to have suboptimal control of hand tremor.    --Encouraged to discuss medication/ alternative therapy options with PMR. Unable to attempt Topamax due to potential medication interaction with Lamictal (arrythmia)   --Prior meds: Propranolol (no help, felt weird on it 12/2023), Gabapentin (2014, leg edema), Primidone (3/2024, no help)  Trial on Clonazepam.  Discussed risks, benefits, side effects.  Discussed to take only as needed on days for school work.  Encouraged to discuss this med with Psychiatry (who prescribes Bipolar medication) and PMR.  If this is effective will need to coordinate if she can continue long-term and who will prescribe.    - clonazePAM (KLONOPIN) 0.5 MG tablet; Take 0.5- 1 tablet as needed for tremor daily  Dispense: 10 tablet; Refill: 0    2. Localized swelling of both lower legs  Discussed diet and exercise changes  Encouraged compression socks  Consider Cardiac w/u due to medication.     Return in about 4 weeks (around 5/9/2024) for Clonazepam follow .    Presenting today for continued hand tremor.    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 Concerned this will affect her ability to complete her work.  Currently working with PMR on her knee pain.  Working with her graduate program for accomodation.      The tremor affects the left arm and left hand. The onset of the tremor was gradual. Current severity of tremor is rated at minimal. The tremor occurs intermittently. The tremor lasts 30 minutes. Relieved by: less stress.     Associated symptoms include change in handwriting, change in posture and difficulty balancing. Associated symptoms do not include difficulty walking, drooling while sleeping, posture problems, rigidity, sleep disturbance, trouble swallowing or voice change.  Currently

## 2024-04-24 ENCOUNTER — OFFICE VISIT (OUTPATIENT)
Dept: FAMILY MEDICINE CLINIC | Age: 44
End: 2024-04-24
Payer: COMMERCIAL

## 2024-04-24 VITALS
DIASTOLIC BLOOD PRESSURE: 78 MMHG | TEMPERATURE: 97.5 F | SYSTOLIC BLOOD PRESSURE: 116 MMHG | OXYGEN SATURATION: 97 % | HEART RATE: 98 BPM | BODY MASS INDEX: 49.44 KG/M2 | WEIGHT: 288 LBS

## 2024-04-24 DIAGNOSIS — J06.9 VIRAL URI: ICD-10-CM

## 2024-04-24 DIAGNOSIS — R05.1 ACUTE COUGH: ICD-10-CM

## 2024-04-24 DIAGNOSIS — R25.1 TREMOR OF LEFT HAND: Primary | ICD-10-CM

## 2024-04-24 LAB
INFLUENZA A ANTIGEN, POC: NEGATIVE
INFLUENZA B ANTIGEN, POC: NEGATIVE
LOT EXPIRE DATE: NORMAL
LOT KIT NUMBER: 1111
RSV RNA: NORMAL
SARS-COV-2, POC: NORMAL
VALID INTERNAL CONTROL: NORMAL
VENDOR AND KIT NAME POC: NORMAL

## 2024-04-24 PROCEDURE — 3078F DIAST BP <80 MM HG: CPT | Performed by: NURSE PRACTITIONER

## 2024-04-24 PROCEDURE — 1036F TOBACCO NON-USER: CPT | Performed by: NURSE PRACTITIONER

## 2024-04-24 PROCEDURE — G8427 DOCREV CUR MEDS BY ELIG CLIN: HCPCS | Performed by: NURSE PRACTITIONER

## 2024-04-24 PROCEDURE — 3074F SYST BP LT 130 MM HG: CPT | Performed by: NURSE PRACTITIONER

## 2024-04-24 PROCEDURE — 87428 SARSCOV & INF VIR A&B AG IA: CPT | Performed by: NURSE PRACTITIONER

## 2024-04-24 PROCEDURE — G8417 CALC BMI ABV UP PARAM F/U: HCPCS | Performed by: NURSE PRACTITIONER

## 2024-04-24 PROCEDURE — 87634 RSV DNA/RNA AMP PROBE: CPT | Performed by: NURSE PRACTITIONER

## 2024-04-24 PROCEDURE — 99213 OFFICE O/P EST LOW 20 MIN: CPT | Performed by: NURSE PRACTITIONER

## 2024-04-24 RX ORDER — CLONAZEPAM 0.5 MG/1
TABLET ORAL
Qty: 30 TABLET | Refills: 2 | Status: SHIPPED | OUTPATIENT
Start: 2024-04-24 | End: 2024-05-10

## 2024-04-24 RX ORDER — BUSPIRONE HYDROCHLORIDE 10 MG/1
10 TABLET ORAL 3 TIMES DAILY
COMMUNITY
Start: 2024-04-03 | End: 2024-04-24 | Stop reason: DRUGHIGH

## 2024-04-24 RX ORDER — METHOCARBAMOL 750 MG/1
750 TABLET, FILM COATED ORAL EVERY 8 HOURS
COMMUNITY
Start: 2024-03-27

## 2024-04-24 NOTE — PROGRESS NOTES
last OV.  Noted significant improvement with medication in tremor.  Able to complete more fine motor tasks since starting.  Patient has discussed with Psychiatry and PMR who are on board with patient continuing.     Pt also notes she has URI symptoms with hoarse voice, PND, bilateral ear pain.  Going to grandfather's birthday this evening and wants to make sure she does not have COVID.  No fever.  Did not take home COVID test.       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 Concerned this will affect her ability to complete her work.  Currently working with PMR on her knee pain.  Working with her graduate program for accomodation.      The tremor affects the left arm and left hand. The onset of the tremor was gradual. Current severity of tremor is rated at minimal. The tremor occurs intermittently. The tremor lasts 30 minutes. Relieved by: less stress.     Associated symptoms include change in handwriting, change in posture and difficulty balancing. Associated symptoms do not include difficulty walking, drooling while sleeping, posture problems, rigidity, sleep disturbance, trouble swallowing or voice change.  Currently following with Psychiatry due to Bipolar disease.  Continues to take Vraylar, Lamictal, Bupsirone, Lexapro.  Overall feels symptoms are controlled.  .  Has now stopped Abilify and started Vraylar without improvement.    Tremor worse when nervous.  Sometimes occurring on her whole body when especially nervous.       Prior meds: Propranolol (12/2023no help, Psychiatry recommending switching from Propranolol to Metoprolol), Gabapentin (2014, leg edema), Primidone (3/2024, no help)       Review of Systems   Constitutional: Negative.    Respiratory:  Negative for cough, chest tightness and shortness of breath.    Cardiovascular: Negative.    Gastrointestinal:  Negative for blood in stool, constipation, diarrhea, nausea and vomiting.   Skin: Negative.    Neurological:  Positive for tremors.

## 2024-04-26 RX ORDER — ERGOCALCIFEROL 1.25 MG/1
CAPSULE ORAL
Qty: 12 CAPSULE | Refills: 0 | Status: SHIPPED | OUTPATIENT
Start: 2024-04-26

## 2024-05-09 ENCOUNTER — HOSPITAL ENCOUNTER (OUTPATIENT)
Dept: PHYSICAL THERAPY | Age: 44
Setting detail: THERAPIES SERIES
Discharge: HOME | End: 2024-05-09
Payer: COMMERCIAL

## 2024-05-09 DIAGNOSIS — R26.0 ATAXIC GAIT: Primary | ICD-10-CM

## 2024-05-09 DIAGNOSIS — M62.89 MUSCLE TIGHTNESS: ICD-10-CM

## 2024-05-09 PROCEDURE — 97161 PT EVAL LOW COMPLEX 20 MIN: CPT

## 2024-05-09 PROCEDURE — 97110 THERAPEUTIC EXERCISES: CPT

## 2024-05-09 PROCEDURE — 97140 MANUAL THERAPY 1/> REGIONS: CPT

## 2024-05-09 NOTE — PLAN OF CARE
[facility]- Outpatient Rehabilitation and Therapy [address], [place] [address] office: [phone] fax: [phone]     Physical Therapy Initial Evaluation Certification      Dear Shari Romero,*,    We had the pleasure of evaluating the following patient for physical therapy services at [facility].  A summary of our findings can be found in the initial assessment below.  This includes our plan of care.  If you have any questions or concerns regarding these findings, please do not hesitate to contact me at the office phone number listed above.  Thank you for the referral.     Physician Signature:_______________________________Date:__________________  By signing above (or electronic signature), therapist’s plan is approved by physician       Physical Therapy: TREATMENT/PROGRESS NOTE   Patient: Blaire Hamm (44 y.o. female)   Examination Date: 2024   :  1980 MRN: [id number]   Visit #: 1   Insurance Allowable Auth Needed   Auth after evaluation required  [x]Yes    []No    Insurance: Payor: [employer] / Plan: [employer] [place] MEDICAID / Product Type: *No Product type* /   Insurance ID: [id number] - (Medicaid Managed)  Secondary Insurance (if applicable):    Treatment Diagnosis:     ICD-10-CM    1. Ataxic gait  R26.0       2. Muscle tightness  M62.89          Medical Diagnosis:    ICD-10-CM    1. Spasticity  R25.2       2. Cerebral palsy, unspecified type (HCC)  G80.9          Referring Physician: Shari Romero,*  PCP: Mallory Sapp APRN - CNP     Plan of care signed (Y/N):     Date of Patient follow up with Physician:      Progress Report/POC: EVAL today  POC update due: (10 visits /OR AUTH LIMITS, whichever is less)  2024                                             Precautions/ Contra-indications:           Latex allergy:  NO  Pacemaker:    NO  Contraindications for Manipulation: None  Date of Botox Injection:

## 2024-05-16 ENCOUNTER — HOSPITAL ENCOUNTER (OUTPATIENT)
Dept: PHYSICAL THERAPY | Age: 44
Setting detail: THERAPIES SERIES
Discharge: HOME | End: 2024-05-16
Payer: COMMERCIAL

## 2024-05-16 PROCEDURE — 97140 MANUAL THERAPY 1/> REGIONS: CPT

## 2024-05-16 PROCEDURE — 97110 THERAPEUTIC EXERCISES: CPT

## 2024-05-16 NOTE — FLOWSHEET NOTE
strength training, ROM, and functional mobility  Therapeutic Activities (06396) including: functional mobility training and education.  Neuromuscular Re-education (39544) activation and proprioception, including postural re-education.    Gait Training (86810) for normalization of ambulation patterns and AD training.   Manual Therapy (07475) as indicated to include: Passive Range of Motion, Soft Tissue Mobilization, and Myofascial Release  Modalities as needed that may include: Cryotherapy, Electrical Stimulation, Thermal Agents, and Vasoneumatic Compression  Patient education on joint protection, postural re-education, activity modification, and progression of HEP    Plan: Cont POC- Continue emphasis/focus on exercise progression, increasing ROM, improving soft tissue extensibility, and allowing for proper ROM. Next visit plan to add new exercises     Electronically Signed by Blaire Mcdermott PT  Date: 05/16/2024     Note: Portions of this note have been templated and/or copied from initial evaluation, reassessments and prior notes for documentation efficiency.    Note: If patient does not return for scheduled/recommended follow up visits, this note will serve as a discharge from care along with the most recent update on progress.    Ortho Evaluation

## 2024-05-21 ENCOUNTER — HOSPITAL ENCOUNTER (OUTPATIENT)
Dept: PHYSICAL THERAPY | Age: 44
Setting detail: THERAPIES SERIES
End: 2024-05-21
Payer: COMMERCIAL

## 2024-05-23 ENCOUNTER — HOSPITAL ENCOUNTER (OUTPATIENT)
Dept: PHYSICAL THERAPY | Age: 44
Setting detail: THERAPIES SERIES
Discharge: HOME | End: 2024-05-23
Payer: COMMERCIAL

## 2024-05-23 PROCEDURE — 97112 NEUROMUSCULAR REEDUCATION: CPT

## 2024-05-23 PROCEDURE — 97140 MANUAL THERAPY 1/> REGIONS: CPT

## 2024-05-23 PROCEDURE — 97110 THERAPEUTIC EXERCISES: CPT

## 2024-05-23 NOTE — FLOWSHEET NOTE
Needle 3+ muscle (54232)     Iontophoresis (39147)    VASO (37526)     Ultrasound (79150)    Group Therapy (93683)     Estim Attended (41257)    Canalith Repositioning (37495)     Other:    Other:    Total Timed Code Tx Minutes 60' 4       Total Treatment Minutes 60'        Charge Justification:  (18693) THERAPEUTIC EXERCISE - Provided verbal/tactile cueing for activities related to strengthening, flexibility, endurance, ROM performed to prevent loss of range of motion, maintain or improve muscular strength or increase flexibility, following either an injury or surgery.   (56975) MANUAL THERAPY -  Manual therapy techniques, 1 or more regions, each 15 minutes (Mobilization/manipulation, manual lymphatic drainage, manual traction) for the purpose of modulating pain, promoting relaxation,  increasing ROM, reducing/eliminating soft tissue swelling/inflammation/restriction, improving soft tissue extensibility and allowing for proper ROM for normal function with self care, mobility, lifting and ambulation    GOALS     Patient stated goal: To improve standing   [] Progressing: [] Met: [] Not Met: [] Adjusted    Therapist goals for Patient:   Short Term Goals: To be achieved in: 2 weeks  1. Independent in HEP and progression per patient tolerance, in order to prevent re-injury.   [] Progressing: [] Met: [] Not Met: [] Adjusted  2. Patient will have a decrease in pain to <2/10 to facilitate improvement in movement, function, and ADLs as indicated by Functional Deficits.  [] Progressing: [] Met: [] Not Met: [] Adjusted    Long Term Goals: To be achieved in: 12 weeks  1. Disability index score of 20% or less for the LEFS to assist with reaching prior level of function with activities such as improved functional walking.  [] Progressing: [] Met: [] Not Met: [] Adjusted  2. Patient will demonstrate increased AROM of L Knee = to R knee without pain to allow for proper joint functioning to enable patient to improve patient ability

## 2024-05-28 ENCOUNTER — HOSPITAL ENCOUNTER (OUTPATIENT)
Dept: PHYSICAL THERAPY | Age: 44
Setting detail: THERAPIES SERIES
Discharge: HOME | End: 2024-05-28
Payer: COMMERCIAL

## 2024-05-28 PROCEDURE — 97140 MANUAL THERAPY 1/> REGIONS: CPT

## 2024-05-28 PROCEDURE — 97112 NEUROMUSCULAR REEDUCATION: CPT

## 2024-05-28 PROCEDURE — 97110 THERAPEUTIC EXERCISES: CPT

## 2024-05-28 NOTE — FLOWSHEET NOTE
proper muscle recruitment and activation/motor control patterns, increasing ROM, improving soft tissue extensibility, and allowing for proper ROM. Next visit plan to progress reps, add new exercises, and continue current phase     Electronically Signed by Blaire Mcdermott PT  Date: 05/28/2024     Note: Portions of this note have been templated and/or copied from initial evaluation, reassessments and prior notes for documentation efficiency.    Note: If patient does not return for scheduled/recommended follow up visits, this note will serve as a discharge from care along with the most recent update on progress.    Ortho Evaluation

## 2024-05-30 ENCOUNTER — HOSPITAL ENCOUNTER (OUTPATIENT)
Dept: PHYSICAL THERAPY | Age: 44
Setting detail: THERAPIES SERIES
Discharge: HOME | End: 2024-05-30
Payer: COMMERCIAL

## 2024-05-30 PROCEDURE — 97112 NEUROMUSCULAR REEDUCATION: CPT | Performed by: PHYSICAL THERAPY ASSISTANT

## 2024-05-30 PROCEDURE — 97110 THERAPEUTIC EXERCISES: CPT | Performed by: PHYSICAL THERAPY ASSISTANT

## 2024-05-30 PROCEDURE — 97140 MANUAL THERAPY 1/> REGIONS: CPT | Performed by: PHYSICAL THERAPY ASSISTANT

## 2024-05-30 NOTE — FLOWSHEET NOTE
[facility]- Outpatient Rehabilitation and Therapy [address], [place] [address] office: [phone] fax: [phone]         Physical Therapy: TREATMENT/PROGRESS NOTE   Patient: Blaire Hamm (44 y.o. female)   Examination Date: 2024   :  1980 MRN: [id number]   Visit #: 5   Insurance Allowable Auth Needed   2024- 2024  24 visits  [x]Yes    []No    Insurance: Payor: [employer] / Plan: [employer] [place] MEDICAID / Product Type: *No Product type* /   Insurance ID: [id number] - (Medicaid Managed)  Secondary Insurance (if applicable):    Treatment Diagnosis:       ICD-10-CM      1. Ataxic gait  R26.0         2. Muscle tightness  M62.89       Medical Diagnosis:    ICD-10-CM    1. Spasticity  R25.2       2. Cerebral palsy, unspecified type (HCC)  G80.9          Referring Physician: Shari Romero,*  PCP: Mallory Sapp APRN - CNP     Plan of care signed (Y/N):     Date of Patient follow up with Physician:      Progress Report/POC: NO  POC update due: (10 visits /OR AUTH LIMITS, whichever is less)  2024                                             Precautions/ Contra-indications:           Latex allergy:  NO  Pacemaker:    NO  Contraindications for Manipulation: None  Date of Botox Injection: 2024  Other:    Red Flags:  None    C-SSRS Triggered by Intake questionnaire:   Patient answered 'NO' to both behavioral questions on intake.  No further screening warranted    Preferred Language for Healthcare:   [x] English       [] other:    SUBJECTIVE EXAMINATION     Patient stated complaint: Doing OK - no issue with HEP     Test used Initial score  2024   Pain Summary VAS 0/10 at rest  5/10 with activity 1-2/10   Functional questionnaire LEFS 23/80  71%    Other:                  OBJECTIVE EXAMINATION     24  ROM/Strength: (Blank cells denote NT)      Mvmt (norm) AROM L AROM R Notes PROM L PROM R Notes       HIP Flex (120)  45 °  60 °         Abd

## 2024-06-04 ENCOUNTER — HOSPITAL ENCOUNTER (OUTPATIENT)
Dept: PHYSICAL THERAPY | Age: 44
Setting detail: THERAPIES SERIES
Discharge: HOME | End: 2024-06-04
Payer: COMMERCIAL

## 2024-06-04 ENCOUNTER — TELEPHONE (OUTPATIENT)
Dept: ORTHOPEDIC SURGERY | Age: 44
End: 2024-06-04

## 2024-06-04 DIAGNOSIS — M17.11 PRIMARY LOCALIZED OSTEOARTHRITIS OF RIGHT KNEE: Primary | ICD-10-CM

## 2024-06-04 PROCEDURE — 97110 THERAPEUTIC EXERCISES: CPT

## 2024-06-04 PROCEDURE — 97112 NEUROMUSCULAR REEDUCATION: CPT

## 2024-06-04 PROCEDURE — 97140 MANUAL THERAPY 1/> REGIONS: CPT

## 2024-06-04 NOTE — TELEPHONE ENCOUNTER
General Question     Subject: CAN Pt GET GEL INJ?   Patient and /or Facility Request: Blaire Hamm   Contact Number: [phone]      Pt ASKING IF SHE CAN GET THE GEL INJECTION IN HER R KNEE?     PLEASE CALL TO ADVISE.

## 2024-06-05 NOTE — TELEPHONE ENCOUNTER
Forwarded to precert for Durolane inj rt knee.    Left message for patient to notify of status.     Will call pt when precert notifies the office.

## 2024-06-06 ENCOUNTER — HOSPITAL ENCOUNTER (OUTPATIENT)
Dept: PHYSICAL THERAPY | Age: 44
Setting detail: THERAPIES SERIES
End: 2024-06-06
Payer: COMMERCIAL

## 2024-06-11 ENCOUNTER — HOSPITAL ENCOUNTER (OUTPATIENT)
Dept: PHYSICAL THERAPY | Age: 44
Setting detail: THERAPIES SERIES
Discharge: HOME | End: 2024-06-11
Payer: COMMERCIAL

## 2024-06-11 PROCEDURE — 97112 NEUROMUSCULAR REEDUCATION: CPT

## 2024-06-11 PROCEDURE — 97140 MANUAL THERAPY 1/> REGIONS: CPT

## 2024-06-11 PROCEDURE — 97110 THERAPEUTIC EXERCISES: CPT

## 2024-06-11 NOTE — PLAN OF CARE
goals listed.    [] Progression is slowed due to complexities/Impairments listed.  [] Progression has been slowed due to co-morbidities.  [x] Plan just implemented, too soon (<30days) to assess goals progression   [] Goals require adjustment due to lack of progress  [] Patient is not progressing as expected and requires additional follow up with physician  [] Other:     TREATMENT PLAN         Plan: Cont POC- Continue emphasis/focus on exercise progression, improving proper muscle recruitment and activation/motor control patterns, increasing ROM, improving soft tissue extensibility, and allowing for proper ROM. Next visit plan to progress reps, add new exercises, and continue current phase     Electronically Signed by Blaire Mcdermott, PT  Date: 06/11/2024     Note: Portions of this note have been templated and/or copied from initial evaluation, reassessments and prior notes for documentation efficiency.    Note: If patient does not return for scheduled/recommended follow up visits, this note will serve as a discharge from care along with the most recent update on progress.    Ortho Evaluation

## 2024-06-13 ENCOUNTER — HOSPITAL ENCOUNTER (OUTPATIENT)
Dept: PHYSICAL THERAPY | Age: 44
Setting detail: THERAPIES SERIES
Discharge: HOME | End: 2024-06-13
Payer: COMMERCIAL

## 2024-06-13 PROCEDURE — 97110 THERAPEUTIC EXERCISES: CPT

## 2024-06-13 PROCEDURE — 97140 MANUAL THERAPY 1/> REGIONS: CPT

## 2024-06-13 PROCEDURE — 97112 NEUROMUSCULAR REEDUCATION: CPT

## 2024-06-13 NOTE — FLOWSHEET NOTE
[facility]- Outpatient Rehabilitation and Therapy [address], [place] [address] office: [phone] fax: [phone]       Physical Therapy: TREATMENT/PROGRESS NOTE   Patient: Blaire Hamm (44 y.o. female)   Examination Date: 2024   :  1980 MRN: [id number]   Visit #: 8   Insurance Allowable Auth Needed   2024- 2024  24 visits  [x]Yes    []No    Insurance: Payor: [employer] / Plan: [employer] [place] MEDICAID / Product Type: *No Product type* /   Insurance ID: [id number] - (Medicaid Managed)  Secondary Insurance (if applicable):    Treatment Diagnosis:       ICD-10-CM      1. Ataxic gait  R26.0         2. Muscle tightness  M62.89       Medical Diagnosis:    ICD-10-CM    1. Spasticity  R25.2       2. Cerebral palsy, unspecified type (HCC)  G80.9          Referring Physician: Shari Romero,*  PCP: Mallory Sapp APRN - CNP     Plan of care signed (Y/N):     Date of Patient follow up with Physician:      Progress Report/POC: NO  POC update due: (10 visits /OR AUTH LIMITS, whichever is less)  2024                                             Precautions/ Contra-indications:           Latex allergy:  NO  Pacemaker:    NO  Contraindications for Manipulation: None  Date of Botox Injection: 2024  Other:    Red Flags:  None    C-SSRS Triggered by Intake questionnaire:   Patient answered 'NO' to both behavioral questions on intake.  No further screening warranted    Preferred Language for Healthcare:   [x] English       [] other:    SUBJECTIVE EXAMINATION     Patient stated complaint: Pt reports having increased pain on the lateral aspect of R Hip. Patient states it began hurting the evening of the last visit.        Test used Initial score  2024   Pain Summary VAS 0/10 at rest  5/10 with activity 1-2/10   Functional questionnaire LEFS 23/80  71% 38/80  53%   Other:                  OBJECTIVE EXAMINATION     24  ROM/Strength: (Blank cells

## 2024-06-18 ENCOUNTER — HOSPITAL ENCOUNTER (OUTPATIENT)
Dept: PHYSICAL THERAPY | Age: 44
Setting detail: THERAPIES SERIES
Discharge: HOME | End: 2024-06-18
Payer: COMMERCIAL

## 2024-06-18 PROCEDURE — 97112 NEUROMUSCULAR REEDUCATION: CPT

## 2024-06-18 PROCEDURE — 97140 MANUAL THERAPY 1/> REGIONS: CPT

## 2024-06-18 PROCEDURE — 97110 THERAPEUTIC EXERCISES: CPT

## 2024-06-18 NOTE — FLOWSHEET NOTE
[facility]- Outpatient Rehabilitation and Therapy [address], [place] [address] office: [phone] fax: [phone]       Physical Therapy: TREATMENT/PROGRESS NOTE   Patient: Blaire Hamm (44 y.o. female)   Examination Date: 2024   :  1980 MRN: [id number]   Visit #: 9   Insurance Allowable Auth Needed   2024- 2024  24 visits  [x]Yes    []No    Insurance: Payor: [employer] / Plan: [employer] [place] MEDICAID / Product Type: *No Product type* /   Insurance ID: [id number] - (Medicaid Managed)  Secondary Insurance (if applicable):    Treatment Diagnosis:       ICD-10-CM      1. Ataxic gait  R26.0         2. Muscle tightness  M62.89       Medical Diagnosis:    ICD-10-CM    1. Spasticity  R25.2       2. Cerebral palsy, unspecified type (HCC)  G80.9          Referring Physician: Shari Romero,*  PCP: Mallory Sapp APRN - CNP     Plan of care signed (Y/N):     Date of Patient follow up with Physician:      Progress Report/POC: NO  POC update due: (10 visits /OR AUTH LIMITS, whichever is less)  2024                                             Precautions/ Contra-indications:           Latex allergy:  NO  Pacemaker:    NO  Contraindications for Manipulation: None  Date of Botox Injection: 2024  Other:    Red Flags:  None    C-SSRS Triggered by Intake questionnaire:   Patient answered 'NO' to both behavioral questions on intake.  No further screening warranted    Preferred Language for Healthcare:   [x] English       [] other:    SUBJECTIVE EXAMINATION     Patient stated complaint: Pt reports pain that she was having from last time has improved     Test used Initial score  2024   Pain Summary VAS 0/10 at rest  5/10 with activity 0/10   Functional questionnaire LEFS 23/80  71% 38/80  53%   Other:                  OBJECTIVE EXAMINATION     24  ROM/Strength: (Blank cells denote NT)      Mvmt (norm) AROM L AROM R Notes PROM L PROM R Notes

## 2024-06-20 ENCOUNTER — TELEPHONE (OUTPATIENT)
Dept: ORTHOPEDIC SURGERY | Age: 44
End: 2024-06-20

## 2024-06-20 ENCOUNTER — HOSPITAL ENCOUNTER (OUTPATIENT)
Dept: PHYSICAL THERAPY | Age: 44
Setting detail: THERAPIES SERIES
End: 2024-06-20
Payer: COMMERCIAL

## 2024-06-20 NOTE — TELEPHONE ENCOUNTER
BETI  (QTY 1)   RIGHT KNEE .  DENIED.     DOES NOT MEET CLINICAL POLICY.   PER FAX FROM [facility].     (DENIAL WITH RATIONALE & RIGHTS SCANNED UNDER MEDIA TAB)      NOTE: NEED NOTES SHOWING MEMBER BENEFITED FROM PREVIOUS SHOTS AND THAT PAIN HAS NOW RETURNED.     ### LEFT MESSAGE FOR PT NEEDS TO COME IN FOR NEW EVALUATION FOR ADDITIONAL DOCUMENTATION FROM MD AND THEN TRY AGAIN FOR PRECERT OF GEL INJ. INDRA

## 2024-06-25 ENCOUNTER — OFFICE VISIT (OUTPATIENT)
Dept: ORTHOPEDIC SURGERY | Age: 44
End: 2024-06-25
Payer: COMMERCIAL

## 2024-06-25 ENCOUNTER — HOSPITAL ENCOUNTER (OUTPATIENT)
Dept: PHYSICAL THERAPY | Age: 44
Setting detail: THERAPIES SERIES
Discharge: HOME | End: 2024-06-25
Payer: COMMERCIAL

## 2024-06-25 DIAGNOSIS — M17.11 PRIMARY LOCALIZED OSTEOARTHRITIS OF RIGHT KNEE: Primary | ICD-10-CM

## 2024-06-25 PROCEDURE — G8427 DOCREV CUR MEDS BY ELIG CLIN: HCPCS | Performed by: STUDENT IN AN ORGANIZED HEALTH CARE EDUCATION/TRAINING PROGRAM

## 2024-06-25 PROCEDURE — 20610 DRAIN/INJ JOINT/BURSA W/O US: CPT | Performed by: STUDENT IN AN ORGANIZED HEALTH CARE EDUCATION/TRAINING PROGRAM

## 2024-06-25 PROCEDURE — 1036F TOBACCO NON-USER: CPT | Performed by: STUDENT IN AN ORGANIZED HEALTH CARE EDUCATION/TRAINING PROGRAM

## 2024-06-25 PROCEDURE — G8417 CALC BMI ABV UP PARAM F/U: HCPCS | Performed by: STUDENT IN AN ORGANIZED HEALTH CARE EDUCATION/TRAINING PROGRAM

## 2024-06-25 PROCEDURE — 97110 THERAPEUTIC EXERCISES: CPT

## 2024-06-25 PROCEDURE — 99214 OFFICE O/P EST MOD 30 MIN: CPT | Performed by: STUDENT IN AN ORGANIZED HEALTH CARE EDUCATION/TRAINING PROGRAM

## 2024-06-25 PROCEDURE — 97112 NEUROMUSCULAR REEDUCATION: CPT

## 2024-06-25 PROCEDURE — 97140 MANUAL THERAPY 1/> REGIONS: CPT

## 2024-06-25 RX ORDER — TRIAMCINOLONE ACETONIDE 40 MG/ML
40 INJECTION, SUSPENSION INTRA-ARTICULAR; INTRAMUSCULAR ONCE
Status: COMPLETED | OUTPATIENT
Start: 2024-06-25 | End: 2024-06-25

## 2024-06-25 RX ORDER — LIDOCAINE HYDROCHLORIDE 10 MG/ML
4 INJECTION, SOLUTION INFILTRATION; PERINEURAL ONCE
Status: COMPLETED | OUTPATIENT
Start: 2024-06-25 | End: 2024-06-25

## 2024-06-25 RX ADMIN — TRIAMCINOLONE ACETONIDE 40 MG: 40 INJECTION, SUSPENSION INTRA-ARTICULAR; INTRAMUSCULAR at 11:29

## 2024-06-25 RX ADMIN — LIDOCAINE HYDROCHLORIDE 4 ML: 10 INJECTION, SOLUTION INFILTRATION; PERINEURAL at 11:29

## 2024-06-25 NOTE — FLOWSHEET NOTE
[facility]- Outpatient Rehabilitation and Therapy [address], [place] [address] office: [phone] fax: [phone]       Physical Therapy: TREATMENT/PROGRESS NOTE   Patient: Blaire Hamm (44 y.o. female)   Examination Date: 2024   :  1980 MRN: [id number]   Visit #: 10   Insurance Allowable Auth Needed   2024- 2024  24 visits  [x]Yes    []No    Insurance: Payor: [employer] / Plan: [employer] [place] MEDICAID / Product Type: *No Product type* /   Insurance ID: [id number] - (Medicaid Managed)  Secondary Insurance (if applicable):    Treatment Diagnosis:       ICD-10-CM      1. Ataxic gait  R26.0         2. Muscle tightness  M62.89       Medical Diagnosis:    ICD-10-CM    1. Spasticity  R25.2       2. Cerebral palsy, unspecified type (HCC)  G80.9          Referring Physician: Shari Romero,*  PCP: Mallory Sapp APRN - CNP     Plan of care signed (Y/N):     Date of Patient follow up with Physician:      Progress Report/POC: NO  POC update due: (10 visits /OR AUTH LIMITS, whichever is less)  2024                                             Precautions/ Contra-indications:           Latex allergy:  NO  Pacemaker:    NO  Contraindications for Manipulation: None  Date of Botox Injection: 2024  Other:    Red Flags:  None    C-SSRS Triggered by Intake questionnaire:   Patient answered 'NO' to both behavioral questions on intake.  No further screening warranted    Preferred Language for Healthcare:   [x] English       [] other:    SUBJECTIVE EXAMINATION     Patient stated complaint: Pt reports having had an injection in the right knee today.     Test used Initial score  2024   Pain Summary VAS 0/10 at rest  5/10 with activity R knee 5/10  Left Knee 0/10    Functional questionnaire LEFS 23/80  71% 38/80  53%   Other:                  OBJECTIVE EXAMINATION     24  ROM/Strength: (Blank cells denote NT)      Mvmt (norm) AROM L AROM R Notes

## 2024-06-25 NOTE — PROGRESS NOTES
Dr Gt Diaz      Date /Time 6/25/2024       11:32 AM EDT  Name Blaire Hamm             1980   Location  [facility] DR ORTHOPEDIC SURG  MRN [id number]                Chief Complaint   Patient presents with    Follow-up     Rt knee--  Csi /durolane 9/12/2023          History of Present Illness  Blaire Hamm is a 44 y.o. female who presents for follow-up of right knee pain.  Previously known to have right knee arthritis which we have been managing with Tylenol, ibuprofen, Robaxin and steroid injections and viscosupplementation.  She did get great benefit from viscosupplementation previously.  Her knee arthritis is also accompanied by gait difficulties secondary to cerebral palsy and hamstring spasticity.  [facility] and doing dedicated physical therapy.  She is recently purchased a bike to work on home exercise program as well.  Having a lot of increased pain with these exercises and interested in injections today.        Past History  Past Medical History:   Diagnosis Date    Bipolar disorder (HCC)     Cerebral palsy (HCC)     legs;mainly right    Depression     Hypertension     PONV (postoperative nausea and vomiting)      Past Surgical History:   Procedure Laterality Date    FOOT SURGERY      KNEE SURGERY Left 6/12/15    LEG SURGERY      TOTAL KNEE ARTHROPLASTY Left 3/15/2023    LEFT TOTAL KNEE ARTHROPLASTY           RONEL BIOMET NOTE: ADDUCTOR CANAL BLOCK, IPAC performed by Gt Diaz MD at [facility] OR     Social History     Tobacco Use    Smoking status: Never    Smokeless tobacco: Never   Substance Use Topics    Alcohol use: No     Alcohol/week: 0.0 standard drinks of alcohol      Current Outpatient Medications on File Prior to Visit   Medication Sig Dispense Refill    vitamin D (ERGOCALCIFEROL) 1.25 MG (51164 UT) CAPS capsule TAKE ONE (1) CAPSULE BY MOUTH ONCE A WEEK 12 capsule 0    methocarbamol (ROBAXIN) 750 MG tablet Take 1 tablet by mouth every

## 2024-07-02 ENCOUNTER — HOSPITAL ENCOUNTER (OUTPATIENT)
Dept: PHYSICAL THERAPY | Age: 44
Setting detail: THERAPIES SERIES
Discharge: HOME | End: 2024-07-02
Payer: COMMERCIAL

## 2024-07-02 PROCEDURE — 97110 THERAPEUTIC EXERCISES: CPT | Performed by: PHYSICAL THERAPY ASSISTANT

## 2024-07-02 PROCEDURE — 97140 MANUAL THERAPY 1/> REGIONS: CPT | Performed by: PHYSICAL THERAPY ASSISTANT

## 2024-07-02 PROCEDURE — 97112 NEUROMUSCULAR REEDUCATION: CPT | Performed by: PHYSICAL THERAPY ASSISTANT

## 2024-07-02 NOTE — FLOWSHEET NOTE
[facility]- Outpatient Rehabilitation and Therapy [address], [place] [address] office: [phone] fax: [phone]       Physical Therapy: TREATMENT/PROGRESS NOTE   Patient: Blaire Hamm (44 y.o. female)   Examination Date: 2024   :  1980 MRN: [id number]   Visit #: 11   Insurance Allowable Auth Needed   2024- 2024  24 visits  [x]Yes    []No    Insurance: Payor: [employer] / Plan: [employer] [place] MEDICAID / Product Type: *No Product type* /   Insurance ID: [id number] - (Medicaid Managed)  Secondary Insurance (if applicable):    Treatment Diagnosis:       ICD-10-CM      1. Ataxic gait  R26.0         2. Muscle tightness  M62.89       Medical Diagnosis:    ICD-10-CM    1. Spasticity  R25.2       2. Cerebral palsy, unspecified type (HCC)  G80.9          Referring Physician: Shari Romero,*  PCP: Mallory Sapp APRN - CNP     Plan of care signed (Y/N):     Date of Patient follow up with Physician:      Progress Report/POC: NO  POC update due: (10 visits /OR AUTH LIMITS, whichever is less)  2024                                             Precautions/ Contra-indications:           Latex allergy:  NO  Pacemaker:    NO  Contraindications for Manipulation: None  Date of Botox Injection: 2024  Other:    Red Flags:  None    C-SSRS Triggered by Intake questionnaire:   Patient answered 'NO' to both behavioral questions on intake.  No further screening warranted    Preferred Language for Healthcare:   [x] English       [] other:    SUBJECTIVE EXAMINATION     Patient stated complaint: Feels her left knee is not collapsing in as much.  She notes she is able to stand longer.      Test used Initial score  2024   Pain Summary VAS 0/10 at rest  5/10 with activity R knee 5/10  Left Knee 0/10    Functional questionnaire LEFS 23/80  71% 38/80  53%   Other:                  OBJECTIVE EXAMINATION     24  ROM/Strength: (Blank cells denote NT)

## 2024-07-08 ENCOUNTER — HOSPITAL ENCOUNTER (OUTPATIENT)
Dept: MAMMOGRAPHY | Age: 44
Discharge: HOME | End: 2024-07-13
Payer: COMMERCIAL

## 2024-07-08 VITALS — HEIGHT: 65 IN | BODY MASS INDEX: 46.65 KG/M2 | WEIGHT: 280 LBS

## 2024-07-08 DIAGNOSIS — Z12.31 VISIT FOR SCREENING MAMMOGRAM: ICD-10-CM

## 2024-07-08 PROCEDURE — 77067 SCR MAMMO BI INCL CAD: CPT

## 2024-07-09 ENCOUNTER — HOSPITAL ENCOUNTER (OUTPATIENT)
Dept: PHYSICAL THERAPY | Age: 44
Setting detail: THERAPIES SERIES
Discharge: HOME | End: 2024-07-09
Payer: COMMERCIAL

## 2024-07-09 PROCEDURE — 97112 NEUROMUSCULAR REEDUCATION: CPT | Performed by: PHYSICAL THERAPY ASSISTANT

## 2024-07-09 PROCEDURE — 97140 MANUAL THERAPY 1/> REGIONS: CPT | Performed by: PHYSICAL THERAPY ASSISTANT

## 2024-07-09 PROCEDURE — 97110 THERAPEUTIC EXERCISES: CPT | Performed by: PHYSICAL THERAPY ASSISTANT

## 2024-07-09 NOTE — PLAN OF CARE
Patient answered 'NO' to both behavioral questions on intake.  No further screening warranted    Preferred Language for Healthcare:   [x] English       [] other:    SUBJECTIVE EXAMINATION     Patient stated complaint: Overall feeling improved.  Right knee is still more sore than left.  Feels her left knee is not collapsing in as much.  She notes she is able to stand longer but is still limited to one minute of standing prior to having move or change positions.  Uses a rollator still for prolonged walking (ie [employer] trip) as she feels more stable that way.       Test used Initial score  5/9/24 07/09/2024   Pain Summary VAS 0/10 at rest  5/10 with activity R knee 5/10  Left Knee 2/10    Functional questionnaire LEFS 23/80  71% 37/80  54%   Other:                  OBJECTIVE EXAMINATION     5/9/24  ROM/Strength: (Blank cells denote NT)      Mvmt (norm) AROM L AROM R Notes PROM L PROM R Notes       HIP Flex (120)  45 °  60 °         Abd (45) 35 °  30 °         ER (50)          IR (45)          Ext (20)         KNEE Flex (140) 120 °  114 °         Ext (0) Lacking 6 °  0 °          ANKLE DF (20)          PF (50)          Inversion (30)          Eversion (20)               MMT L MMT R Notes       HIP  Flexion 3+/5 3+/5      Abduction \" \"      ER \" \"      IR \" \"      Extension      KNEE  Flexion 4-/5 4-/5      Extension \" \"      ANKLE  DF        PF        Inversion        Eversion          Exercises/Interventions     Therapeutic Ex (02808)   NMR re-education (44640) resistance Sets/time Reps Notes/Cues/Progressions   Bike   L3 5'           Standing Marches   [id number] Hip ABD  2x15 R, L  Standing HS Curl  2x15 R, L  Mini Squat   x20  FSU  NV LSU  NV        SLR  2 15 x     Glut Sets  10\" 15 x     Quad Sets  10\" 15 x     SAQ 2# 5\" x20 R,L    LAQ 2# 5\" 2x15 R, L      Supine marches  2#  20 x      HL Hip Add Squeeze  5\" 30 x     HL Hip Abd 3 Way Green  TBand  20 x ea           PPT  10\" 15 x     Bridging  green TBand 5\" 20 x

## 2024-07-11 ENCOUNTER — HOSPITAL ENCOUNTER (OUTPATIENT)
Dept: PHYSICAL THERAPY | Age: 44
Setting detail: THERAPIES SERIES
Discharge: HOME | End: 2024-07-11
Payer: COMMERCIAL

## 2024-07-11 PROCEDURE — 97112 NEUROMUSCULAR REEDUCATION: CPT | Performed by: PHYSICAL THERAPY ASSISTANT

## 2024-07-11 PROCEDURE — 97140 MANUAL THERAPY 1/> REGIONS: CPT | Performed by: PHYSICAL THERAPY ASSISTANT

## 2024-07-11 PROCEDURE — 97110 THERAPEUTIC EXERCISES: CPT | Performed by: PHYSICAL THERAPY ASSISTANT

## 2024-07-11 NOTE — FLOWSHEET NOTE
efficiency.    Note: If patient does not return for scheduled/recommended follow up visits, this note will serve as a discharge from care along with the most recent update on progress.    Ortho Evaluation

## 2024-07-16 ENCOUNTER — HOSPITAL ENCOUNTER (OUTPATIENT)
Dept: PHYSICAL THERAPY | Age: 44
Setting detail: THERAPIES SERIES
End: 2024-07-16
Payer: COMMERCIAL

## 2024-07-18 ENCOUNTER — HOSPITAL ENCOUNTER (OUTPATIENT)
Dept: PHYSICAL THERAPY | Age: 44
Setting detail: THERAPIES SERIES
Discharge: HOME | End: 2024-07-18
Payer: COMMERCIAL

## 2024-07-18 PROCEDURE — 97110 THERAPEUTIC EXERCISES: CPT | Performed by: PHYSICAL THERAPY ASSISTANT

## 2024-07-18 PROCEDURE — 97140 MANUAL THERAPY 1/> REGIONS: CPT | Performed by: PHYSICAL THERAPY ASSISTANT

## 2024-07-18 PROCEDURE — 97112 NEUROMUSCULAR REEDUCATION: CPT | Performed by: PHYSICAL THERAPY ASSISTANT

## 2024-07-18 NOTE — FLOWSHEET NOTE
[facility]- Outpatient Rehabilitation and Therapy [address], [place] [address] office: [phone] fax: [phone]         Physical Therapy: TREATMENT/PROGRESS NOTE   Patient: Blaire Hamm (44 y.o. female)   Examination Date: 2024   :  1980 MRN: [id number]   Visit #: 14   Insurance Allowable Auth Needed   2024- 2024  24 visits  [x]Yes    []No    Insurance: Payor: [employer] / Plan: [employer] [place] MEDICAID / Product Type: *No Product type* /   Insurance ID: [id number] - (Medicaid Managed)  Secondary Insurance (if applicable):    Treatment Diagnosis:       ICD-10-CM      1. Ataxic gait  R26.0         2. Muscle tightness  M62.89       Medical Diagnosis:    ICD-10-CM    1. Spasticity  R25.2       2. Cerebral palsy, unspecified type (HCC)  G80.9          Referring Physician: Shari Romero,*  PCP: Mallory Sapp APRN - CNP     Plan of care signed (Y/N):     Date of Patient follow up with Physician:      Progress Report/POC: NO  POC update due: (10 visits /OR AUTH LIMITS, whichever is less)  2024                                             Precautions/ Contra-indications:           Latex allergy:  NO  Pacemaker:    NO  Contraindications for Manipulation: None  Date of Botox Injection: 2024  Other:    Red Flags:  None    C-SSRS Triggered by Intake questionnaire:   Patient answered 'NO' to both behavioral questions on intake.  No further screening warranted    Preferred Language for Healthcare:   [x] English       [] other:    SUBJECTIVE EXAMINATION     Patient stated complaint: Sore from having botox injections yesterday in her hands and bilateral thighs.      Test used Initial score  2024   Pain Summary VAS 0/10 at rest  5/10 with activity R knee 5/10  Left Knee 2/10    Functional questionnaire LEFS 23/80  71% 37/80  54%   Other:                  OBJECTIVE EXAMINATION     24  ROM/Strength: (Blank cells denote NT)      Mvmt (norm)

## 2024-07-19 ENCOUNTER — TELEPHONE (OUTPATIENT)
Dept: FAMILY MEDICINE CLINIC | Age: 44
End: 2024-07-19

## 2024-07-19 NOTE — TELEPHONE ENCOUNTER
Patient states that they had spoken to Mallory about Dr. Romero at  taking over klonopin because she see's her more than she see's PCP and Dr. Romero has agreed to manage this medication.

## 2024-07-22 ENCOUNTER — HOSPITAL ENCOUNTER (OUTPATIENT)
Dept: PHYSICAL THERAPY | Age: 44
Setting detail: THERAPIES SERIES
End: 2024-07-22
Payer: COMMERCIAL

## 2024-07-24 ENCOUNTER — HOSPITAL ENCOUNTER (OUTPATIENT)
Dept: PHYSICAL THERAPY | Age: 44
Setting detail: THERAPIES SERIES
End: 2024-07-24
Payer: COMMERCIAL

## 2024-07-26 ENCOUNTER — OFFICE VISIT (OUTPATIENT)
Dept: FAMILY MEDICINE CLINIC | Age: 44
End: 2024-07-26
Payer: COMMERCIAL

## 2024-07-26 VITALS
BODY MASS INDEX: 47.59 KG/M2 | SYSTOLIC BLOOD PRESSURE: 127 MMHG | WEIGHT: 286 LBS | DIASTOLIC BLOOD PRESSURE: 91 MMHG | HEART RATE: 86 BPM | OXYGEN SATURATION: 95 %

## 2024-07-26 DIAGNOSIS — H69.93 ACUTE DYSFUNCTION OF BOTH EUSTACHIAN TUBES: Primary | ICD-10-CM

## 2024-07-26 DIAGNOSIS — F33.1 MDD (MAJOR DEPRESSIVE DISORDER), RECURRENT EPISODE, MODERATE (HCC): ICD-10-CM

## 2024-07-26 PROCEDURE — 99213 OFFICE O/P EST LOW 20 MIN: CPT | Performed by: NURSE PRACTITIONER

## 2024-07-26 PROCEDURE — G8427 DOCREV CUR MEDS BY ELIG CLIN: HCPCS | Performed by: NURSE PRACTITIONER

## 2024-07-26 PROCEDURE — 3080F DIAST BP >= 90 MM HG: CPT | Performed by: NURSE PRACTITIONER

## 2024-07-26 PROCEDURE — 1036F TOBACCO NON-USER: CPT | Performed by: NURSE PRACTITIONER

## 2024-07-26 PROCEDURE — G8417 CALC BMI ABV UP PARAM F/U: HCPCS | Performed by: NURSE PRACTITIONER

## 2024-07-26 PROCEDURE — 3074F SYST BP LT 130 MM HG: CPT | Performed by: NURSE PRACTITIONER

## 2024-07-26 RX ORDER — BUSPIRONE HYDROCHLORIDE 10 MG/1
10 TABLET ORAL SEE ADMIN INSTRUCTIONS
COMMUNITY

## 2024-07-26 RX ORDER — METHYLPREDNISOLONE 4 MG/1
TABLET ORAL
Qty: 1 KIT | Refills: 0 | Status: SHIPPED | OUTPATIENT
Start: 2024-07-26 | End: 2024-08-01

## 2024-07-26 RX ORDER — FLUTICASONE PROPIONATE 50 MCG
1 SPRAY, SUSPENSION (ML) NASAL DAILY
Qty: 32 G | Refills: 1 | Status: SHIPPED | OUTPATIENT
Start: 2024-07-26

## 2024-07-26 RX ORDER — MECLIZINE HCL 12.5 MG/1
12.5 TABLET ORAL 3 TIMES DAILY PRN
Qty: 15 TABLET | Refills: 0 | Status: SHIPPED | OUTPATIENT
Start: 2024-07-26 | End: 2024-08-05

## 2024-07-26 NOTE — PROGRESS NOTES
7/26/2024    Chief Complaint   Patient presents with    Dizziness     Started today        Blaire Hamm is a 44 y.o. female, presents today for:      ASSESSMENT/PLAN:    1. Acute dysfunction of both eustachian tubes  Likely acute eustacian tube dysfunction.  Trial Medrol, Flonase.  Start Meclizine for symptomatic dizziness.  Encouraged to call if no improvement  - methylPREDNISolone (MEDROL, BELLA,) 4 MG tablet; Take by mouth.  Dispense: 1 kit; Refill: 0  - fluticasone (FLONASE) 50 MCG/ACT nasal spray; 1 spray by Each Nostril route daily  Dispense: 32 g; Refill: 1  - meclizine (ANTIVERT) 12.5 MG tablet; Take 1 tablet by mouth 3 times daily as needed for Dizziness or Nausea  Dispense: 15 tablet; Refill: 0    2. MDD (major depressive disorder), recurrent episode, moderate (HCC)  Controlled today.  Continue Buspirone.  Continue care with Psych, appreciate assistance.    - busPIRone (BUSPAR) 10 MG tablet; Take 1 tablet by mouth See Admin Instructions 2 in the am 1 qhs      No follow-ups on file.    Presenting today due to concern for dizziness  Woke up at 6.  Room is spinning.  Attempted to eat an apple and put head between legs without improvement.  Blood pressure was 100.60.  Otherwise feeling well.  Ears feel slightly fell but no sore throat, cough, shortness of breath.  No chest pain, leg swelling.  No N/V.  No myalgias, parasthesias, unilateral arm weakness, dysarthrias.  Concerned may be something more serious.Notes she had viral URI several weeks ago.         Review of Systems   All other systems reviewed and are negative.      Current Outpatient Medications on File Prior to Visit   Medication Sig Dispense Refill    busPIRone (BUSPAR) 10 MG tablet Take 1 tablet by mouth See Admin Instructions 2 in the am 1 qhs      methocarbamol (ROBAXIN) 750 MG tablet Take 1 tablet by mouth every 8 (eight) hours      clonazePAM (KLONOPIN) 0.5 MG tablet Take 1 tablet as needed for tremor daily 30 tablet 2    metoprolol

## 2024-08-01 ENCOUNTER — HOSPITAL ENCOUNTER (OUTPATIENT)
Dept: PHYSICAL THERAPY | Age: 44
Setting detail: THERAPIES SERIES
Discharge: HOME | End: 2024-08-01
Payer: COMMERCIAL

## 2024-08-01 ENCOUNTER — TELEPHONE (OUTPATIENT)
Dept: FAMILY MEDICINE CLINIC | Age: 44
End: 2024-08-01

## 2024-08-01 PROCEDURE — 97112 NEUROMUSCULAR REEDUCATION: CPT | Performed by: PHYSICAL THERAPY ASSISTANT

## 2024-08-01 PROCEDURE — 97110 THERAPEUTIC EXERCISES: CPT | Performed by: PHYSICAL THERAPY ASSISTANT

## 2024-08-01 PROCEDURE — 97140 MANUAL THERAPY 1/> REGIONS: CPT | Performed by: PHYSICAL THERAPY ASSISTANT

## 2024-08-01 NOTE — TELEPHONE ENCOUNTER
----- Message from HENRY Sultana CNP sent at 8/1/2024  9:05 AM EDT -----  Call with update on dizziness please

## 2024-08-01 NOTE — FLOWSHEET NOTE
[facility]- Outpatient Rehabilitation and Therapy [address], [place] [address] office: [phone] fax: [phone]         Physical Therapy: TREATMENT/PROGRESS NOTE   Patient: Blaire Hamm (44 y.o. female)   Examination Date: 2024   :  1980 MRN: [id number]   Visit #: 15   Insurance Allowable Auth Needed   2024- 2024  24 visits  [x]Yes    []No    Insurance: Payor: [employer] / Plan: [employer] [place] MEDICAID / Product Type: *No Product type* /   Insurance ID: [id number] - (Medicaid Managed)  Secondary Insurance (if applicable):    Treatment Diagnosis:       ICD-10-CM      1. Ataxic gait  R26.0         2. Muscle tightness  M62.89       Medical Diagnosis:    ICD-10-CM    1. Spasticity  R25.2       2. Cerebral palsy, unspecified type (HCC)  G80.9          Referring Physician: Shari Romero,*  PCP: Mallory Sapp APRN - CNP     Plan of care signed (Y/N):     Date of Patient follow up with Physician:      Progress Report/POC: NO  POC update due: (10 visits /OR AUTH LIMITS, whichever is less)  2024                                             Precautions/ Contra-indications:           Latex allergy:  NO  Pacemaker:    NO  Contraindications for Manipulation: None  Date of Botox Injection: 2024  Other:    Red Flags:  None    C-SSRS Triggered by Intake questionnaire:   Patient answered 'NO' to both behavioral questions on intake.  No further screening warranted    Preferred Language for Healthcare:   [x] English       [] other:    SUBJECTIVE EXAMINATION     Patient stated complaint: Left knee is feeling good - right knee still has constant pain.  Last cortisone injection did not give her any relief - is planning to go ahead and contact MD about possible gel injections      Test used Initial score  2024   Pain Summary VAS 0/10 at rest  5/10 with activity R knee 5/10  Left Knee 2/10    Functional questionnaire LEFS 2380  71% 37/80  54%

## 2024-08-01 NOTE — TELEPHONE ENCOUNTER
Spoke with patient states that dizziness has resolved, she is doing well. No questions or concerns at this time.

## 2024-08-05 ENCOUNTER — OFFICE VISIT (OUTPATIENT)
Dept: FAMILY MEDICINE CLINIC | Age: 44
End: 2024-08-05
Payer: COMMERCIAL

## 2024-08-05 VITALS
DIASTOLIC BLOOD PRESSURE: 74 MMHG | BODY MASS INDEX: 47.59 KG/M2 | TEMPERATURE: 98.7 F | HEART RATE: 90 BPM | WEIGHT: 286 LBS | OXYGEN SATURATION: 95 % | SYSTOLIC BLOOD PRESSURE: 102 MMHG

## 2024-08-05 DIAGNOSIS — J02.9 SORE THROAT: Primary | ICD-10-CM

## 2024-08-05 LAB — S PYO AG THROAT QL: NORMAL

## 2024-08-05 PROCEDURE — 3078F DIAST BP <80 MM HG: CPT | Performed by: NURSE PRACTITIONER

## 2024-08-05 PROCEDURE — 99213 OFFICE O/P EST LOW 20 MIN: CPT | Performed by: NURSE PRACTITIONER

## 2024-08-05 PROCEDURE — 1036F TOBACCO NON-USER: CPT | Performed by: NURSE PRACTITIONER

## 2024-08-05 PROCEDURE — 3074F SYST BP LT 130 MM HG: CPT | Performed by: NURSE PRACTITIONER

## 2024-08-05 PROCEDURE — G8427 DOCREV CUR MEDS BY ELIG CLIN: HCPCS | Performed by: NURSE PRACTITIONER

## 2024-08-05 PROCEDURE — G8417 CALC BMI ABV UP PARAM F/U: HCPCS | Performed by: NURSE PRACTITIONER

## 2024-08-05 PROCEDURE — 87880 STREP A ASSAY W/OPTIC: CPT | Performed by: NURSE PRACTITIONER

## 2024-08-05 ASSESSMENT — PATIENT HEALTH QUESTIONNAIRE - PHQ9
SUM OF ALL RESPONSES TO PHQ QUESTIONS 1-9: 0
SUM OF ALL RESPONSES TO PHQ QUESTIONS 1-9: 0
5. POOR APPETITE OR OVEREATING: NOT AT ALL
6. FEELING BAD ABOUT YOURSELF - OR THAT YOU ARE A FAILURE OR HAVE LET YOURSELF OR YOUR FAMILY DOWN: NOT AT ALL
7. TROUBLE CONCENTRATING ON THINGS, SUCH AS READING THE NEWSPAPER OR WATCHING TELEVISION: NOT AT ALL
3. TROUBLE FALLING OR STAYING ASLEEP: NOT AT ALL
SUM OF ALL RESPONSES TO PHQ QUESTIONS 1-9: 0
4. FEELING TIRED OR HAVING LITTLE ENERGY: NOT AT ALL
10. IF YOU CHECKED OFF ANY PROBLEMS, HOW DIFFICULT HAVE THESE PROBLEMS MADE IT FOR YOU TO DO YOUR WORK, TAKE CARE OF THINGS AT HOME, OR GET ALONG WITH OTHER PEOPLE: NOT DIFFICULT AT ALL
9. THOUGHTS THAT YOU WOULD BE BETTER OFF DEAD, OR OF HURTING YOURSELF: NOT AT ALL
2. FEELING DOWN, DEPRESSED OR HOPELESS: NOT AT ALL
8. MOVING OR SPEAKING SO SLOWLY THAT OTHER PEOPLE COULD HAVE NOTICED. OR THE OPPOSITE, BEING SO FIGETY OR RESTLESS THAT YOU HAVE BEEN MOVING AROUND A LOT MORE THAN USUAL: NOT AT ALL
SUM OF ALL RESPONSES TO PHQ QUESTIONS 1-9: 0

## 2024-08-05 ASSESSMENT — ENCOUNTER SYMPTOMS
SWOLLEN GLANDS: 1
VISUAL CHANGE: 0
CHANGE IN BOWEL HABIT: 0
COUGH: 1
VOMITING: 0
ABDOMINAL PAIN: 0
SORE THROAT: 1
NAUSEA: 0

## 2024-08-05 NOTE — PROGRESS NOTES
8/5/2024    Chief Complaint   Patient presents with    Pharyngitis     Started last night and this morning her tonsils are really swollen        Blaire Hamm is a 44 y.o. female, presents today for:      ASSESSMENT/PLAN:    1. Sore throat  Negative POCT Strep- likely too soon for testing.  Will send for culture to r/o pathology  Discussed with patient symptoms should slowly resolve if viral.  If symptoms severely worsen over the next several days will consider antibiotic therapy  Encouraged OTC cough/ cold medication, Tylenol/ Ibuprofen PRN pain  Discussed non pharmacologic measures: Encouraged frequent water intake.  Encouraged Gatorade/ Powerade if not drinking fluids, intake of honey/ lemon tea for sore throat, Humidifier at night for cough, hot showers, Netti Pot if needed for congestion.    If no improvement, encouraged pt to call for F/U labs/ imaging    - POCT rapid strep A  - Culture, Throat      Return if symptoms worsen or fail to improve.    Pharyngitis  This is a new problem. The current episode started yesterday. The problem occurs constantly. The problem has been rapidly worsening. Associated symptoms include chills (and clamminess), congestion, coughing, diaphoresis, a sore throat and swollen glands. Pertinent negatives include no abdominal pain, anorexia, arthralgias, change in bowel habit, chest pain, fatigue, fever, headaches, joint swelling, myalgias, nausea, neck pain, numbness, rash, urinary symptoms, vertigo, visual change, vomiting or weakness. The symptoms are aggravated by drinking and eating. She has tried nothing for the symptoms.      Review of Systems   Constitutional:  Positive for chills (and clamminess) and diaphoresis. Negative for fatigue and fever.   HENT:  Positive for congestion and sore throat.    Respiratory:  Positive for cough.    Cardiovascular:  Negative for chest pain.   Gastrointestinal:  Negative for abdominal pain, anorexia, change in bowel habit, nausea and

## 2024-08-08 ENCOUNTER — TELEPHONE (OUTPATIENT)
Dept: FAMILY MEDICINE CLINIC | Age: 44
End: 2024-08-08

## 2024-08-08 DIAGNOSIS — J02.9 ACUTE PHARYNGITIS, UNSPECIFIED ETIOLOGY: Primary | ICD-10-CM

## 2024-08-08 RX ORDER — CLINDAMYCIN HYDROCHLORIDE 300 MG/1
300 CAPSULE ORAL 3 TIMES DAILY
Qty: 30 CAPSULE | Refills: 0 | Status: SHIPPED | OUTPATIENT
Start: 2024-08-08 | End: 2024-08-18

## 2024-08-08 NOTE — TELEPHONE ENCOUNTER
Pt reports she was seen in the office this Monday for a sore throat. As of today, she is not feeling any better. Her ears have started hurting, and her lymph nodes are now sore to the touch as well. She was told to call back if she didn't get any relief. Pt is wondering if there is anything that can be done to help relieve her symptoms?

## 2024-08-09 LAB — BACTERIA THROAT AEROBE CULT: NORMAL

## 2024-08-19 ENCOUNTER — TELEPHONE (OUTPATIENT)
Dept: FAMILY MEDICINE CLINIC | Age: 44
End: 2024-08-19

## 2024-08-19 NOTE — TELEPHONE ENCOUNTER
Patient calling to request a new medication for tremors, states medication has been discussed in the past but nothing was ever started due to drug interactions. States she spoke with pharmacist who recommended Sinemet, that interactions would be minimal. Please advise.    [facility]

## 2024-08-19 NOTE — TELEPHONE ENCOUNTER
I thought Physical Med/ Rehab was taking over her medications for her tremors? Did she discuss this with them?

## 2024-08-20 NOTE — TELEPHONE ENCOUNTER
She wasn't sure if she was supposed to talk to you first about it or them, I told her to discuss it with them and if they would not then let us know

## 2024-08-28 ENCOUNTER — PATIENT MESSAGE (OUTPATIENT)
Dept: FAMILY MEDICINE CLINIC | Age: 44
End: 2024-08-28

## 2024-08-28 DIAGNOSIS — M79.89 LEG SWELLING: Primary | ICD-10-CM

## 2024-08-30 RX ORDER — FUROSEMIDE 20 MG
20 TABLET ORAL DAILY PRN
Qty: 30 TABLET | Refills: 1 | Status: SHIPPED | OUTPATIENT
Start: 2024-08-30

## 2024-09-05 DIAGNOSIS — I10 ESSENTIAL HYPERTENSION: ICD-10-CM

## 2024-09-05 NOTE — TELEPHONE ENCOUNTER
Future appt scheduled 0 appt scheduled                           Last appt 08/15/2024      Last Written 03/26/2024      metoprolol succinate (TOPROL XL) 100 MG extended release tablet   #28  5 RF

## 2024-09-06 RX ORDER — METOPROLOL SUCCINATE 100 MG/1
TABLET, EXTENDED RELEASE ORAL
Qty: 28 TABLET | Refills: 5 | Status: SHIPPED | OUTPATIENT
Start: 2024-09-06

## 2024-09-27 ENCOUNTER — OFFICE VISIT (OUTPATIENT)
Dept: ORTHOPEDIC SURGERY | Age: 44
End: 2024-09-27
Payer: COMMERCIAL

## 2024-09-27 DIAGNOSIS — M17.11 PRIMARY LOCALIZED OSTEOARTHRITIS OF RIGHT KNEE: Primary | ICD-10-CM

## 2024-09-27 DIAGNOSIS — M25.551 RIGHT HIP PAIN: ICD-10-CM

## 2024-09-27 DIAGNOSIS — M54.50 LUMBAR BACK PAIN: ICD-10-CM

## 2024-09-27 PROCEDURE — G8428 CUR MEDS NOT DOCUMENT: HCPCS | Performed by: PHYSICIAN ASSISTANT

## 2024-09-27 PROCEDURE — G8417 CALC BMI ABV UP PARAM F/U: HCPCS | Performed by: PHYSICIAN ASSISTANT

## 2024-09-27 PROCEDURE — 1036F TOBACCO NON-USER: CPT | Performed by: PHYSICIAN ASSISTANT

## 2024-09-27 PROCEDURE — 99214 OFFICE O/P EST MOD 30 MIN: CPT | Performed by: PHYSICIAN ASSISTANT

## 2024-09-27 RX ORDER — METHYLPREDNISOLONE 4 MG
TABLET, DOSE PACK ORAL
Qty: 1 KIT | Refills: 0 | Status: SHIPPED | OUTPATIENT
Start: 2024-09-27 | End: 2024-10-03

## 2024-10-07 ENCOUNTER — TELEPHONE (OUTPATIENT)
Dept: ORTHOPEDIC SURGERY | Age: 44
End: 2024-10-07

## 2024-10-07 RX ORDER — METHYLPREDNISOLONE 4 MG
TABLET, DOSE PACK ORAL
Qty: 1 KIT | Refills: 0 | Status: SHIPPED | OUTPATIENT
Start: 2024-10-07 | End: 2024-10-09

## 2024-10-07 NOTE — TELEPHONE ENCOUNTER
Prescription Refill     Medication Name:  STEROID PACK  Pharmacy: [facility]  Patient Contact Number:  [phone]     THE PT LOST HER STEROID PACK AND NEEEDS A NEW ORDER SENT IN TO THE PHARMACY

## 2024-10-09 ENCOUNTER — OFFICE VISIT (OUTPATIENT)
Dept: FAMILY MEDICINE CLINIC | Age: 44
End: 2024-10-09
Payer: COMMERCIAL

## 2024-10-09 ENCOUNTER — OFFICE VISIT (OUTPATIENT)
Dept: ORTHOPEDIC SURGERY | Age: 44
End: 2024-10-09
Payer: COMMERCIAL

## 2024-10-09 VITALS
SYSTOLIC BLOOD PRESSURE: 116 MMHG | OXYGEN SATURATION: 95 % | BODY MASS INDEX: 48.59 KG/M2 | TEMPERATURE: 97.8 F | DIASTOLIC BLOOD PRESSURE: 82 MMHG | WEIGHT: 292 LBS | HEART RATE: 102 BPM

## 2024-10-09 VITALS — BODY MASS INDEX: 47.65 KG/M2 | HEIGHT: 65 IN | WEIGHT: 286 LBS

## 2024-10-09 DIAGNOSIS — I10 ESSENTIAL HYPERTENSION: ICD-10-CM

## 2024-10-09 DIAGNOSIS — E66.813 CLASS 3 SEVERE OBESITY DUE TO EXCESS CALORIES WITH SERIOUS COMORBIDITY AND BODY MASS INDEX (BMI) OF 45.0 TO 49.9 IN ADULT: ICD-10-CM

## 2024-10-09 DIAGNOSIS — Z23 NEED FOR INFLUENZA VACCINATION: ICD-10-CM

## 2024-10-09 DIAGNOSIS — M21.70 LEG LENGTH DISCREPANCY: ICD-10-CM

## 2024-10-09 DIAGNOSIS — R06.09 DOE (DYSPNEA ON EXERTION): Primary | ICD-10-CM

## 2024-10-09 DIAGNOSIS — M51.360 DEGENERATION OF INTERVERTEBRAL DISC OF LUMBAR REGION WITH DISCOGENIC BACK PAIN: Primary | ICD-10-CM

## 2024-10-09 DIAGNOSIS — E66.01 CLASS 3 SEVERE OBESITY DUE TO EXCESS CALORIES WITH SERIOUS COMORBIDITY AND BODY MASS INDEX (BMI) OF 45.0 TO 49.9 IN ADULT: ICD-10-CM

## 2024-10-09 DIAGNOSIS — R22.43 LOCALIZED SWELLING OF BOTH LOWER LEGS: ICD-10-CM

## 2024-10-09 PROCEDURE — 1036F TOBACCO NON-USER: CPT | Performed by: NURSE PRACTITIONER

## 2024-10-09 PROCEDURE — 3079F DIAST BP 80-89 MM HG: CPT | Performed by: NURSE PRACTITIONER

## 2024-10-09 PROCEDURE — 90661 CCIIV3 VAC ABX FR 0.5 ML IM: CPT | Performed by: NURSE PRACTITIONER

## 2024-10-09 PROCEDURE — 1036F TOBACCO NON-USER: CPT | Performed by: PHYSICIAN ASSISTANT

## 2024-10-09 PROCEDURE — 36415 COLL VENOUS BLD VENIPUNCTURE: CPT | Performed by: NURSE PRACTITIONER

## 2024-10-09 PROCEDURE — MISCD282 ADJUSTA LIFT: Performed by: PHYSICIAN ASSISTANT

## 2024-10-09 PROCEDURE — 90471 IMMUNIZATION ADMIN: CPT | Performed by: NURSE PRACTITIONER

## 2024-10-09 PROCEDURE — G8484 FLU IMMUNIZE NO ADMIN: HCPCS | Performed by: NURSE PRACTITIONER

## 2024-10-09 PROCEDURE — 99214 OFFICE O/P EST MOD 30 MIN: CPT | Performed by: NURSE PRACTITIONER

## 2024-10-09 PROCEDURE — G8427 DOCREV CUR MEDS BY ELIG CLIN: HCPCS | Performed by: PHYSICIAN ASSISTANT

## 2024-10-09 PROCEDURE — 3074F SYST BP LT 130 MM HG: CPT | Performed by: NURSE PRACTITIONER

## 2024-10-09 PROCEDURE — G8417 CALC BMI ABV UP PARAM F/U: HCPCS | Performed by: NURSE PRACTITIONER

## 2024-10-09 PROCEDURE — G8484 FLU IMMUNIZE NO ADMIN: HCPCS | Performed by: PHYSICIAN ASSISTANT

## 2024-10-09 PROCEDURE — G8427 DOCREV CUR MEDS BY ELIG CLIN: HCPCS | Performed by: NURSE PRACTITIONER

## 2024-10-09 PROCEDURE — G8417 CALC BMI ABV UP PARAM F/U: HCPCS | Performed by: PHYSICIAN ASSISTANT

## 2024-10-09 PROCEDURE — 93000 ELECTROCARDIOGRAM COMPLETE: CPT | Performed by: NURSE PRACTITIONER

## 2024-10-09 PROCEDURE — 99214 OFFICE O/P EST MOD 30 MIN: CPT | Performed by: PHYSICIAN ASSISTANT

## 2024-10-09 RX ORDER — CARBIDOPA AND LEVODOPA 25; 100 MG/1; MG/1
1 TABLET ORAL 3 TIMES DAILY
COMMUNITY
Start: 2024-09-25

## 2024-10-09 RX ORDER — CELECOXIB 200 MG/1
200 CAPSULE ORAL 2 TIMES DAILY
Qty: 60 CAPSULE | Refills: 1 | Status: SHIPPED | OUTPATIENT
Start: 2024-10-09

## 2024-10-09 RX ORDER — BUSPIRONE HYDROCHLORIDE 30 MG/1
30 TABLET ORAL 2 TIMES DAILY
COMMUNITY
Start: 2024-09-18

## 2024-10-09 ASSESSMENT — PATIENT HEALTH QUESTIONNAIRE - PHQ9
10. IF YOU CHECKED OFF ANY PROBLEMS, HOW DIFFICULT HAVE THESE PROBLEMS MADE IT FOR YOU TO DO YOUR WORK, TAKE CARE OF THINGS AT HOME, OR GET ALONG WITH OTHER PEOPLE: SOMEWHAT DIFFICULT
6. FEELING BAD ABOUT YOURSELF - OR THAT YOU ARE A FAILURE OR HAVE LET YOURSELF OR YOUR FAMILY DOWN: NOT AT ALL
SUM OF ALL RESPONSES TO PHQ QUESTIONS 1-9: 4
1. LITTLE INTEREST OR PLEASURE IN DOING THINGS: SEVERAL DAYS
9. THOUGHTS THAT YOU WOULD BE BETTER OFF DEAD, OR OF HURTING YOURSELF: NOT AT ALL
SUM OF ALL RESPONSES TO PHQ QUESTIONS 1-9: 4
SUM OF ALL RESPONSES TO PHQ9 QUESTIONS 1 & 2: 2
7. TROUBLE CONCENTRATING ON THINGS, SUCH AS READING THE NEWSPAPER OR WATCHING TELEVISION: NOT AT ALL
8. MOVING OR SPEAKING SO SLOWLY THAT OTHER PEOPLE COULD HAVE NOTICED. OR THE OPPOSITE, BEING SO FIGETY OR RESTLESS THAT YOU HAVE BEEN MOVING AROUND A LOT MORE THAN USUAL: NOT AT ALL
2. FEELING DOWN, DEPRESSED OR HOPELESS: SEVERAL DAYS
SUM OF ALL RESPONSES TO PHQ QUESTIONS 1-9: 4
4. FEELING TIRED OR HAVING LITTLE ENERGY: SEVERAL DAYS
SUM OF ALL RESPONSES TO PHQ QUESTIONS 1-9: 4
3. TROUBLE FALLING OR STAYING ASLEEP: SEVERAL DAYS
5. POOR APPETITE OR OVEREATING: NOT AT ALL

## 2024-10-09 NOTE — PROGRESS NOTES
(PROTONIX) 40 MG tablet TAKE ONE (1) TABLET BY MOUTH EVERY MORNING (BEFORE BREAKFAST) 28 tablet 11    acetaminophen (TYLENOL) 500 MG tablet Take 1 tablet by mouth every 6 hours as needed for Pain or Fever 60 tablet 5    VRAYLAR 4.5 MG CAPS capsule       busPIRone (BUSPAR) 15 MG tablet Take 15 mg by mouth 2 in am; 1 qhs      lamoTRIgine (LAMICTAL) 200 MG tablet Take 1 tablet by mouth nightly      escitalopram (LEXAPRO) 20 MG tablet TAKE ONE TABLET BY MOUTH DAILY 30 tablet 3     No current facility-administered medications on file prior to visit.     Allergies   Allergen Reactions    Sulfa Antibiotics     Amoxicillin Rash    Clavulanic Acid Rash    Hydrocodone-Acetaminophen Nausea And Vomiting    Ultram [Tramadol] Nausea And Vomiting    Vicodin [Hydrocodone-Acetaminophen] Nausea And Vomiting     Past Medical History:   Diagnosis Date    Bipolar disorder (HCC)     Cerebral palsy (HCC)     legs;mainly right    Depression     Hypertension     PONV (postoperative nausea and vomiting)      Past Surgical History:   Procedure Laterality Date    FOOT SURGERY      KNEE SURGERY Left 6/12/15    LEG SURGERY      TOTAL KNEE ARTHROPLASTY Left 3/15/2023    LEFT TOTAL KNEE ARTHROPLASTY           RONEL BIOMET NOTE: ADDUCTOR CANAL BLOCK, IPAC performed by Gt Diaz MD at [facility] OR     Social History     Socioeconomic History    Marital status:      Spouse name: Not on file    Number of children: Not on file    Years of education: Not on file    Highest education level: Not on file   Occupational History    Not on file   Tobacco Use    Smoking status: Never    Smokeless tobacco: Never   Vaping Use    Vaping status: Never Used   Substance and Sexual Activity    Alcohol use: No     Alcohol/week: 0.0 standard drinks of alcohol    Drug use: No    Sexual activity: Yes     Partners: Male   Other Topics Concern    Not on file   Social History Narrative    8/2012 lives with parents and 2 sons;and brother;unemployed;     Social

## 2024-10-09 NOTE — PROGRESS NOTES
New Patient: LUMBAR SPINE    Referring Provider:  Gt Diaz MD    CHIEF COMPLAINT:    Chief Complaint   Patient presents with    Back Pain     lumbar       HISTORY OF PRESENT ILLNESS:       Ms. Blaire Hamm  is a pleasant 44 y.o. female, who has a history of cerebral palsy, here for evaluation regarding her LBP . She states her pain began insidiously several years ago.  She states that over the past year she has been experiencing increasing pain within her low back slightly above her waistline.  She states the pain is constant and she rates her back pain 7/10 with bilateral buttock pain 9/10 with radiation into the anterior aspect of her thighs 5/10.  She states that at times the pain can interfere with her sleep and she finds that she can only stand and walk for less than 10 minutes utilizing a rollator and she does use a cane in her home.  She does have a sense of weakness into both lower extremity and her lower back.  She does find standing walking and balance difficult due to the cerebral palsy.  She does find that sittings better than standing.  She has had no previous treatment and she has been in physical therapy on a regular basis for her cerebral palsy.  She states that she has run out of benefits for physical therapy at this point.  She denies any recent bowel or bladder dysfunction or saddle anesthesia.  She has been taking Robaxin and had a recent Medrol Dosepak with some benefit.   Pain Assessment  Location of Pain: Back  Location Modifiers: Other (Comment)  Severity of Pain: 7  Quality of Pain: Other (Comment)  Duration of Pain: Persistent  Frequency of Pain: Constant]  Current/Past Treatment:   Physical Therapy: Recently for her cerebral palsy  Chiropractic: None  Injection: None  Medications: Flexeril    Past Medical History:   Past Medical History:   Diagnosis Date    Bipolar disorder (HCC)     Cerebral palsy (HCC)     legs;mainly right    Depression     Hypertension     PONV

## 2024-10-10 ENCOUNTER — TELEPHONE (OUTPATIENT)
Dept: ORTHOPEDIC SURGERY | Age: 44
End: 2024-10-10

## 2024-10-10 DIAGNOSIS — M17.11 PRIMARY LOCALIZED OSTEOARTHRITIS OF RIGHT KNEE: Primary | ICD-10-CM

## 2024-10-10 LAB
ALBUMIN SERPL-MCNC: 4.3 G/DL (ref 3.4–5)
ALBUMIN/GLOB SERPL: 1.4 {RATIO} (ref 1.1–2.2)
ALP SERPL-CCNC: 89 U/L (ref 40–129)
ALT SERPL-CCNC: 20 U/L (ref 10–40)
ANION GAP SERPL CALCULATED.3IONS-SCNC: 15 MMOL/L (ref 3–16)
AST SERPL-CCNC: 16 U/L (ref 15–37)
BILIRUB SERPL-MCNC: 0.3 MG/DL (ref 0–1)
BUN SERPL-MCNC: 11 MG/DL (ref 7–20)
CALCIUM SERPL-MCNC: 10 MG/DL (ref 8.3–10.6)
CHLORIDE SERPL-SCNC: 98 MMOL/L (ref 99–110)
CHOLEST SERPL-MCNC: 181 MG/DL (ref 0–199)
CO2 SERPL-SCNC: 27 MMOL/L (ref 21–32)
CREAT SERPL-MCNC: 1 MG/DL (ref 0.6–1.1)
GFR SERPLBLD CREATININE-BSD FMLA CKD-EPI: 71 ML/MIN/{1.73_M2}
GLUCOSE SERPL-MCNC: 105 MG/DL (ref 70–99)
HDLC SERPL-MCNC: 46 MG/DL (ref 40–60)
LDLC SERPL CALC-MCNC: 108 MG/DL
POTASSIUM SERPL-SCNC: 3.6 MMOL/L (ref 3.5–5.1)
PROT SERPL-MCNC: 7.4 G/DL (ref 6.4–8.2)
SODIUM SERPL-SCNC: 140 MMOL/L (ref 136–145)
TRIGL SERPL-MCNC: 133 MG/DL (ref 0–150)
VLDLC SERPL CALC-MCNC: 27 MG/DL

## 2024-10-10 NOTE — TELEPHONE ENCOUNTER
APPROVAL DUROLANE: RT KNEE    DATE 10/10/24        DRUG DUROLANE   SERIES OF 1      Approval Auth # [id number] and Dates 10/8/24 - 4/8/25.  PER FAX FROM [email or web address].  SAVED TO MEDIA..      RIGHT KNEE     Lenny and Rob     Spoke with pt and scheduled appt 10/22 egate pm. adelfo

## 2024-10-11 DIAGNOSIS — M79.89 LEG SWELLING: ICD-10-CM

## 2024-10-11 NOTE — TELEPHONE ENCOUNTER
Refill Request     CONFIRM preferrred pharmacy with the patient.    If Mail Order Rx - Pend for 90 day refill.      Last Seen: Last Seen Department: 10/9/2024  Last Seen by PCP: 10/9/2024    Last Written: 08/30/2024    If no future appointment scheduled, route STAFF MESSAGE with patient name to the  Pool for scheduling.      Next Appointment:   Future Appointments   Date Time Provider Department Center   10/22/2024  2:45 PM Gt Diaz MD [facility]   11/20/2024  3:40 PM Mallory Sapp APRN - CNP [facility]       Message sent to  to schedule appt with patient?  N/A      Requested Prescriptions     Pending Prescriptions Disp Refills    furosemide (LASIX) 20 MG tablet [Pharmacy Med Name: FUROSEMIDE 20MG TABLET] 30 tablet 1     Sig: TAKE ONE (1) TABLET BY MOUTH DAILY AS NEEDED (LEG SWELLING)

## 2024-10-11 NOTE — RESULT ENCOUNTER NOTE
Normal kidney/ liver function. Fasting blood sugar slightly elevated.      Cholesterol is better from 8 months ago.

## 2024-10-12 RX ORDER — FUROSEMIDE 20 MG
TABLET ORAL
Qty: 30 TABLET | Refills: 1 | Status: SHIPPED | OUTPATIENT
Start: 2024-10-12

## 2024-10-22 ENCOUNTER — OFFICE VISIT (OUTPATIENT)
Dept: ORTHOPEDIC SURGERY | Age: 44
End: 2024-10-22

## 2024-10-22 VITALS — WEIGHT: 286 LBS | HEIGHT: 65 IN | BODY MASS INDEX: 47.65 KG/M2

## 2024-10-22 DIAGNOSIS — M17.11 PRIMARY LOCALIZED OSTEOARTHRITIS OF RIGHT KNEE: Primary | ICD-10-CM

## 2024-10-22 RX ORDER — TRIAMCINOLONE ACETONIDE 40 MG/ML
40 INJECTION, SUSPENSION INTRA-ARTICULAR; INTRAMUSCULAR ONCE
Status: COMPLETED | OUTPATIENT
Start: 2024-10-22 | End: 2024-10-22

## 2024-10-22 RX ORDER — LIDOCAINE HYDROCHLORIDE 10 MG/ML
4 INJECTION, SOLUTION INFILTRATION; PERINEURAL ONCE
Status: COMPLETED | OUTPATIENT
Start: 2024-10-22 | End: 2024-10-22

## 2024-10-22 RX ADMIN — TRIAMCINOLONE ACETONIDE 40 MG: 40 INJECTION, SUSPENSION INTRA-ARTICULAR; INTRAMUSCULAR at 15:06

## 2024-10-22 RX ADMIN — LIDOCAINE HYDROCHLORIDE 4 ML: 10 INJECTION, SOLUTION INFILTRATION; PERINEURAL at 15:05

## 2024-10-22 NOTE — PROGRESS NOTES
History: Patient presents for knee injection. No significant changes in history. Ran out of PT visits and working on home EP. Good relief from previous injections    Exam: Unchanged, see previous    Procedure: Risks benefits limitations and alternatives to R knee injection were discussed with patient who wished to proceed.  Under aseptic technique Durolane, 4cc 1% lidocaine and 40mg Kenalog were injected into the knee without difficulty.  There was no complications in the patient tolerated the procedure well.    Assessment: 45 y/o F with R knee OA, PF OA and hx of CP affecting mobility    Plan: R knee injection performed today, follow up PRN, will continue HEP and PT with PMR.      Gt Diaz MD

## 2024-11-13 ENCOUNTER — TELEPHONE (OUTPATIENT)
Dept: FAMILY MEDICINE CLINIC | Age: 44
End: 2024-11-13

## 2024-11-13 DIAGNOSIS — R05.1 ACUTE COUGH: Primary | ICD-10-CM

## 2024-11-13 RX ORDER — DEXTROMETHORPHAN HYDROBROMIDE AND PROMETHAZINE HYDROCHLORIDE 15; 6.25 MG/5ML; MG/5ML
5 SYRUP ORAL 4 TIMES DAILY PRN
Qty: 200 ML | Refills: 0 | Status: SHIPPED | OUTPATIENT
Start: 2024-11-13 | End: 2024-11-23

## 2024-11-13 NOTE — TELEPHONE ENCOUNTER
Patient states that is fine as long as it interacts well with her medications, please send to [facility].

## 2024-11-13 NOTE — TELEPHONE ENCOUNTER
Patient c/o productive cough and nasal drainage x 2 days.  Drainage is clear.  She was wanting to see if you would prescribe cough medication for her to [facility]

## 2024-11-20 ENCOUNTER — OFFICE VISIT (OUTPATIENT)
Dept: FAMILY MEDICINE CLINIC | Age: 44
End: 2024-11-20
Payer: COMMERCIAL

## 2024-11-20 VITALS
DIASTOLIC BLOOD PRESSURE: 80 MMHG | HEART RATE: 87 BPM | BODY MASS INDEX: 49.26 KG/M2 | SYSTOLIC BLOOD PRESSURE: 122 MMHG | WEIGHT: 293 LBS | OXYGEN SATURATION: 98 %

## 2024-11-20 DIAGNOSIS — E66.01 CLASS 3 SEVERE OBESITY DUE TO EXCESS CALORIES WITH SERIOUS COMORBIDITY AND BODY MASS INDEX (BMI) OF 45.0 TO 49.9 IN ADULT: Primary | ICD-10-CM

## 2024-11-20 DIAGNOSIS — G80.8 OTHER CEREBRAL PALSY (HCC): ICD-10-CM

## 2024-11-20 DIAGNOSIS — R42 DIZZINESS: ICD-10-CM

## 2024-11-20 DIAGNOSIS — E66.813 CLASS 3 SEVERE OBESITY DUE TO EXCESS CALORIES WITH SERIOUS COMORBIDITY AND BODY MASS INDEX (BMI) OF 45.0 TO 49.9 IN ADULT: Primary | ICD-10-CM

## 2024-11-20 DIAGNOSIS — I10 PRIMARY HYPERTENSION: ICD-10-CM

## 2024-11-20 LAB
BILIRUBIN, POC: ABNORMAL
BLOOD URINE, POC: ABNORMAL
CLARITY, POC: CLEAR
COLOR, POC: ABNORMAL
CONTROL: NORMAL
GLUCOSE URINE, POC: ABNORMAL MG/DL
KETONES, POC: ABNORMAL MG/DL
LEUKOCYTE EST, POC: ABNORMAL
NITRITE, POC: ABNORMAL
PH, POC: 7
PREGNANCY TEST URINE, POC: NEGATIVE
PROTEIN, POC: ABNORMAL MG/DL
SPECIFIC GRAVITY, POC: 1.01
UROBILINOGEN, POC: 0.2 MG/DL

## 2024-11-20 PROCEDURE — G8417 CALC BMI ABV UP PARAM F/U: HCPCS | Performed by: NURSE PRACTITIONER

## 2024-11-20 PROCEDURE — G8484 FLU IMMUNIZE NO ADMIN: HCPCS | Performed by: NURSE PRACTITIONER

## 2024-11-20 PROCEDURE — 99214 OFFICE O/P EST MOD 30 MIN: CPT | Performed by: NURSE PRACTITIONER

## 2024-11-20 PROCEDURE — 81002 URINALYSIS NONAUTO W/O SCOPE: CPT | Performed by: NURSE PRACTITIONER

## 2024-11-20 PROCEDURE — 3079F DIAST BP 80-89 MM HG: CPT | Performed by: NURSE PRACTITIONER

## 2024-11-20 PROCEDURE — 81025 URINE PREGNANCY TEST: CPT | Performed by: NURSE PRACTITIONER

## 2024-11-20 PROCEDURE — 1036F TOBACCO NON-USER: CPT | Performed by: NURSE PRACTITIONER

## 2024-11-20 PROCEDURE — G8427 DOCREV CUR MEDS BY ELIG CLIN: HCPCS | Performed by: NURSE PRACTITIONER

## 2024-11-20 PROCEDURE — 3074F SYST BP LT 130 MM HG: CPT | Performed by: NURSE PRACTITIONER

## 2024-11-20 RX ORDER — BUSPIRONE HYDROCHLORIDE 10 MG/1
20 TABLET ORAL NIGHTLY
COMMUNITY
Start: 2024-11-13

## 2024-11-20 RX ORDER — PHENTERMINE HYDROCHLORIDE 15 MG/1
15 CAPSULE ORAL EVERY MORNING
Qty: 30 CAPSULE | Refills: 0 | Status: SHIPPED | OUTPATIENT
Start: 2024-11-20 | End: 2024-12-20

## 2024-11-20 RX ORDER — MECLIZINE HCL 12.5 MG 12.5 MG/1
12.5 TABLET ORAL 3 TIMES DAILY PRN
Qty: 30 TABLET | Refills: 5 | Status: SHIPPED | OUTPATIENT
Start: 2024-11-20

## 2024-11-20 ASSESSMENT — PATIENT HEALTH QUESTIONNAIRE - PHQ9
SUM OF ALL RESPONSES TO PHQ QUESTIONS 1-9: 2
7. TROUBLE CONCENTRATING ON THINGS, SUCH AS READING THE NEWSPAPER OR WATCHING TELEVISION: NOT AT ALL
6. FEELING BAD ABOUT YOURSELF - OR THAT YOU ARE A FAILURE OR HAVE LET YOURSELF OR YOUR FAMILY DOWN: NOT AT ALL
SUM OF ALL RESPONSES TO PHQ QUESTIONS 1-9: 2
2. FEELING DOWN, DEPRESSED OR HOPELESS: SEVERAL DAYS
SUM OF ALL RESPONSES TO PHQ QUESTIONS 1-9: 2
8. MOVING OR SPEAKING SO SLOWLY THAT OTHER PEOPLE COULD HAVE NOTICED. OR THE OPPOSITE, BEING SO FIGETY OR RESTLESS THAT YOU HAVE BEEN MOVING AROUND A LOT MORE THAN USUAL: NOT AT ALL
1. LITTLE INTEREST OR PLEASURE IN DOING THINGS: SEVERAL DAYS
SUM OF ALL RESPONSES TO PHQ QUESTIONS 1-9: 2
5. POOR APPETITE OR OVEREATING: NOT AT ALL
SUM OF ALL RESPONSES TO PHQ9 QUESTIONS 1 & 2: 2
3. TROUBLE FALLING OR STAYING ASLEEP: NOT AT ALL
4. FEELING TIRED OR HAVING LITTLE ENERGY: NOT AT ALL
10. IF YOU CHECKED OFF ANY PROBLEMS, HOW DIFFICULT HAVE THESE PROBLEMS MADE IT FOR YOU TO DO YOUR WORK, TAKE CARE OF THINGS AT HOME, OR GET ALONG WITH OTHER PEOPLE: NOT DIFFICULT AT ALL
9. THOUGHTS THAT YOU WOULD BE BETTER OFF DEAD, OR OF HURTING YOURSELF: NOT AT ALL

## 2024-11-20 NOTE — PROGRESS NOTES
11/20/2024    Chief Complaint   Patient presents with    Obesity     Would like to discuss med for weight loss        Blaire Hamm is a 44 y.o. female, presents today for:      ASSESSMENT/PLAN:  1. Class 3 severe obesity due to excess calories with serious comorbidity and body mass index (BMI) of 45.0 to 49.9 in adult  Trial Phentermine.  Will start at lower dose due to concurrent HTN.  Discussed r/b/a, pt agreeable to proceed  5% weight loss goal 15 lb by 2/2024 or 281  WC 57 in  Encouraged diet/ exercise changes  Negative POCT UPT  - POCT Urinalysis no Micro  - phentermine 15 MG capsule; Take 1 capsule by mouth every morning for 30 days. Max Daily Amount: 15 mg  Dispense: 30 capsule; Refill: 0  - POCT urine pregnancy    2. Primary hypertension  Controlled today  Continue current medication    3. Dizziness  Symptomatic but overall controlled  Continue Meclizine  - meclizine (ANTIVERT) 12.5 MG tablet; Take 1 tablet by mouth 3 times daily as needed for Dizziness or Nausea  Dispense: 30 tablet; Refill: 5    4. Other cerebral palsy (HCC)  Monitor    Return in about 4 weeks (around 12/18/2024).    Presenting today for obesity.  Has attempting monitoring food intake with no improvement.  Gain of 10 lbs since last OV.  Requesting to start medications today.  Trying to watch salt intake but struggling.  No consistent structured exercise.  Limited due to cerebral palsy and persistent knee pain.  Also continues to be in school and raising her two boys.  Reports intermittent MCMANUS with activity.  No CP, N/V, headaches.  No prior Cardiac hx.  No known family history         11/20/2024     3:53 PM 10/9/2024     3:53 PM 8/5/2024     1:57 PM 4/11/2024     9:43 AM 1/8/2024     2:26 PM 12/8/2023     7:06 AM 2/28/2023     9:56 AM   PHQ Scores   PHQ2 Score 2 2  0 2 2 0   PHQ9 Score 2 4 0 0 2 2 0     Interpretation of Total Score Depression Severity: 1-4 = Minimal depression, 5-9 = Mild depression, 10-14 = Moderate depression,

## 2024-11-22 NOTE — RESULT ENCOUNTER NOTE
Urine showing slightly amount of blood and leukocytes.  Any UTI symptoms?  If not then we can just monitor

## 2024-12-18 ENCOUNTER — OFFICE VISIT (OUTPATIENT)
Dept: FAMILY MEDICINE CLINIC | Age: 44
End: 2024-12-18
Payer: COMMERCIAL

## 2024-12-18 VITALS
WEIGHT: 289 LBS | BODY MASS INDEX: 48.09 KG/M2 | HEART RATE: 88 BPM | SYSTOLIC BLOOD PRESSURE: 122 MMHG | DIASTOLIC BLOOD PRESSURE: 74 MMHG | OXYGEN SATURATION: 97 % | TEMPERATURE: 98.6 F

## 2024-12-18 DIAGNOSIS — I10 PRIMARY HYPERTENSION: Primary | ICD-10-CM

## 2024-12-18 DIAGNOSIS — E66.01 CLASS 3 SEVERE OBESITY DUE TO EXCESS CALORIES WITH SERIOUS COMORBIDITY AND BODY MASS INDEX (BMI) OF 45.0 TO 49.9 IN ADULT: ICD-10-CM

## 2024-12-18 DIAGNOSIS — E66.813 CLASS 3 SEVERE OBESITY DUE TO EXCESS CALORIES WITH SERIOUS COMORBIDITY AND BODY MASS INDEX (BMI) OF 45.0 TO 49.9 IN ADULT: ICD-10-CM

## 2024-12-18 PROCEDURE — G8484 FLU IMMUNIZE NO ADMIN: HCPCS | Performed by: NURSE PRACTITIONER

## 2024-12-18 PROCEDURE — 3074F SYST BP LT 130 MM HG: CPT | Performed by: NURSE PRACTITIONER

## 2024-12-18 PROCEDURE — G8417 CALC BMI ABV UP PARAM F/U: HCPCS | Performed by: NURSE PRACTITIONER

## 2024-12-18 PROCEDURE — 1036F TOBACCO NON-USER: CPT | Performed by: NURSE PRACTITIONER

## 2024-12-18 PROCEDURE — G8427 DOCREV CUR MEDS BY ELIG CLIN: HCPCS | Performed by: NURSE PRACTITIONER

## 2024-12-18 PROCEDURE — 3078F DIAST BP <80 MM HG: CPT | Performed by: NURSE PRACTITIONER

## 2024-12-18 PROCEDURE — 99214 OFFICE O/P EST MOD 30 MIN: CPT | Performed by: NURSE PRACTITIONER

## 2024-12-18 RX ORDER — PHENTERMINE HYDROCHLORIDE 37.5 MG/1
37.5 CAPSULE ORAL EVERY MORNING
Qty: 30 CAPSULE | Refills: 0 | Status: SHIPPED | OUTPATIENT
Start: 2024-12-18 | End: 2025-01-17

## 2024-12-18 RX ORDER — PHENTERMINE HYDROCHLORIDE 15 MG/1
15 CAPSULE ORAL EVERY MORNING
Qty: 30 CAPSULE | Refills: 0 | Status: CANCELLED | OUTPATIENT
Start: 2024-12-18 | End: 2025-01-17

## 2024-12-18 ASSESSMENT — PATIENT HEALTH QUESTIONNAIRE - PHQ9
SUM OF ALL RESPONSES TO PHQ QUESTIONS 1-9: 3
6. FEELING BAD ABOUT YOURSELF - OR THAT YOU ARE A FAILURE OR HAVE LET YOURSELF OR YOUR FAMILY DOWN: NOT AT ALL
7. TROUBLE CONCENTRATING ON THINGS, SUCH AS READING THE NEWSPAPER OR WATCHING TELEVISION: NOT AT ALL
5. POOR APPETITE OR OVEREATING: NOT AT ALL
SUM OF ALL RESPONSES TO PHQ QUESTIONS 1-9: 3
3. TROUBLE FALLING OR STAYING ASLEEP: SEVERAL DAYS
8. MOVING OR SPEAKING SO SLOWLY THAT OTHER PEOPLE COULD HAVE NOTICED. OR THE OPPOSITE, BEING SO FIGETY OR RESTLESS THAT YOU HAVE BEEN MOVING AROUND A LOT MORE THAN USUAL: NOT AT ALL
SUM OF ALL RESPONSES TO PHQ9 QUESTIONS 1 & 2: 2
10. IF YOU CHECKED OFF ANY PROBLEMS, HOW DIFFICULT HAVE THESE PROBLEMS MADE IT FOR YOU TO DO YOUR WORK, TAKE CARE OF THINGS AT HOME, OR GET ALONG WITH OTHER PEOPLE: NOT DIFFICULT AT ALL
1. LITTLE INTEREST OR PLEASURE IN DOING THINGS: SEVERAL DAYS
9. THOUGHTS THAT YOU WOULD BE BETTER OFF DEAD, OR OF HURTING YOURSELF: NOT AT ALL
4. FEELING TIRED OR HAVING LITTLE ENERGY: NOT AT ALL
2. FEELING DOWN, DEPRESSED OR HOPELESS: SEVERAL DAYS
SUM OF ALL RESPONSES TO PHQ QUESTIONS 1-9: 3
SUM OF ALL RESPONSES TO PHQ QUESTIONS 1-9: 3

## 2024-12-18 NOTE — PROGRESS NOTES
12/18/2024    Chief Complaint   Patient presents with    Obesity     Would like to get the phentermine increased        Blaire Hamm is a 44 y.o. female, presents today for:      ASSESSMENT/PLAN:  1. Class 3 severe obesity due to excess calories with serious comorbidity and body mass index (BMI) of 45.0 to 49.9 in adult  Continue Phentermine for M#2.  7 lbs weight loss since last OV.  Increase to 37.5 mg  5% weight loss goal 15 lb by 2/2024 or 281  WC 57 in--> 55 in  Encouraged diet/ exercise changes  Negative POCT UPT  - phentermine 37.5 MG capsule; Take 1 capsule by mouth every morning for 30 days. Max Daily Amount: 37.5 mg  Dispense: 30 capsule; Refill: 0    2. Primary hypertension  Controlled today  Continue current medications.   Return in about 4 weeks (around 1/15/2025).    Presenting today for obesity.  7 lb weight loss after starting Phentermine 15 mg.  No side effects noted.  Has attempting monitoring food intake with no improvement.  Gain of 10 lbs since last OV.  Requesting to start medications today.  Trying to watch salt intake but struggling.  No consistent structured exercise.  Limited due to cerebral palsy and persistent knee pain.  Also continues to be in school and raising her two boys.  Reports intermittent MCMANUS with activity.  No CP, N/V, headaches.  No prior Cardiac hx.  No known family history         12/18/2024    11:51 AM 11/20/2024     3:53 PM 10/9/2024     3:53 PM 8/5/2024     1:57 PM 4/11/2024     9:43 AM 1/8/2024     2:26 PM 12/8/2023     7:06 AM   PHQ Scores   PHQ2 Score 2 2 2  0 2 2   PHQ9 Score 3 2 4 0 0 2 2     Interpretation of Total Score Depression Severity: 1-4 = Minimal depression, 5-9 = Mild depression, 10-14 = Moderate depression, 15-19 = Moderately severe depression, 20-27 = Severe depression       Lab Results   Component Value Date     10/09/2024    K 3.6 10/09/2024    CL 98 (L) 10/09/2024    CO2 27 10/09/2024    BUN 11 10/09/2024    CREATININE 1.0 10/09/2024

## 2024-12-19 ENCOUNTER — TELEPHONE (OUTPATIENT)
Dept: ADMINISTRATIVE | Age: 44
End: 2024-12-19

## 2024-12-19 NOTE — TELEPHONE ENCOUNTER
Submitted PA for Phentermine HCl 37.5MG capsules   Via CMM Key: [id number] STATUS: MESSAGE FROM PLAN     Outcome  Additional Information Required  Electronic prior authorization not supported as NDC is not payable.    This medication is a Plan Exclusion; not a Denial.  The option for Appeal is not available because it is not a covered product.    If the patient is persistent that they want a specific medication that is not covered by the plan; then they can call the insurance and attempt to get a Formulary Override, or a Coverage Determination.      If this requires a response please respond to the pool ( P MHCX PSC MEDICATION PRE-AUTH).      Thank you please advise patient.

## 2025-01-02 DIAGNOSIS — R11.2 NON-INTRACTABLE VOMITING WITH NAUSEA: ICD-10-CM

## 2025-01-02 DIAGNOSIS — M79.89 LEG SWELLING: ICD-10-CM

## 2025-01-02 DIAGNOSIS — K21.00 GASTROESOPHAGEAL REFLUX DISEASE WITH ESOPHAGITIS WITHOUT HEMORRHAGE: ICD-10-CM

## 2025-01-02 RX ORDER — PANTOPRAZOLE SODIUM 40 MG/1
TABLET, DELAYED RELEASE ORAL
Qty: 28 TABLET | Refills: 5 | Status: SHIPPED | OUTPATIENT
Start: 2025-01-02

## 2025-01-02 RX ORDER — FUROSEMIDE 20 MG/1
TABLET ORAL
Qty: 30 TABLET | Refills: 5 | Status: SHIPPED | OUTPATIENT
Start: 2025-01-02

## 2025-01-15 ENCOUNTER — OFFICE VISIT (OUTPATIENT)
Dept: FAMILY MEDICINE CLINIC | Age: 45
End: 2025-01-15
Payer: COMMERCIAL

## 2025-01-15 VITALS
TEMPERATURE: 97.2 F | WEIGHT: 284 LBS | HEART RATE: 103 BPM | BODY MASS INDEX: 47.26 KG/M2 | SYSTOLIC BLOOD PRESSURE: 128 MMHG | OXYGEN SATURATION: 96 % | DIASTOLIC BLOOD PRESSURE: 82 MMHG

## 2025-01-15 DIAGNOSIS — E66.813 CLASS 3 SEVERE OBESITY DUE TO EXCESS CALORIES WITH SERIOUS COMORBIDITY AND BODY MASS INDEX (BMI) OF 45.0 TO 49.9 IN ADULT: ICD-10-CM

## 2025-01-15 DIAGNOSIS — E66.01 CLASS 3 SEVERE OBESITY DUE TO EXCESS CALORIES WITH SERIOUS COMORBIDITY AND BODY MASS INDEX (BMI) OF 45.0 TO 49.9 IN ADULT: ICD-10-CM

## 2025-01-15 PROCEDURE — 99214 OFFICE O/P EST MOD 30 MIN: CPT | Performed by: NURSE PRACTITIONER

## 2025-01-15 PROCEDURE — 3074F SYST BP LT 130 MM HG: CPT | Performed by: NURSE PRACTITIONER

## 2025-01-15 PROCEDURE — 3079F DIAST BP 80-89 MM HG: CPT | Performed by: NURSE PRACTITIONER

## 2025-01-15 PROCEDURE — G8417 CALC BMI ABV UP PARAM F/U: HCPCS | Performed by: NURSE PRACTITIONER

## 2025-01-15 PROCEDURE — 1036F TOBACCO NON-USER: CPT | Performed by: NURSE PRACTITIONER

## 2025-01-15 PROCEDURE — G8427 DOCREV CUR MEDS BY ELIG CLIN: HCPCS | Performed by: NURSE PRACTITIONER

## 2025-01-15 RX ORDER — PHENTERMINE HYDROCHLORIDE 37.5 MG/1
37.5 CAPSULE ORAL EVERY MORNING
Qty: 30 CAPSULE | Refills: 0 | Status: SHIPPED | OUTPATIENT
Start: 2025-01-15 | End: 2025-02-14

## 2025-01-15 RX ORDER — BUPROPION HYDROCHLORIDE 150 MG/1
150 TABLET ORAL EVERY MORNING
COMMUNITY
Start: 2025-01-13

## 2025-01-15 SDOH — ECONOMIC STABILITY: FOOD INSECURITY: WITHIN THE PAST 12 MONTHS, YOU WORRIED THAT YOUR FOOD WOULD RUN OUT BEFORE YOU GOT MONEY TO BUY MORE.: NEVER TRUE

## 2025-01-15 SDOH — ECONOMIC STABILITY: FOOD INSECURITY: WITHIN THE PAST 12 MONTHS, THE FOOD YOU BOUGHT JUST DIDN'T LAST AND YOU DIDN'T HAVE MONEY TO GET MORE.: NEVER TRUE

## 2025-01-15 ASSESSMENT — PATIENT HEALTH QUESTIONNAIRE - PHQ9
3. TROUBLE FALLING OR STAYING ASLEEP: NEARLY EVERY DAY
SUM OF ALL RESPONSES TO PHQ QUESTIONS 1-9: 16
7. TROUBLE CONCENTRATING ON THINGS, SUCH AS READING THE NEWSPAPER OR WATCHING TELEVISION: NEARLY EVERY DAY
9. THOUGHTS THAT YOU WOULD BE BETTER OFF DEAD, OR OF HURTING YOURSELF: NOT AT ALL
6. FEELING BAD ABOUT YOURSELF - OR THAT YOU ARE A FAILURE OR HAVE LET YOURSELF OR YOUR FAMILY DOWN: NEARLY EVERY DAY
1. LITTLE INTEREST OR PLEASURE IN DOING THINGS: NEARLY EVERY DAY
SUM OF ALL RESPONSES TO PHQ QUESTIONS 1-9: 16
5. POOR APPETITE OR OVEREATING: NOT AT ALL
4. FEELING TIRED OR HAVING LITTLE ENERGY: NOT AT ALL
10. IF YOU CHECKED OFF ANY PROBLEMS, HOW DIFFICULT HAVE THESE PROBLEMS MADE IT FOR YOU TO DO YOUR WORK, TAKE CARE OF THINGS AT HOME, OR GET ALONG WITH OTHER PEOPLE: VERY DIFFICULT
8. MOVING OR SPEAKING SO SLOWLY THAT OTHER PEOPLE COULD HAVE NOTICED. OR THE OPPOSITE, BEING SO FIGETY OR RESTLESS THAT YOU HAVE BEEN MOVING AROUND A LOT MORE THAN USUAL: SEVERAL DAYS
SUM OF ALL RESPONSES TO PHQ QUESTIONS 1-9: 16
2. FEELING DOWN, DEPRESSED OR HOPELESS: NEARLY EVERY DAY
SUM OF ALL RESPONSES TO PHQ9 QUESTIONS 1 & 2: 6
SUM OF ALL RESPONSES TO PHQ QUESTIONS 1-9: 16

## 2025-01-15 NOTE — PROGRESS NOTES
Unable to Pay for Housing in the Last Year: No     Number of Times Moved in the Last Year: 0     Homeless in the Last Year: No     History reviewed. No pertinent family history.      Vitals:    01/15/25 1126   BP: 128/82   Pulse: (!) 103   Temp: 97.2 °F (36.2 °C)   TempSrc: Infrared   SpO2: 96%   Weight: 128.8 kg (284 lb)       Estimated body mass index is 47.26 kg/m² as calculated from the following:    Height as of 10/22/24: 1.651 m (5' 5\").    Weight as of this encounter: 128.8 kg (284 lb).    Physical Exam  Vitals reviewed.   Constitutional:       Appearance: Normal appearance. She is obese.   HENT:      Head: Normocephalic.   Neck:      Vascular: No carotid bruit.   Cardiovascular:      Rate and Rhythm: Normal rate and regular rhythm.      Pulses: Normal pulses.           Carotid pulses are 2+ on the right side and 2+ on the left side.       Dorsalis pedis pulses are 2+ on the right side and 2+ on the left side.        Posterior tibial pulses are 2+ on the right side and 2+ on the left side.      Heart sounds: Normal heart sounds. No murmur heard.     No gallop.   Pulmonary:      Effort: Pulmonary effort is normal.      Breath sounds: Normal breath sounds.   Abdominal:      General: Abdomen is flat.      Palpations: Abdomen is soft.   Musculoskeletal:         General: Normal range of motion.      Cervical back: Normal range of motion.      Right lower leg: No edema.      Left lower leg: No edema.   Lymphadenopathy:      Cervical: No cervical adenopathy.   Skin:     General: Skin is warm and dry.      Capillary Refill: Capillary refill takes less than 2 seconds.   Neurological:      General: No focal deficit present.      Mental Status: She is alert and oriented to person, place, and time.   Psychiatric:         Mood and Affect: Mood normal.         Behavior: Behavior normal.           Patient's questions answered and concerns addressed. Patient agrees to plan of care.    Patient should call the office

## 2025-02-03 RX ORDER — CELECOXIB 200 MG/1
200 CAPSULE ORAL 2 TIMES DAILY
Qty: 60 CAPSULE | Refills: 1 | Status: SHIPPED | OUTPATIENT
Start: 2025-02-03

## 2025-02-13 ENCOUNTER — OFFICE VISIT (OUTPATIENT)
Dept: FAMILY MEDICINE CLINIC | Age: 45
End: 2025-02-13
Payer: COMMERCIAL

## 2025-02-13 VITALS
WEIGHT: 275 LBS | OXYGEN SATURATION: 100 % | HEART RATE: 89 BPM | BODY MASS INDEX: 45.76 KG/M2 | DIASTOLIC BLOOD PRESSURE: 80 MMHG | SYSTOLIC BLOOD PRESSURE: 120 MMHG

## 2025-02-13 DIAGNOSIS — F31.9 BIPOLAR DISEASE, CHRONIC (HCC): ICD-10-CM

## 2025-02-13 DIAGNOSIS — E66.813 CLASS 3 SEVERE OBESITY DUE TO EXCESS CALORIES WITH SERIOUS COMORBIDITY AND BODY MASS INDEX (BMI) OF 45.0 TO 49.9 IN ADULT: ICD-10-CM

## 2025-02-13 DIAGNOSIS — G80.8 OTHER CEREBRAL PALSY (HCC): ICD-10-CM

## 2025-02-13 DIAGNOSIS — E66.01 CLASS 3 SEVERE OBESITY DUE TO EXCESS CALORIES WITH SERIOUS COMORBIDITY AND BODY MASS INDEX (BMI) OF 45.0 TO 49.9 IN ADULT: ICD-10-CM

## 2025-02-13 PROCEDURE — G8427 DOCREV CUR MEDS BY ELIG CLIN: HCPCS | Performed by: NURSE PRACTITIONER

## 2025-02-13 PROCEDURE — 99213 OFFICE O/P EST LOW 20 MIN: CPT | Performed by: NURSE PRACTITIONER

## 2025-02-13 PROCEDURE — 3074F SYST BP LT 130 MM HG: CPT | Performed by: NURSE PRACTITIONER

## 2025-02-13 PROCEDURE — G8417 CALC BMI ABV UP PARAM F/U: HCPCS | Performed by: NURSE PRACTITIONER

## 2025-02-13 PROCEDURE — 1036F TOBACCO NON-USER: CPT | Performed by: NURSE PRACTITIONER

## 2025-02-13 PROCEDURE — 3079F DIAST BP 80-89 MM HG: CPT | Performed by: NURSE PRACTITIONER

## 2025-02-13 RX ORDER — PHENTERMINE HYDROCHLORIDE 37.5 MG/1
37.5 CAPSULE ORAL EVERY MORNING
Qty: 30 CAPSULE | Refills: 0 | Status: SHIPPED | OUTPATIENT
Start: 2025-02-13 | End: 2025-03-15

## 2025-02-13 NOTE — PROGRESS NOTES
2/13/2025    Chief Complaint   Patient presents with    Obesity     4 wk fu     Weight Management    Alopecia     She would like to discuss a med for hair loss called  minoxidil       Blaire Hamm is a 45 y.o. female, presents today for:      ASSESSMENT/PLAN:  1. Class 3 severe obesity due to excess calories with serious comorbidity and body mass index (BMI) of 45.0 to 49.9 in adult  Discussed normal findings of weight loss including hair loss.  Discussed multivitamin for hair loss while on medication. Encouraged to monitor her hair loss for now.  Pt verbalized understanding  Continue Phentermine for M#3.  12 lbs weight loss since last OV.  Increase to 37.5 mg  5% weight loss goal 15 lb by 2/2024 or 281  WC 57 in--> 55 in--> 53.5 in  Encouraged diet/ exercise changes  Negative POCT UPT  - phentermine 37.5 MG capsule; Take 1 capsule by mouth every morning for 30 days. Max Daily Amount: 37.5 mg  Dispense: 30 capsule; Refill: 0    2. Bipolar disease, chronic (HCC)  Symptomatic but controlled  Continue current medications  Encouraged to seek support from family and friends    3. Other cerebral palsy (HCC)  Mild improvement in pain with weight loss    Return in about 3 months (around 5/13/2025).    Presenting today for obesity.  9 lb weight loss since last OV.  Tolerating Phentermine 37.5 mg.  No side effects noted. Attempting monitoring food intake with no improvement.  Trying to watch salt intake but struggling.  No consistent structured exercise.  Limited due to cerebral palsy and persistent knee pain.  Now taking a break from school.  Raising her two boys.  Reports intermittent MCMANUS with activity.  No CP, N/V, headaches.  No prior Cardiac hx.  No known family history         1/15/2025    11:24 AM 12/18/2024    11:51 AM 11/20/2024     3:53 PM 10/9/2024     3:53 PM 8/5/2024     1:57 PM 4/11/2024     9:43 AM 1/8/2024     2:26 PM   PHQ Scores   PHQ2 Score 6 2 2 2  0 2   PHQ9 Score 16 3 2 4 0 0 2     Interpretation of

## 2025-02-14 ENCOUNTER — TELEPHONE (OUTPATIENT)
Dept: ORTHOPEDIC SURGERY | Age: 45
End: 2025-02-14

## 2025-02-14 ENCOUNTER — OFFICE VISIT (OUTPATIENT)
Dept: ORTHOPEDIC SURGERY | Age: 45
End: 2025-02-14

## 2025-02-14 VITALS — HEIGHT: 65 IN | BODY MASS INDEX: 45.82 KG/M2 | WEIGHT: 275 LBS

## 2025-02-14 DIAGNOSIS — M17.11 PRIMARY LOCALIZED OSTEOARTHRITIS OF RIGHT KNEE: Primary | ICD-10-CM

## 2025-02-14 NOTE — TELEPHONE ENCOUNTER
Medication Name:  PAIN MEDS   Pharmacy: [facility] [address]  [phone]  Patient Contact Number:  [phone]      Spoke to her about this message she didn't want pain she wants something to help her sleep at night until her surgery. TKA.

## 2025-02-14 NOTE — TELEPHONE ENCOUNTER
Prescription Refill     Medication Name:  PAIN MEDS   Pharmacy: [facility] [address] [facility] [phone]  Patient Contact Number:  [phone]     PAIN MEDS TO HELP HER SLEEP AT NIGHT...

## 2025-02-17 DIAGNOSIS — J02.9 SORE THROAT: ICD-10-CM

## 2025-02-17 RX ORDER — PSEUDOEPHED/ACETAMINOPH/DIPHEN 30MG-500MG
TABLET ORAL
Qty: 60 TABLET | Refills: 5 | Status: SHIPPED | OUTPATIENT
Start: 2025-02-17

## 2025-02-18 DIAGNOSIS — M17.11 PRIMARY LOCALIZED OSTEOARTHRITIS OF RIGHT KNEE: Primary | ICD-10-CM

## 2025-02-18 NOTE — TELEPHONE ENCOUNTER
Prescription Refill     Medication Name:  LIDOCAINE PATCHES & KNEE CREAM  Pharmacy: [facility] [address] [place] [address] [address] - P [phone] - F [phone]   Patient Contact Number:  [phone]

## 2025-02-19 ENCOUNTER — PREP FOR PROCEDURE (OUTPATIENT)
Dept: ORTHOPEDIC SURGERY | Age: 45
End: 2025-02-19

## 2025-02-19 DIAGNOSIS — G89.29 CHRONIC PAIN OF RIGHT KNEE: Primary | ICD-10-CM

## 2025-02-19 DIAGNOSIS — M17.11 PRIMARY LOCALIZED OSTEOARTHRITIS OF RIGHT KNEE: ICD-10-CM

## 2025-02-19 DIAGNOSIS — M25.561 CHRONIC PAIN OF RIGHT KNEE: Primary | ICD-10-CM

## 2025-02-19 RX ORDER — LIDOCAINE HYDROCHLORIDE 10 MG/ML
4 INJECTION, SOLUTION INFILTRATION; PERINEURAL ONCE
Status: SHIPPED | OUTPATIENT
Start: 2025-02-19

## 2025-02-19 RX ORDER — TRIAMCINOLONE ACETONIDE 40 MG/ML
40 INJECTION, SUSPENSION INTRA-ARTICULAR; INTRAMUSCULAR ONCE
Status: SHIPPED | OUTPATIENT
Start: 2025-02-19

## 2025-02-19 RX ORDER — METHOCARBAMOL 750 MG/1
750 TABLET, FILM COATED ORAL 3 TIMES DAILY
Qty: 21 TABLET | Refills: 0 | Status: SHIPPED | OUTPATIENT
Start: 2025-02-19 | End: 2025-02-26

## 2025-02-19 RX ORDER — LIDOCAINE 4 G/G
1 PATCH TOPICAL DAILY
Qty: 30 PATCH | Refills: 0 | Status: SHIPPED | OUTPATIENT
Start: 2025-02-19 | End: 2025-03-21

## 2025-02-19 NOTE — PROGRESS NOTES
Dr Gt Diaz      Date /Time 2/14/2025       12:44 PM EST  Name Blaire Hamm             1980   Location  [facility] DR ORTHOPEDIC SURG  MRN [id number]                Chief Complaint   Patient presents with    Knee Pain     F/u right knee        History of Present Illness  Blaire Hamm is a 45 y.o. female who presents with worsening right knee pain.  She is an established patient of mine for treatment of bilateral knee issues which have been in large part related to knee arthritis in addition to muscle tightness related to prior diagnosis cerebral palsy.  We have been managing her knee arthritis with steroid injections, NSAIDs and physical therapy over time.  She is also been working with PMNR and had modalities such as Botox injections with to help with muscle tightness.  Despite these issues she is gone on to have worsening right knee pain which is the main factor interfering with her activities of daily living and causing uncontrolled pain at this point.  She does have a history of a well-functioning left knee replacement and interested in discussing right knee replacement at this time.        Past History  Past Medical History:   Diagnosis Date    Bipolar disorder (HCC)     Cerebral palsy (HCC)     legs;mainly right    Depression     Hypertension     PONV (postoperative nausea and vomiting)      Past Surgical History:   Procedure Laterality Date    FOOT SURGERY      KNEE SURGERY Left 6/12/15    LEG SURGERY      TOTAL KNEE ARTHROPLASTY Left 3/15/2023    LEFT TOTAL KNEE ARTHROPLASTY           RONEL BIOMET NOTE: ADDUCTOR CANAL BLOCK, IPAC performed by Gt Diaz MD at [facility] OR     Social History     Tobacco Use    Smoking status: Never    Smokeless tobacco: Never   Substance Use Topics    Alcohol use: No     Alcohol/week: 0.0 standard drinks of alcohol      Current Outpatient Medications on File Prior to Visit   Medication Sig Dispense Refill    phentermine 37.5 MG capsule Take 1 capsule by

## 2025-02-20 NOTE — PROGRESS NOTES
Blaire D Hansel    Age 45 y.o.    female    1980    MRN [id number]    3/13/2025  Arrival Time_____________  OR Time____________127 Min     Procedure(s):  RIGHT TOTAL KNEE ARTHROPLASTY                 RONEL NOTE:   ADDUCTOR CANAL BLOCK                      General   Surgeon(s):  Gt Diaz, MD      DAY ADMIT ___  SDS/OP ___  OUTPT IN BED ___        Phone [phone] (home)                  PCP _____________________ Phone_________________ Epic ( ) Epic CE ( ) Appt ________    NOTES: _________________________________________ Consult/Cardio _______________    ____________________________________________________________________________    ____________________________________________________________________________  PAT APPT DATE:________ TIME: ________  FAXED QAD: _______  (__) H&P w/ Hospitalist    (__) PAT orders in EPIC    (__) Meet with PAT nurse  __________________________________________________________________________  Preop Nurse phone screen complete: _____________  (__) CBC     (__) W/ DIFF ___________  (__) CT CHEST  __________   (__) Hgb A1C    ___________  (__) CHEST X RAY   __________  (__) LIPID PROFILE  ___________  (__) EKG   __________  (__) PT-INR / APTT  ___________  (__) PFT's   __________  (__) BMP   ___________  (__) CAROTIDS  __________  (__) CMP   ___________  (__) VEIN MAPPING  __________  (__) U/A   ___________  ( X ) HISTORY & PHYSICAL __________  (__) URINE C & S  ___________  (__) CARDIAC CLEARANCE __________  (__) U/A W/ FLEX  ___________  (__) PULM. CLEARANCE __________  (__) SERUM PREGNANCY ___________  (__) Preop Orders in EPIC __________  (__) TYPE & SCREEN __________repeat ( ) (__)  __________________ __________  (__) Albumin   ___________  (__)  __________________ __________  (__) TRANSFERRIN  ___________  (__)  __________________ __________  (__) LIVER PROFILE  ___________  (__) URINE PREG DOS __________  (__) MRSA NASAL SWAB ___________  (__) BLOOD SUGAR  DOS __________  (__) SED RATE  ___________  (__) OAC  _________________________  (__) C-REACTIVE PROTEIN ___________    (__) VITAMIN D HYDROXY ___________  (__) BETABLOCKER  _________________                                                                                       (__) ACE/ARBS:__________________________    (__) GLP-1 Agonist ___________________                Ride home/Contact #_______________________   (__) SCLT2 inhibitor ___________________

## 2025-02-26 DIAGNOSIS — I10 ESSENTIAL HYPERTENSION: ICD-10-CM

## 2025-02-26 NOTE — TELEPHONE ENCOUNTER
Refill Request     CONFIRM preferrred pharmacy with the patient.    If Mail Order Rx - Pend for 90 day refill.      Last Seen: Last Seen Department: 2/13/2025  Last Seen by PCP: 2/13/2025    Last Written: 09/06/2024    If no future appointment scheduled, route STAFF MESSAGE with patient name to the  Pool for scheduling.      Next Appointment:   No future appointments.    Message sent to  to schedule appt with patient?  NO      Requested Prescriptions     Pending Prescriptions Disp Refills    metoprolol succinate (TOPROL XL) 100 MG extended release tablet [Pharmacy Med Name: METOPROLOL SUCCINATE ER 100MG ER TABLET ER 24HR] 28 tablet 5     Sig: TAKE ONE TABLET BY MOUTH EVERY DAY

## 2025-02-27 ENCOUNTER — TELEPHONE (OUTPATIENT)
Dept: FAMILY MEDICINE CLINIC | Age: 45
End: 2025-02-27

## 2025-02-27 ENCOUNTER — TELEPHONE (OUTPATIENT)
Dept: ORTHOPEDIC SURGERY | Age: 45
End: 2025-02-27

## 2025-02-27 RX ORDER — METOPROLOL SUCCINATE 100 MG/1
TABLET, EXTENDED RELEASE ORAL
Qty: 28 TABLET | Refills: 5 | Status: SHIPPED | OUTPATIENT
Start: 2025-02-27

## 2025-02-27 NOTE — TELEPHONE ENCOUNTER
----- Message from Gerry AQUINO sent at 2/27/2025 10:34 AM EST -----  ECC Appointment Request    Patient needs appointment for ECC Appointment Type: Pre-Op Visit.    Patient Requested Dates(s): as soon as possible  Patient Requested Time:as soon as possible    Provider Name: Mallory Sapp APRN - CNP      Reason for Appointment Request: Established Patient - Available appointments did not meet patient need. march 13,2025+ knee replacement    --------------------------------------------------------------------------------------------------------------------------    Relationship to Patient: Self     Call Back Information: OK to leave message on voicemail  Preferred Call Back Number: [phone]

## 2025-03-03 ENCOUNTER — TELEPHONE (OUTPATIENT)
Dept: ORTHOPEDIC SURGERY | Age: 45
End: 2025-03-03

## 2025-03-03 ENCOUNTER — OFFICE VISIT (OUTPATIENT)
Dept: FAMILY MEDICINE CLINIC | Age: 45
End: 2025-03-03
Payer: COMMERCIAL

## 2025-03-03 VITALS
OXYGEN SATURATION: 97 % | SYSTOLIC BLOOD PRESSURE: 118 MMHG | BODY MASS INDEX: 45.6 KG/M2 | HEART RATE: 98 BPM | WEIGHT: 274 LBS | TEMPERATURE: 97.2 F | DIASTOLIC BLOOD PRESSURE: 87 MMHG

## 2025-03-03 DIAGNOSIS — R11.2 POST-OPERATIVE NAUSEA AND VOMITING: ICD-10-CM

## 2025-03-03 DIAGNOSIS — F33.1 MDD (MAJOR DEPRESSIVE DISORDER), RECURRENT EPISODE, MODERATE (HCC): Chronic | ICD-10-CM

## 2025-03-03 DIAGNOSIS — E66.01 MORBID OBESITY WITH BMI OF 45.0-49.9, ADULT: ICD-10-CM

## 2025-03-03 DIAGNOSIS — G80.8 OTHER CEREBRAL PALSY (HCC): ICD-10-CM

## 2025-03-03 DIAGNOSIS — Z98.890 POST-OPERATIVE NAUSEA AND VOMITING: ICD-10-CM

## 2025-03-03 DIAGNOSIS — M25.561 ARTHRALGIA OF RIGHT LOWER LEG: ICD-10-CM

## 2025-03-03 DIAGNOSIS — M17.12 PRIMARY OSTEOARTHRITIS OF LEFT KNEE: ICD-10-CM

## 2025-03-03 DIAGNOSIS — F43.10 PTSD (POST-TRAUMATIC STRESS DISORDER): Chronic | ICD-10-CM

## 2025-03-03 DIAGNOSIS — I10 PRIMARY HYPERTENSION: ICD-10-CM

## 2025-03-03 DIAGNOSIS — Z01.818 PREOP EXAM FOR INTERNAL MEDICINE: Primary | ICD-10-CM

## 2025-03-03 PROCEDURE — G8427 DOCREV CUR MEDS BY ELIG CLIN: HCPCS | Performed by: NURSE PRACTITIONER

## 2025-03-03 PROCEDURE — 1036F TOBACCO NON-USER: CPT | Performed by: NURSE PRACTITIONER

## 2025-03-03 PROCEDURE — G8417 CALC BMI ABV UP PARAM F/U: HCPCS | Performed by: NURSE PRACTITIONER

## 2025-03-03 PROCEDURE — 99213 OFFICE O/P EST LOW 20 MIN: CPT | Performed by: NURSE PRACTITIONER

## 2025-03-03 PROCEDURE — 3079F DIAST BP 80-89 MM HG: CPT | Performed by: NURSE PRACTITIONER

## 2025-03-03 PROCEDURE — 3074F SYST BP LT 130 MM HG: CPT | Performed by: NURSE PRACTITIONER

## 2025-03-03 ASSESSMENT — ENCOUNTER SYMPTOMS
COUGH: 0
CONSTIPATION: 0
CHEST TIGHTNESS: 0
SHORTNESS OF BREATH: 0
BLOOD IN STOOL: 0
DIARRHEA: 0

## 2025-03-03 NOTE — PROGRESS NOTES
Blaire Hamm  YOB: 1980    Date of Service:  3/3/2025      Wt Readings from Last 2 Encounters:   03/03/25 124.3 kg (274 lb)   02/14/25 124.7 kg (275 lb)       BP Readings from Last 3 Encounters:   03/03/25 118/87   02/13/25 120/80   01/15/25 128/82        Chief Complaint   Patient presents with    Pre-op Exam     Right knee replacement at [facility] 3/13/25 by Dr Diaz        Allergies   Allergen Reactions    Sulfa Antibiotics     Amoxicillin Rash    Clavulanic Acid Rash    Hydrocodone-Acetaminophen Nausea And Vomiting    Ultram [Tramadol] Nausea And Vomiting    Vicodin [Hydrocodone-Acetaminophen] Nausea And Vomiting       Outpatient Medications Marked as Taking for the 3/3/25 encounter (Office Visit) with Mallory Sapp APRN - CNP   Medication Sig Dispense Refill    metoprolol succinate (TOPROL XL) 100 MG extended release tablet TAKE ONE TABLET BY MOUTH EVERY DAY 28 tablet 5    lidocaine 4 % external patch Place 1 patch onto the skin daily 30 patch 0    Acetaminophen Extra Strength 500 MG TABS TAKE ONE (1) TABLET BY MOUTH EVERY SIX (6) HOURS AS NEEDED FOR PAIN OR FEVER 60 tablet 5    phentermine 37.5 MG capsule Take 1 capsule by mouth every morning for 30 days. Max Daily Amount: 37.5 mg 30 capsule 0    pantoprazole (PROTONIX) 40 MG tablet TAKE ONE (1) TABLET BY MOUTH EVERY MORNING (BEFORE BREAKFAST) 28 tablet 5    busPIRone (BUSPAR) 10 MG tablet Take 2 tablets by mouth at bedtime      carbidopa-levodopa (SINEMET)  MG per tablet Take 1 tablet by mouth in the morning, at noon, and at bedtime      clonazePAM (KLONOPIN) 0.5 MG tablet Take 1 tablet as needed for tremor daily 30 tablet 2    VRAYLAR 4.5 MG CAPS capsule       lamoTRIgine (LAMICTAL) 200 MG tablet Take 1 tablet by mouth nightly      escitalopram (LEXAPRO) 20 MG tablet TAKE ONE TABLET BY MOUTH DAILY 30 tablet 3       This patient presents to the office todayfor a preoperative consultation at the request of surgeon, Gt Smallwood

## 2025-03-03 NOTE — TELEPHONE ENCOUNTER
Discharge Disposition Information:     Attended Pre-Hab Program: no     Anticipated Discharge Disposition:  Home with OP PT - cady    Who will be with patient at home following discharge?  Mother to stay with pt      Equipment pt already has:  NEED Walker    Bedroom on first or second floor: first   Bathroom on first or second floor: first   Weight bearing status: full   Pre-op ambulatory status: none   Number of entry steps: zero    Caregiver assistance: full time    Pt plan to DC same day: ok to    [facility] preference: JORGE WISEMAN RN  3/3/2025

## 2025-03-05 NOTE — PROGRESS NOTES
Surgery Date and Time: 3/13/25 12:30 pm    Arrival Time: 10:30 am       The instructions given when a patient needs to stop oral intake prior to surgery varies. Follow the instructions you were     given by your Surgeon or RN during the Pre-op call.      __X__Do not eat or drink anything after Midnight the night before the surgery. NO gum, mints, candy or ice chips the day of surgery.        ___X___ Carbo-loading or ENHANCED RECOVERY instructions will be given to select patients.  Please do the following:    The evening before your surgery (after dinner before midnight) drink 40 ounces (2 - 20 ounce bottles) of Gatorade.      If you are diabetic use sugar free.       Only take the following medications with a small sip of water the morning of surgery: metoprolol, carbidopa-levodopa, and pantoprazole      Hold the following medications (per anesthesia or surgeon request): phentermine     Last Dose: 3/4/25 per patient; medication to be held for one week prior to surgery        Aspirin, Ibuprofen, Advil, Naproxen, Vitamin E and other Anti-inflammatory products and supplements should be stopped       for 5-7days before surgery or as directed by your physician/surgeon.        Check with your Surgeon/PCP/Cardiologist regarding stopping your Blood Thinner & follow their instructions:        Medication:  N/A                Last dose:                - If you take a long acting-insulin, take your normal dose the night before surgery & half the dose if taken in the morning.     - Do not smoke or vape, and do not drink any alcoholic beverages 24 hours prior to surgery, this includes NA Beer. Refrain from using     any recreational drugs, including non-prescribed prescription drugs.     -You may brush your teeth and gargle the morning of surgery.  DO NOT SWALLOW WATER.    -You MUST plan for a responsible adult to stay on site while you are here and take you home after your surgery. You will not be allowed                to

## 2025-03-07 ENCOUNTER — ANESTHESIA EVENT (OUTPATIENT)
Dept: OPERATING ROOM | Age: 45
End: 2025-03-07
Payer: COMMERCIAL

## 2025-03-09 ENCOUNTER — HOSPITAL ENCOUNTER (OUTPATIENT)
Age: 45
Discharge: HOME | End: 2025-03-09
Payer: COMMERCIAL

## 2025-03-09 DIAGNOSIS — F33.1 MDD (MAJOR DEPRESSIVE DISORDER), RECURRENT EPISODE, MODERATE (HCC): Chronic | ICD-10-CM

## 2025-03-09 DIAGNOSIS — Z01.818 PREOP EXAM FOR INTERNAL MEDICINE: ICD-10-CM

## 2025-03-09 DIAGNOSIS — M25.561 ARTHRALGIA OF RIGHT LOWER LEG: ICD-10-CM

## 2025-03-09 DIAGNOSIS — E66.01 MORBID OBESITY WITH BMI OF 45.0-49.9, ADULT: ICD-10-CM

## 2025-03-09 DIAGNOSIS — F43.10 PTSD (POST-TRAUMATIC STRESS DISORDER): Chronic | ICD-10-CM

## 2025-03-09 DIAGNOSIS — I10 PRIMARY HYPERTENSION: ICD-10-CM

## 2025-03-09 DIAGNOSIS — G80.8 OTHER CEREBRAL PALSY (HCC): ICD-10-CM

## 2025-03-09 LAB
ALBUMIN SERPL-MCNC: 4.4 G/DL (ref 3.4–5)
ALBUMIN/GLOB SERPL: 1.2 {RATIO} (ref 1.1–2.2)
ALP SERPL-CCNC: 95 U/L (ref 40–129)
ALT SERPL-CCNC: 19 U/L (ref 10–40)
ANION GAP SERPL CALCULATED.3IONS-SCNC: 15 MMOL/L (ref 3–16)
APTT BLD: 25.6 SEC (ref 22.1–36.4)
AST SERPL-CCNC: 18 U/L (ref 15–37)
BACTERIA URNS QL MICRO: ABNORMAL /HPF
BASOPHILS # BLD: 0.1 K/UL (ref 0–0.2)
BASOPHILS NFR BLD: 0.9 %
BILIRUB SERPL-MCNC: <0.2 MG/DL (ref 0–1)
BILIRUB UR QL STRIP.AUTO: NEGATIVE
BUN SERPL-MCNC: 10 MG/DL (ref 7–20)
CALCIUM SERPL-MCNC: 9.7 MG/DL (ref 8.3–10.6)
CHLORIDE SERPL-SCNC: 100 MMOL/L (ref 99–110)
CLARITY UR: CLEAR
CO2 SERPL-SCNC: 25 MMOL/L (ref 21–32)
COLOR UR: YELLOW
CREAT SERPL-MCNC: 0.9 MG/DL (ref 0.6–1.1)
DEPRECATED RDW RBC AUTO: 17.2 % (ref 12.4–15.4)
EOSINOPHIL # BLD: 0.3 K/UL (ref 0–0.6)
EOSINOPHIL NFR BLD: 3.2 %
EPI CELLS #/AREA URNS HPF: ABNORMAL /HPF (ref 0–5)
ERYTHROCYTE [SEDIMENTATION RATE] IN BLOOD BY WESTERGREN METHOD: 36 MM/HR (ref 0–20)
GFR SERPLBLD CREATININE-BSD FMLA CKD-EPI: 80 ML/MIN/{1.73_M2}
GLUCOSE SERPL-MCNC: 107 MG/DL (ref 70–99)
GLUCOSE UR STRIP.AUTO-MCNC: NEGATIVE MG/DL
HCT VFR BLD AUTO: 46.5 % (ref 36–48)
HGB BLD-MCNC: 14.7 G/DL (ref 12–16)
HGB UR QL STRIP.AUTO: NEGATIVE
INR PPP: 0.92 (ref 0.85–1.15)
KETONES UR STRIP.AUTO-MCNC: NEGATIVE MG/DL
LEUKOCYTE ESTERASE UR QL STRIP.AUTO: NEGATIVE
LYMPHOCYTES # BLD: 2.4 K/UL (ref 1–5.1)
LYMPHOCYTES NFR BLD: 26.5 %
MCH RBC QN AUTO: 25.6 PG (ref 26–34)
MCHC RBC AUTO-ENTMCNC: 31.6 G/DL (ref 31–36)
MCV RBC AUTO: 80.9 FL (ref 80–100)
MONOCYTES # BLD: 0.7 K/UL (ref 0–1.3)
MONOCYTES NFR BLD: 7.5 %
MUCOUS THREADS #/AREA URNS LPF: ABNORMAL /LPF
NEUTROPHILS # BLD: 5.5 K/UL (ref 1.7–7.7)
NEUTROPHILS NFR BLD: 61.9 %
NITRITE UR QL STRIP.AUTO: NEGATIVE
PH UR STRIP.AUTO: 6 [PH] (ref 5–8)
PLATELET # BLD AUTO: 420 K/UL (ref 135–450)
PMV BLD AUTO: 7.5 FL (ref 5–10.5)
POTASSIUM SERPL-SCNC: 3.6 MMOL/L (ref 3.5–5.1)
PROT SERPL-MCNC: 8.1 G/DL (ref 6.4–8.2)
PROT UR STRIP.AUTO-MCNC: NEGATIVE MG/DL
PROTHROMBIN TIME: 12.6 SEC (ref 11.9–14.9)
RBC # BLD AUTO: 5.75 M/UL (ref 4–5.2)
RBC #/AREA URNS HPF: ABNORMAL /HPF (ref 0–4)
SODIUM SERPL-SCNC: 140 MMOL/L (ref 136–145)
SP GR UR STRIP.AUTO: <=1.005 (ref 1–1.03)
UA DIPSTICK W REFLEX MICRO PNL UR: ABNORMAL
URN SPEC COLLECT METH UR: ABNORMAL
UROBILINOGEN UR STRIP-ACNC: 0.2 E.U./DL
WBC # BLD AUTO: 8.9 K/UL (ref 4–11)
WBC #/AREA URNS HPF: ABNORMAL /HPF (ref 0–5)

## 2025-03-09 PROCEDURE — 83036 HEMOGLOBIN GLYCOSYLATED A1C: CPT

## 2025-03-09 PROCEDURE — 85610 PROTHROMBIN TIME: CPT

## 2025-03-09 PROCEDURE — 87081 CULTURE SCREEN ONLY: CPT

## 2025-03-09 PROCEDURE — 36415 COLL VENOUS BLD VENIPUNCTURE: CPT

## 2025-03-09 PROCEDURE — 85652 RBC SED RATE AUTOMATED: CPT

## 2025-03-09 PROCEDURE — 82306 VITAMIN D 25 HYDROXY: CPT

## 2025-03-09 PROCEDURE — 80053 COMPREHEN METABOLIC PANEL: CPT

## 2025-03-09 PROCEDURE — 84134 ASSAY OF PREALBUMIN: CPT

## 2025-03-09 PROCEDURE — 85025 COMPLETE CBC W/AUTO DIFF WBC: CPT

## 2025-03-09 PROCEDURE — 80061 LIPID PANEL: CPT

## 2025-03-09 PROCEDURE — 84466 ASSAY OF TRANSFERRIN: CPT

## 2025-03-09 PROCEDURE — 85730 THROMBOPLASTIN TIME PARTIAL: CPT

## 2025-03-09 PROCEDURE — 81001 URINALYSIS AUTO W/SCOPE: CPT

## 2025-03-09 PROCEDURE — 87086 URINE CULTURE/COLONY COUNT: CPT

## 2025-03-09 PROCEDURE — 86140 C-REACTIVE PROTEIN: CPT

## 2025-03-10 ENCOUNTER — TELEPHONE (OUTPATIENT)
Dept: FAMILY MEDICINE CLINIC | Age: 45
End: 2025-03-10

## 2025-03-10 ENCOUNTER — RESULTS FOLLOW-UP (OUTPATIENT)
Dept: FAMILY MEDICINE CLINIC | Age: 45
End: 2025-03-10

## 2025-03-10 DIAGNOSIS — E55.9 VITAMIN D DEFICIENCY: Primary | ICD-10-CM

## 2025-03-10 LAB
25(OH)D3 SERPL-MCNC: 14.8 NG/ML
CHOLEST SERPL-MCNC: 189 MG/DL (ref 0–199)
CRP SERPL-MCNC: 16.6 MG/L (ref 0–5.1)
EST. AVERAGE GLUCOSE BLD GHB EST-MCNC: 111.2 MG/DL
HBA1C MFR BLD: 5.5 %
HDLC SERPL-MCNC: 51 MG/DL (ref 40–60)
LDLC SERPL CALC-MCNC: 119 MG/DL
PREALB SERPL-MCNC: 22.5 MG/DL (ref 20–40)
TRANSFERRIN SERPL-MCNC: 268 MG/DL (ref 200–360)
TRIGL SERPL-MCNC: 96 MG/DL (ref 0–150)
VLDLC SERPL CALC-MCNC: 19 MG/DL

## 2025-03-10 RX ORDER — ERGOCALCIFEROL 1.25 MG/1
50000 CAPSULE, LIQUID FILLED ORAL WEEKLY
Qty: 4 CAPSULE | Refills: 0 | Status: SHIPPED | OUTPATIENT
Start: 2025-03-10

## 2025-03-10 NOTE — RESULT ENCOUNTER NOTE
No UTI    No anemia/ infection.      Normal kidney/ liver function.     Cholesterol slightly more elevated but no need to change medications.    No diabetes    Vitamin D level is low- I have sent a weekly supplement to [facility] for you.

## 2025-03-10 NOTE — TELEPHONE ENCOUNTER
Patient is having total knee replacement on Thursday. She wanted to know if you could write a letter for her saying that she will be unable to work due to the surgery, this is for her food stamps. You can fax to her  at Fx#[phone] It must have this case number on it. Case#[id number]

## 2025-03-11 LAB — BACTERIA UR CULT: NORMAL

## 2025-03-11 NOTE — TELEPHONE ENCOUNTER
So she really needs a general letter so she can get food stamps and not work due to her medical conditions?  Is there a time frame needed for this letter?  Like lasting for several months, a year, etc?

## 2025-03-12 LAB — MRSA SPEC QL CULT: NORMAL

## 2025-03-12 NOTE — H&P
Update History & Physical    The patient's History and Physical of March 3, 2025 was reviewed with the patient and I examined the patient. There was no change. The surgical site was confirmed by the patient and me.     Also see my physical exam on 2/14    Plan: The risks, benefits, expected outcome, and alternative to the recommended procedure have been discussed with the patient. Patient understands and wants to proceed with the procedure.     Electronically signed by Gt Diaz MD on 3/12/2025 at 4:15 PM

## 2025-03-13 ENCOUNTER — HOSPITAL ENCOUNTER (OUTPATIENT)
Age: 45
Setting detail: OBSERVATION
Discharge: HOME | End: 2025-03-14
Attending: STUDENT IN AN ORGANIZED HEALTH CARE EDUCATION/TRAINING PROGRAM | Admitting: STUDENT IN AN ORGANIZED HEALTH CARE EDUCATION/TRAINING PROGRAM
Payer: COMMERCIAL

## 2025-03-13 ENCOUNTER — APPOINTMENT (OUTPATIENT)
Dept: GENERAL RADIOLOGY | Age: 45
End: 2025-03-13
Attending: STUDENT IN AN ORGANIZED HEALTH CARE EDUCATION/TRAINING PROGRAM
Payer: COMMERCIAL

## 2025-03-13 ENCOUNTER — ANESTHESIA (OUTPATIENT)
Dept: OPERATING ROOM | Age: 45
End: 2025-03-13
Payer: COMMERCIAL

## 2025-03-13 DIAGNOSIS — J02.9 SORE THROAT: ICD-10-CM

## 2025-03-13 DIAGNOSIS — Z96.651 S/P TOTAL KNEE ARTHROPLASTY, RIGHT: ICD-10-CM

## 2025-03-13 DIAGNOSIS — Z96.652 S/P TOTAL KNEE ARTHROPLASTY, LEFT: Primary | ICD-10-CM

## 2025-03-13 LAB
GLUCOSE BLD-MCNC: 123 MG/DL (ref 70–99)
GLUCOSE BLD-MCNC: 153 MG/DL (ref 70–99)
HCG UR QL: NEGATIVE
PERFORMED ON: ABNORMAL
PERFORMED ON: ABNORMAL

## 2025-03-13 PROCEDURE — 94761 N-INVAS EAR/PLS OXIMETRY MLT: CPT

## 2025-03-13 PROCEDURE — 6360000002 HC RX W HCPCS: Performed by: ANESTHESIOLOGY

## 2025-03-13 PROCEDURE — C1713 ANCHOR/SCREW BN/BN,TIS/BN: HCPCS | Performed by: STUDENT IN AN ORGANIZED HEALTH CARE EDUCATION/TRAINING PROGRAM

## 2025-03-13 PROCEDURE — 2580000003 HC RX 258: Performed by: NURSE ANESTHETIST, CERTIFIED REGISTERED

## 2025-03-13 PROCEDURE — 2500000003 HC RX 250 WO HCPCS: Performed by: STUDENT IN AN ORGANIZED HEALTH CARE EDUCATION/TRAINING PROGRAM

## 2025-03-13 PROCEDURE — 7100000000 HC PACU RECOVERY - FIRST 15 MIN: Performed by: STUDENT IN AN ORGANIZED HEALTH CARE EDUCATION/TRAINING PROGRAM

## 2025-03-13 PROCEDURE — 6370000000 HC RX 637 (ALT 250 FOR IP): Performed by: NURSE ANESTHETIST, CERTIFIED REGISTERED

## 2025-03-13 PROCEDURE — 6360000002 HC RX W HCPCS: Performed by: NURSE ANESTHETIST, CERTIFIED REGISTERED

## 2025-03-13 PROCEDURE — 3600000005 HC SURGERY LEVEL 5 BASE: Performed by: STUDENT IN AN ORGANIZED HEALTH CARE EDUCATION/TRAINING PROGRAM

## 2025-03-13 PROCEDURE — 6360000002 HC RX W HCPCS: Performed by: STUDENT IN AN ORGANIZED HEALTH CARE EDUCATION/TRAINING PROGRAM

## 2025-03-13 PROCEDURE — G0378 HOSPITAL OBSERVATION PER HR: HCPCS

## 2025-03-13 PROCEDURE — 3600000015 HC SURGERY LEVEL 5 ADDTL 15MIN: Performed by: STUDENT IN AN ORGANIZED HEALTH CARE EDUCATION/TRAINING PROGRAM

## 2025-03-13 PROCEDURE — 73560 X-RAY EXAM OF KNEE 1 OR 2: CPT

## 2025-03-13 PROCEDURE — 2720000010 HC SURG SUPPLY STERILE: Performed by: STUDENT IN AN ORGANIZED HEALTH CARE EDUCATION/TRAINING PROGRAM

## 2025-03-13 PROCEDURE — 2580000003 HC RX 258: Performed by: STUDENT IN AN ORGANIZED HEALTH CARE EDUCATION/TRAINING PROGRAM

## 2025-03-13 PROCEDURE — 3700000001 HC ADD 15 MINUTES (ANESTHESIA): Performed by: STUDENT IN AN ORGANIZED HEALTH CARE EDUCATION/TRAINING PROGRAM

## 2025-03-13 PROCEDURE — 6370000000 HC RX 637 (ALT 250 FOR IP): Performed by: STUDENT IN AN ORGANIZED HEALTH CARE EDUCATION/TRAINING PROGRAM

## 2025-03-13 PROCEDURE — 84703 CHORIONIC GONADOTROPIN ASSAY: CPT

## 2025-03-13 PROCEDURE — 2500000003 HC RX 250 WO HCPCS: Performed by: NURSE ANESTHETIST, CERTIFIED REGISTERED

## 2025-03-13 PROCEDURE — 3700000000 HC ANESTHESIA ATTENDED CARE: Performed by: STUDENT IN AN ORGANIZED HEALTH CARE EDUCATION/TRAINING PROGRAM

## 2025-03-13 PROCEDURE — C1776 JOINT DEVICE (IMPLANTABLE): HCPCS | Performed by: STUDENT IN AN ORGANIZED HEALTH CARE EDUCATION/TRAINING PROGRAM

## 2025-03-13 PROCEDURE — 7100000001 HC PACU RECOVERY - ADDTL 15 MIN: Performed by: STUDENT IN AN ORGANIZED HEALTH CARE EDUCATION/TRAINING PROGRAM

## 2025-03-13 PROCEDURE — 2709999900 HC NON-CHARGEABLE SUPPLY: Performed by: STUDENT IN AN ORGANIZED HEALTH CARE EDUCATION/TRAINING PROGRAM

## 2025-03-13 PROCEDURE — 27447 TOTAL KNEE ARTHROPLASTY: CPT | Performed by: STUDENT IN AN ORGANIZED HEALTH CARE EDUCATION/TRAINING PROGRAM

## 2025-03-13 PROCEDURE — 2700000000 HC OXYGEN THERAPY PER DAY

## 2025-03-13 PROCEDURE — 6370000000 HC RX 637 (ALT 250 FOR IP): Performed by: ANESTHESIOLOGY

## 2025-03-13 DEVICE — IMPLANTABLE DEVICE
Type: IMPLANTABLE DEVICE | Site: KNEE | Status: FUNCTIONAL
Brand: PERSONA® THE PERSONALIZED KNEE® OSSEOTI®

## 2025-03-13 DEVICE — IMPLANTABLE DEVICE
Type: IMPLANTABLE DEVICE | Site: KNEE | Status: FUNCTIONAL
Brand: PERSONA® VIVACIT-E®

## 2025-03-13 DEVICE — IMPLANTABLE DEVICE
Type: IMPLANTABLE DEVICE | Site: KNEE | Status: FUNCTIONAL
Brand: PERSONA® PPS®

## 2025-03-13 DEVICE — PALACOS® R IS A FAST-CURING, RADIOPAQUE, POLY(METHYL METHACRYLATE)-BASED BONE CEMENT.PALACOS ® R CONTAINS THE X-RAY CONTRAST MEDIUM ZIRCONIUM DIOXIDE. TO IMPROVE VISIBILITY IN THE SURGICAL FIELD PALACOS ® R HAS BEEN COLOURED WITH CHLOROPHYLL (E141). THE BONE CEMENT IS PREPARED DIRECTLY BEFORE USE BY MIXING A POLYMER POWDER COMPONENT WITH A LIQUID MONOMER COMPONENT. A DUCTILE DOUGH FORMS WHICH CURES WITHIN A FEW MINUTES.
Type: IMPLANTABLE DEVICE | Site: KNEE | Status: FUNCTIONAL
Brand: PALACOS®

## 2025-03-13 RX ORDER — ALBUTEROL SULFATE 90 UG/1
INHALANT RESPIRATORY (INHALATION)
Status: DISCONTINUED | OUTPATIENT
Start: 2025-03-13 | End: 2025-03-13 | Stop reason: SDUPTHER

## 2025-03-13 RX ORDER — MAGNESIUM SULFATE IN WATER 40 MG/ML
2000 INJECTION, SOLUTION INTRAVENOUS ONCE
Status: COMPLETED | OUTPATIENT
Start: 2025-03-13 | End: 2025-03-13

## 2025-03-13 RX ORDER — SODIUM CHLORIDE 9 MG/ML
INJECTION, SOLUTION INTRAVENOUS
Status: DISCONTINUED | OUTPATIENT
Start: 2025-03-13 | End: 2025-03-13 | Stop reason: SDUPTHER

## 2025-03-13 RX ORDER — PHENTERMINE HYDROCHLORIDE 37.5 MG/1
37.5 CAPSULE ORAL EVERY MORNING
Status: DISCONTINUED | OUTPATIENT
Start: 2025-03-14 | End: 2025-03-13

## 2025-03-13 RX ORDER — CARBIDOPA AND LEVODOPA 25; 100 MG/1; MG/1
1 TABLET ORAL 3 TIMES DAILY
Status: DISCONTINUED | OUTPATIENT
Start: 2025-03-13 | End: 2025-03-14 | Stop reason: HOSPADM

## 2025-03-13 RX ORDER — NALOXONE HYDROCHLORIDE 0.4 MG/ML
INJECTION, SOLUTION INTRAMUSCULAR; INTRAVENOUS; SUBCUTANEOUS PRN
Status: DISCONTINUED | OUTPATIENT
Start: 2025-03-13 | End: 2025-03-13 | Stop reason: HOSPADM

## 2025-03-13 RX ORDER — OXYCODONE HYDROCHLORIDE 5 MG/1
5 TABLET ORAL EVERY 4 HOURS PRN
Status: DISCONTINUED | OUTPATIENT
Start: 2025-03-13 | End: 2025-03-14 | Stop reason: HOSPADM

## 2025-03-13 RX ORDER — DEXAMETHASONE SODIUM PHOSPHATE 4 MG/ML
INJECTION, SOLUTION INTRA-ARTICULAR; INTRALESIONAL; INTRAMUSCULAR; INTRAVENOUS; SOFT TISSUE
Status: DISCONTINUED | OUTPATIENT
Start: 2025-03-13 | End: 2025-03-13 | Stop reason: SDUPTHER

## 2025-03-13 RX ORDER — BISACODYL 5 MG/1
5 TABLET, DELAYED RELEASE ORAL DAILY
Status: DISCONTINUED | OUTPATIENT
Start: 2025-03-13 | End: 2025-03-14 | Stop reason: HOSPADM

## 2025-03-13 RX ORDER — LIDOCAINE HYDROCHLORIDE 10 MG/ML
1 INJECTION, SOLUTION EPIDURAL; INFILTRATION; INTRACAUDAL; PERINEURAL
Status: DISCONTINUED | OUTPATIENT
Start: 2025-03-13 | End: 2025-03-13 | Stop reason: HOSPADM

## 2025-03-13 RX ORDER — DIPHENHYDRAMINE HYDROCHLORIDE 50 MG/ML
6.25 INJECTION, SOLUTION INTRAMUSCULAR; INTRAVENOUS
Status: COMPLETED | OUTPATIENT
Start: 2025-03-13 | End: 2025-03-13

## 2025-03-13 RX ORDER — SODIUM CHLORIDE 0.9 % (FLUSH) 0.9 %
5-40 SYRINGE (ML) INJECTION PRN
Status: DISCONTINUED | OUTPATIENT
Start: 2025-03-13 | End: 2025-03-13 | Stop reason: HOSPADM

## 2025-03-13 RX ORDER — ACETAMINOPHEN 500 MG
1000 TABLET ORAL EVERY 8 HOURS SCHEDULED
Status: DISCONTINUED | OUTPATIENT
Start: 2025-03-13 | End: 2025-03-14 | Stop reason: HOSPADM

## 2025-03-13 RX ORDER — ENOXAPARIN SODIUM 100 MG/ML
40 INJECTION SUBCUTANEOUS DAILY
Status: DISCONTINUED | OUTPATIENT
Start: 2025-03-13 | End: 2025-03-13

## 2025-03-13 RX ORDER — ONDANSETRON 2 MG/ML
4 INJECTION INTRAMUSCULAR; INTRAVENOUS EVERY 6 HOURS PRN
Status: DISCONTINUED | OUTPATIENT
Start: 2025-03-13 | End: 2025-03-14 | Stop reason: HOSPADM

## 2025-03-13 RX ORDER — SODIUM CHLORIDE 9 MG/ML
INJECTION, SOLUTION INTRAVENOUS PRN
Status: DISCONTINUED | OUTPATIENT
Start: 2025-03-13 | End: 2025-03-13 | Stop reason: HOSPADM

## 2025-03-13 RX ORDER — BACLOFEN 10 MG/1
10 TABLET ORAL 3 TIMES DAILY
Qty: 90 TABLET | Refills: 2 | Status: SHIPPED | OUTPATIENT
Start: 2025-03-13 | End: 2025-03-14 | Stop reason: HOSPADM

## 2025-03-13 RX ORDER — ESCITALOPRAM OXALATE 10 MG/1
20 TABLET ORAL DAILY
Status: DISCONTINUED | OUTPATIENT
Start: 2025-03-13 | End: 2025-03-14 | Stop reason: HOSPADM

## 2025-03-13 RX ORDER — ONDANSETRON 4 MG/1
4 TABLET, ORALLY DISINTEGRATING ORAL EVERY 8 HOURS PRN
Status: DISCONTINUED | OUTPATIENT
Start: 2025-03-13 | End: 2025-03-14 | Stop reason: HOSPADM

## 2025-03-13 RX ORDER — HYDROMORPHONE HYDROCHLORIDE 2 MG/ML
INJECTION, SOLUTION INTRAMUSCULAR; INTRAVENOUS; SUBCUTANEOUS
Status: DISCONTINUED | OUTPATIENT
Start: 2025-03-13 | End: 2025-03-13 | Stop reason: SDUPTHER

## 2025-03-13 RX ORDER — OXYCODONE HYDROCHLORIDE 5 MG/1
10 TABLET ORAL EVERY 4 HOURS PRN
Status: DISCONTINUED | OUTPATIENT
Start: 2025-03-13 | End: 2025-03-14 | Stop reason: HOSPADM

## 2025-03-13 RX ORDER — PROPOFOL 10 MG/ML
INJECTION, EMULSION INTRAVENOUS
Status: DISCONTINUED | OUTPATIENT
Start: 2025-03-13 | End: 2025-03-13 | Stop reason: SDUPTHER

## 2025-03-13 RX ORDER — MORPHINE SULFATE 2 MG/ML
2 INJECTION, SOLUTION INTRAMUSCULAR; INTRAVENOUS
Status: DISCONTINUED | OUTPATIENT
Start: 2025-03-13 | End: 2025-03-14

## 2025-03-13 RX ORDER — DEXAMETHASONE SODIUM PHOSPHATE 10 MG/ML
8 INJECTION, SOLUTION INTRA-ARTICULAR; INTRALESIONAL; INTRAMUSCULAR; INTRAVENOUS; SOFT TISSUE EVERY 8 HOURS
Status: COMPLETED | OUTPATIENT
Start: 2025-03-13 | End: 2025-03-14

## 2025-03-13 RX ORDER — ONDANSETRON 2 MG/ML
4 INJECTION INTRAMUSCULAR; INTRAVENOUS ONCE
Status: DISCONTINUED | OUTPATIENT
Start: 2025-03-13 | End: 2025-03-13 | Stop reason: HOSPADM

## 2025-03-13 RX ORDER — MIDAZOLAM HYDROCHLORIDE 1 MG/ML
2 INJECTION, SOLUTION INTRAMUSCULAR; INTRAVENOUS
Status: DISCONTINUED | OUTPATIENT
Start: 2025-03-13 | End: 2025-03-13 | Stop reason: HOSPADM

## 2025-03-13 RX ORDER — SODIUM CHLORIDE 9 MG/ML
INJECTION, SOLUTION INTRAVENOUS PRN
Status: DISCONTINUED | OUTPATIENT
Start: 2025-03-13 | End: 2025-03-14 | Stop reason: HOSPADM

## 2025-03-13 RX ORDER — KETOROLAC TROMETHAMINE 30 MG/ML
INJECTION, SOLUTION INTRAMUSCULAR; INTRAVENOUS
Status: DISCONTINUED | OUTPATIENT
Start: 2025-03-13 | End: 2025-03-13 | Stop reason: SDUPTHER

## 2025-03-13 RX ORDER — SODIUM CHLORIDE 0.9 % (FLUSH) 0.9 %
5-40 SYRINGE (ML) INJECTION PRN
Status: DISCONTINUED | OUTPATIENT
Start: 2025-03-13 | End: 2025-03-14 | Stop reason: HOSPADM

## 2025-03-13 RX ORDER — MECLIZINE HCL 12.5 MG 12.5 MG/1
12.5 TABLET ORAL 3 TIMES DAILY PRN
Status: DISCONTINUED | OUTPATIENT
Start: 2025-03-13 | End: 2025-03-14 | Stop reason: HOSPADM

## 2025-03-13 RX ORDER — OXYCODONE HYDROCHLORIDE 5 MG/1
5 TABLET ORAL PRN
Status: DISCONTINUED | OUTPATIENT
Start: 2025-03-13 | End: 2025-03-13 | Stop reason: HOSPADM

## 2025-03-13 RX ORDER — VANCOMYCIN HYDROCHLORIDE 1 G/20ML
INJECTION, POWDER, LYOPHILIZED, FOR SOLUTION INTRAVENOUS PRN
Status: DISCONTINUED | OUTPATIENT
Start: 2025-03-13 | End: 2025-03-13 | Stop reason: ALTCHOICE

## 2025-03-13 RX ORDER — LIDOCAINE HYDROCHLORIDE 20 MG/ML
INJECTION, SOLUTION INFILTRATION; PERINEURAL
Status: DISCONTINUED | OUTPATIENT
Start: 2025-03-13 | End: 2025-03-13 | Stop reason: SDUPTHER

## 2025-03-13 RX ORDER — SODIUM CHLORIDE 0.9 % (FLUSH) 0.9 %
5-40 SYRINGE (ML) INJECTION PRN
Status: DISCONTINUED | OUTPATIENT
Start: 2025-03-13 | End: 2025-03-13 | Stop reason: SDUPTHER

## 2025-03-13 RX ORDER — MIDAZOLAM HYDROCHLORIDE 1 MG/ML
INJECTION, SOLUTION INTRAMUSCULAR; INTRAVENOUS
Status: DISCONTINUED | OUTPATIENT
Start: 2025-03-13 | End: 2025-03-13 | Stop reason: SDUPTHER

## 2025-03-13 RX ORDER — SODIUM CHLORIDE 0.9 % (FLUSH) 0.9 %
5-40 SYRINGE (ML) INJECTION EVERY 12 HOURS SCHEDULED
Status: DISCONTINUED | OUTPATIENT
Start: 2025-03-13 | End: 2025-03-13 | Stop reason: HOSPADM

## 2025-03-13 RX ORDER — FENTANYL CITRATE 50 UG/ML
INJECTION, SOLUTION INTRAMUSCULAR; INTRAVENOUS
Status: DISCONTINUED
Start: 2025-03-13 | End: 2025-03-13

## 2025-03-13 RX ORDER — MAGNESIUM HYDROXIDE 1200 MG/15ML
LIQUID ORAL CONTINUOUS PRN
Status: COMPLETED | OUTPATIENT
Start: 2025-03-13 | End: 2025-03-13

## 2025-03-13 RX ORDER — CELECOXIB 400 MG/1
400 CAPSULE ORAL ONCE
Status: COMPLETED | OUTPATIENT
Start: 2025-03-13 | End: 2025-03-13

## 2025-03-13 RX ORDER — SODIUM CHLORIDE 0.9 % (FLUSH) 0.9 %
5-40 SYRINGE (ML) INJECTION EVERY 12 HOURS SCHEDULED
Status: DISCONTINUED | OUTPATIENT
Start: 2025-03-13 | End: 2025-03-13 | Stop reason: SDUPTHER

## 2025-03-13 RX ORDER — OXYCODONE HYDROCHLORIDE 5 MG/1
5 TABLET ORAL EVERY 6 HOURS PRN
Qty: 28 TABLET | Refills: 0 | Status: SHIPPED | OUTPATIENT
Start: 2025-03-13 | End: 2025-03-14

## 2025-03-13 RX ORDER — MIDAZOLAM HYDROCHLORIDE 1 MG/ML
INJECTION, SOLUTION INTRAMUSCULAR; INTRAVENOUS
Status: COMPLETED
Start: 2025-03-13 | End: 2025-03-13

## 2025-03-13 RX ORDER — ENOXAPARIN SODIUM 100 MG/ML
30 INJECTION SUBCUTANEOUS 2 TIMES DAILY
Status: DISCONTINUED | OUTPATIENT
Start: 2025-03-14 | End: 2025-03-14 | Stop reason: HOSPADM

## 2025-03-13 RX ORDER — PREDNISONE 10 MG/1
10 TABLET ORAL DAILY
Qty: 10 TABLET | Refills: 0 | Status: SHIPPED | OUTPATIENT
Start: 2025-03-13 | End: 2025-03-14

## 2025-03-13 RX ORDER — ONDANSETRON 2 MG/ML
INJECTION INTRAMUSCULAR; INTRAVENOUS
Status: DISCONTINUED | OUTPATIENT
Start: 2025-03-13 | End: 2025-03-13 | Stop reason: SDUPTHER

## 2025-03-13 RX ORDER — LAMOTRIGINE 100 MG/1
200 TABLET ORAL NIGHTLY
Status: DISCONTINUED | OUTPATIENT
Start: 2025-03-13 | End: 2025-03-14 | Stop reason: HOSPADM

## 2025-03-13 RX ORDER — ACETAMINOPHEN 500 MG
1000 TABLET ORAL ONCE
Status: COMPLETED | OUTPATIENT
Start: 2025-03-13 | End: 2025-03-13

## 2025-03-13 RX ORDER — OXYCODONE HYDROCHLORIDE 5 MG/1
10 TABLET ORAL PRN
Status: DISCONTINUED | OUTPATIENT
Start: 2025-03-13 | End: 2025-03-13 | Stop reason: HOSPADM

## 2025-03-13 RX ORDER — METOPROLOL SUCCINATE 50 MG/1
100 TABLET, EXTENDED RELEASE ORAL DAILY
Status: DISCONTINUED | OUTPATIENT
Start: 2025-03-14 | End: 2025-03-14 | Stop reason: HOSPADM

## 2025-03-13 RX ORDER — SODIUM CHLORIDE, SODIUM LACTATE, POTASSIUM CHLORIDE, CALCIUM CHLORIDE 600; 310; 30; 20 MG/100ML; MG/100ML; MG/100ML; MG/100ML
INJECTION, SOLUTION INTRAVENOUS CONTINUOUS
Status: DISCONTINUED | OUTPATIENT
Start: 2025-03-13 | End: 2025-03-13 | Stop reason: HOSPADM

## 2025-03-13 RX ORDER — SODIUM CHLORIDE 0.9 % (FLUSH) 0.9 %
5-40 SYRINGE (ML) INJECTION EVERY 12 HOURS SCHEDULED
Status: DISCONTINUED | OUTPATIENT
Start: 2025-03-13 | End: 2025-03-14 | Stop reason: HOSPADM

## 2025-03-13 RX ORDER — TRANEXAMIC ACID 10 MG/ML
1000 INJECTION, SOLUTION INTRAVENOUS
Status: DISCONTINUED | OUTPATIENT
Start: 2025-03-13 | End: 2025-03-13 | Stop reason: HOSPADM

## 2025-03-13 RX ORDER — MEPERIDINE HYDROCHLORIDE 50 MG/ML
12.5 INJECTION INTRAMUSCULAR; INTRAVENOUS; SUBCUTANEOUS EVERY 5 MIN PRN
Status: DISCONTINUED | OUTPATIENT
Start: 2025-03-13 | End: 2025-03-13 | Stop reason: HOSPADM

## 2025-03-13 RX ORDER — CELECOXIB 100 MG/1
200 CAPSULE ORAL ONCE
Status: DISCONTINUED | OUTPATIENT
Start: 2025-03-13 | End: 2025-03-13

## 2025-03-13 RX ORDER — MORPHINE SULFATE 4 MG/ML
4 INJECTION, SOLUTION INTRAMUSCULAR; INTRAVENOUS
Status: DISCONTINUED | OUTPATIENT
Start: 2025-03-13 | End: 2025-03-14

## 2025-03-13 RX ORDER — PANTOPRAZOLE SODIUM 40 MG/1
40 TABLET, DELAYED RELEASE ORAL
Status: DISCONTINUED | OUTPATIENT
Start: 2025-03-14 | End: 2025-03-14 | Stop reason: HOSPADM

## 2025-03-13 RX ORDER — CLINDAMYCIN PHOSPHATE 600 MG/50ML
600 INJECTION, SOLUTION INTRAVENOUS ONCE
Status: COMPLETED | OUTPATIENT
Start: 2025-03-13 | End: 2025-03-13

## 2025-03-13 RX ORDER — PSEUDOEPHED/ACETAMINOPH/DIPHEN 30MG-500MG
1000 TABLET ORAL EVERY 8 HOURS
Qty: 60 TABLET | Refills: 5 | Status: SHIPPED | OUTPATIENT
Start: 2025-03-13

## 2025-03-13 RX ORDER — ERGOCALCIFEROL 1.25 MG/1
50000 CAPSULE, LIQUID FILLED ORAL WEEKLY
Status: DISCONTINUED | OUTPATIENT
Start: 2025-03-13 | End: 2025-03-14 | Stop reason: HOSPADM

## 2025-03-13 RX ORDER — CLONAZEPAM 0.5 MG/1
0.5 TABLET ORAL NIGHTLY
Status: DISCONTINUED | OUTPATIENT
Start: 2025-03-13 | End: 2025-03-14 | Stop reason: HOSPADM

## 2025-03-13 RX ORDER — DEXAMETHASONE SODIUM PHOSPHATE 4 MG/ML
8 INJECTION, SOLUTION INTRA-ARTICULAR; INTRALESIONAL; INTRAMUSCULAR; INTRAVENOUS; SOFT TISSUE ONCE
Status: DISCONTINUED | OUTPATIENT
Start: 2025-03-13 | End: 2025-03-13 | Stop reason: HOSPADM

## 2025-03-13 RX ORDER — FENTANYL CITRATE 50 UG/ML
INJECTION, SOLUTION INTRAMUSCULAR; INTRAVENOUS
Status: DISCONTINUED | OUTPATIENT
Start: 2025-03-13 | End: 2025-03-13 | Stop reason: SDUPTHER

## 2025-03-13 RX ORDER — ROCURONIUM BROMIDE 10 MG/ML
INJECTION, SOLUTION INTRAVENOUS
Status: DISCONTINUED | OUTPATIENT
Start: 2025-03-13 | End: 2025-03-13 | Stop reason: SDUPTHER

## 2025-03-13 RX ORDER — ACETAMINOPHEN 500 MG
1000 TABLET ORAL ONCE
Status: DISCONTINUED | OUTPATIENT
Start: 2025-03-13 | End: 2025-03-13 | Stop reason: SDUPTHER

## 2025-03-13 RX ADMIN — PROPOFOL 200 MG: 10 INJECTION, EMULSION INTRAVENOUS at 09:43

## 2025-03-13 RX ADMIN — OXYCODONE 10 MG: 5 TABLET ORAL at 17:43

## 2025-03-13 RX ADMIN — METHOCARBAMOL 1000 MG: 100 INJECTION INTRAMUSCULAR; INTRAVENOUS at 10:53

## 2025-03-13 RX ADMIN — BUSPIRONE HYDROCHLORIDE 20 MG: 5 TABLET ORAL at 19:52

## 2025-03-13 RX ADMIN — KETOROLAC TROMETHAMINE 15 MG: 30 INJECTION, SOLUTION INTRAMUSCULAR; INTRAVENOUS at 10:54

## 2025-03-13 RX ADMIN — OXYCODONE 10 MG: 5 TABLET ORAL at 23:50

## 2025-03-13 RX ADMIN — ESCITALOPRAM OXALATE 20 MG: 10 TABLET ORAL at 18:41

## 2025-03-13 RX ADMIN — Medication 10 ML: at 21:01

## 2025-03-13 RX ADMIN — ONDANSETRON 4 MG: 2 INJECTION INTRAMUSCULAR; INTRAVENOUS at 10:54

## 2025-03-13 RX ADMIN — CLINDAMYCIN PHOSPHATE 600 MG: 600 INJECTION, SOLUTION INTRAVENOUS at 10:18

## 2025-03-13 RX ADMIN — LAMOTRIGINE 200 MG: 100 TABLET ORAL at 19:52

## 2025-03-13 RX ADMIN — HYDROMORPHONE HYDROCHLORIDE 0.5 MG: 1 INJECTION, SOLUTION INTRAMUSCULAR; INTRAVENOUS; SUBCUTANEOUS at 13:20

## 2025-03-13 RX ADMIN — DIPHENHYDRAMINE HYDROCHLORIDE 6.5 MG: 50 INJECTION INTRAMUSCULAR; INTRAVENOUS at 13:21

## 2025-03-13 RX ADMIN — DEXAMETHASONE SODIUM PHOSPHATE 8 MG: 10 INJECTION INTRAMUSCULAR; INTRAVENOUS at 18:42

## 2025-03-13 RX ADMIN — ACETAMINOPHEN 1000 MG: 500 TABLET ORAL at 21:02

## 2025-03-13 RX ADMIN — CARBIDOPA AND LEVODOPA 1 TABLET: 25; 100 TABLET ORAL at 18:42

## 2025-03-13 RX ADMIN — ERGOCALCIFEROL 50000 UNITS: 1.25 CAPSULE ORAL at 18:41

## 2025-03-13 RX ADMIN — Medication 8 PUFF: at 11:30

## 2025-03-13 RX ADMIN — CELECOXIB 400 MG: 400 CAPSULE ORAL at 08:59

## 2025-03-13 RX ADMIN — FENTANYL CITRATE 100 MCG: 50 INJECTION, SOLUTION INTRAMUSCULAR; INTRAVENOUS at 09:17

## 2025-03-13 RX ADMIN — MAGNESIUM SULFATE IN WATER 2000 MG: 40 INJECTION, SOLUTION INTRAVENOUS at 09:54

## 2025-03-13 RX ADMIN — CEFAZOLIN 3000 MG: 3 INJECTION, POWDER, FOR SOLUTION INTRAVENOUS at 09:50

## 2025-03-13 RX ADMIN — SUGAMMADEX 200 MG: 100 INJECTION, SOLUTION INTRAVENOUS at 11:25

## 2025-03-13 RX ADMIN — FENTANYL CITRATE 100 MCG: 50 INJECTION, SOLUTION INTRAMUSCULAR; INTRAVENOUS at 09:41

## 2025-03-13 RX ADMIN — HYDROMORPHONE HYDROCHLORIDE 0.5 MG: 1 INJECTION, SOLUTION INTRAMUSCULAR; INTRAVENOUS; SUBCUTANEOUS at 12:30

## 2025-03-13 RX ADMIN — ACETAMINOPHEN 1000 MG: 500 TABLET ORAL at 08:59

## 2025-03-13 RX ADMIN — SODIUM CHLORIDE: 9 INJECTION, SOLUTION INTRAVENOUS at 09:38

## 2025-03-13 RX ADMIN — DEXAMETHASONE SODIUM PHOSPHATE 10 MG: 4 INJECTION, SOLUTION INTRAMUSCULAR; INTRAVENOUS at 09:43

## 2025-03-13 RX ADMIN — ROCURONIUM BROMIDE 50 MG: 50 INJECTION, SOLUTION INTRAVENOUS at 09:43

## 2025-03-13 RX ADMIN — CARBIDOPA AND LEVODOPA 1 TABLET: 25; 100 TABLET ORAL at 21:01

## 2025-03-13 RX ADMIN — HYDROMORPHONE HYDROCHLORIDE 0.5 MG: 2 INJECTION, SOLUTION INTRAMUSCULAR; INTRAVENOUS; SUBCUTANEOUS at 10:23

## 2025-03-13 RX ADMIN — Medication 10 ML: at 19:56

## 2025-03-13 RX ADMIN — CARIPRAZINE 4.5 MG: 1.5 CAPSULE, GELATIN COATED ORAL at 18:41

## 2025-03-13 RX ADMIN — Medication 2 AMPULE: at 08:59

## 2025-03-13 RX ADMIN — BISACODYL 5 MG: 5 TABLET, COATED ORAL at 18:42

## 2025-03-13 RX ADMIN — MIDAZOLAM 2 MG: 1 INJECTION INTRAMUSCULAR; INTRAVENOUS at 09:17

## 2025-03-13 RX ADMIN — CLONAZEPAM 0.5 MG: 0.5 TABLET ORAL at 19:52

## 2025-03-13 RX ADMIN — LIDOCAINE HYDROCHLORIDE 100 MG: 20 INJECTION, SOLUTION INFILTRATION; PERINEURAL at 09:43

## 2025-03-13 RX ADMIN — SUGAMMADEX 200 MG: 100 INJECTION, SOLUTION INTRAVENOUS at 11:04

## 2025-03-13 ASSESSMENT — PAIN SCALES - GENERAL
PAINLEVEL_OUTOF10: 3
PAINLEVEL_OUTOF10: 7
PAINLEVEL_OUTOF10: 0
PAINLEVEL_OUTOF10: 8
PAINLEVEL_OUTOF10: 6
PAINLEVEL_OUTOF10: 6

## 2025-03-13 ASSESSMENT — PAIN DESCRIPTION - ORIENTATION
ORIENTATION: RIGHT
ORIENTATION: RIGHT

## 2025-03-13 ASSESSMENT — PAIN DESCRIPTION - LOCATION
LOCATION: KNEE
LOCATION: KNEE

## 2025-03-13 ASSESSMENT — PAIN - FUNCTIONAL ASSESSMENT: PAIN_FUNCTIONAL_ASSESSMENT: 0-10

## 2025-03-13 ASSESSMENT — PAIN DESCRIPTION - DESCRIPTORS
DESCRIPTORS: TIGHTNESS;SORE
DESCRIPTORS: SORE

## 2025-03-13 NOTE — PROGRESS NOTES
Pharmacist Review and Automatic Dose Adjustment of Prophylactic Enoxaparin    Reviewed reason(s) for admission/hospital problem list    The reviewing pharmacist has made an adjustment to the ordered enoxaparin dose or converted to UFH per the approved [facility] protocol and table as identified below.        Blaire Hamm is a 45 y.o. female.     No results for input(s): \"CREATININE\" in the last 72 hours.    Estimated Creatinine Clearance: 105 mL/min (based on SCr of 0.9 mg/dL).    No results for input(s): \"HGB\", \"HCT\", \"PLT\" in the last 72 hours.  No results for input(s): \"INR\" in the last 72 hours.    Height:   Ht Readings from Last 1 Encounters:   03/13/25 1.651 m (5' 5\")     Weight:  Wt Readings from Last 1 Encounters:   03/13/25 124.7 kg (275 lb)               Plan: Based upon the patient's weight and renal function    Ordered: Enoxaparin 40mg SUBQ Daily    Changed/converted to    New Order: Enoxaparin 30mg SUBQ BID      Thank you,  Winnie Cassidy, [facility]  3/13/2025, 6:21 PM

## 2025-03-13 NOTE — DISCHARGE INSTRUCTIONS
Please contact Lisa Aleman Orthopaedic Nurse Navigator with any questions or concerns after your discharge. Mon- Fri 9am- 5pm [phone]. If you have any issues or concerns after 5pm or on the weekend please call your surgeon's office. I will be contacting you in a few days to follow up.    If you need a pain medication refill please contact your surgeon's office.        Dr. Diaz Total Knee Replacement  Discharge Instructions      Activity: The first week after surgery is all about Ice, Elevation and Rest!        [place] way to elevate knee above heart, while keeping the knee straight.     Pillows should be placed under the FOOT and leg, such that the leg is held straight.   You should feel stretching in the back of the knee. If there is too much pain add pillows under the knee, but the leg should be as straight as possible  Placing pillows under the knee only and keeping the knee bent will make it very difficult to get it straight with physical therapy.  Use a walker when ambulating.    Physical therapy.   Typically starts 10-14 days after surgery unless otherwise instructed  Referral placed to outpatient location discussed with surgeon or for home PT if you do not have transportation for outpatient PT    To prevent Clot formation, you have been placed on the following medication:  Eliquis 2.5mg twice daily by mouth for 30 days post op    Surgical Site Care:  Keep dressing in place until follow up visit, Call the office if drainage  Showering is permitted with waterproof Mepilex dressing   Will remove dressing at first follow up appointment    Pain Medications  First and foremost you should take Tylenol and Celebrex as prescribed for baseline pain control  If you have medical reasons not to take either medicine they were not prescribed  You were also given Oxycodone as needed  This for is pain despite the other medications and is typically needed for the first 3-5 days after surgery  Be sure to drink

## 2025-03-13 NOTE — PROGRESS NOTES
Dr. Vargas called to clarify duplicate Celebrex orders.  Per MD give 400mg PO Celebrex x1 in pre op.

## 2025-03-13 NOTE — ANESTHESIA POSTPROCEDURE EVALUATION
Department of Anesthesiology  Postprocedure Note    Patient: Blaire Hamm  MRN: [id number]  YOB: 1980  Date of evaluation: 3/13/2025    Procedure Summary       Date: 03/13/25 Room / Location: [address]    Anesthesia Start: 0938 Anesthesia Stop: 1142    Procedure: RIGHT TOTAL KNEE ARTHROPLASTY                 RONEL (Right: Knee) Diagnosis:       Osteoarthritis of right knee      (Osteoarthritis of right knee [M17.11])    Surgeons: Gt Diaz MD Responsible Provider: Ann Rodrigues MD    Anesthesia Type: general ASA Status: 3            Anesthesia Type: No value filed.    Ankit Phase I: Ankit Score: 4    Ankit Phase II:      Anesthesia Post Evaluation    Comments: Anes Post-op Note    Name:    Blaire Hamm  MRN:      [id number]    Patient Vitals in the past 12 hrs:  03/13/25 1138, BP:110/71, Temp:97.7 °F (36.5 °C), Temp src:Temporal, Pulse:86, Resp:16, SpO2:94 %  03/13/25 0910, BP:125/87, Pulse:94, Resp:16, SpO2:96 %  03/13/25 0828, BP:(!) 128/110, Temp:97.7 °F (36.5 °C), Temp src:Oral, Resp:14, SpO2:95 %     LABS:    CBC  Lab Results       Component                Value               Date/Time                  WBC                      8.9                 03/09/2025 12:45 PM        HGB                      14.7                03/09/2025 12:45 PM        HCT                      46.5                03/09/2025 12:45 PM        PLT                      420                 03/09/2025 12:45 PM   RENAL  Lab Results       Component                Value               Date/Time                  NA                       140                 03/09/2025 12:45 PM        K                        3.6                 03/09/2025 12:45 PM        K                        4.6                 03/16/2023 05:50 AM        CL                       100                 03/09/2025 12:45 PM        CO2                      25                  03/09/2025 12:45 PM        BUN                      10

## 2025-03-13 NOTE — PROGRESS NOTES
Per anesthesia, no EKG needed prior to surgery   Complex Repair And Double M Plasty Text: The defect edges were debeveled with a #15 scalpel blade.  The primary defect was closed partially with a complex linear closure.  Given the location of the remaining defect, shape of the defect and the proximity to free margins a double M plasty was deemed most appropriate for complete closure of the defect.  Using a sterile surgical marker, an appropriate advancement flap was drawn incorporating the defect and placing the expected incisions within the relaxed skin tension lines where possible.    The area thus outlined was incised deep to adipose tissue with a #15 scalpel blade.  The skin margins were undermined to an appropriate distance in all directions utilizing iris scissors.

## 2025-03-13 NOTE — ANESTHESIA PRE PROCEDURE
Counseling given: Not Answered      Vital Signs (Current):   Vitals:    03/05/25 1254   Weight: 124.3 kg (274 lb)   Height: 1.626 m (5' 4\")                                              BP Readings from Last 3 Encounters:   03/03/25 118/87   02/13/25 120/80   01/15/25 128/82       NPO Status:                                                                                 BMI:   Wt Readings from Last 3 Encounters:   03/05/25 124.3 kg (274 lb)   03/03/25 124.3 kg (274 lb)   02/14/25 124.7 kg (275 lb)     Body mass index is 47.03 kg/m².    CBC:   Lab Results   Component Value Date/Time    WBC 8.9 03/09/2025 12:45 PM    RBC 5.75 03/09/2025 12:45 PM    HGB 14.7 03/09/2025 12:45 PM    HCT 46.5 03/09/2025 12:45 PM    MCV 80.9 03/09/2025 12:45 PM    RDW 17.2 03/09/2025 12:45 PM     03/09/2025 12:45 PM       CMP:   Lab Results   Component Value Date/Time     03/09/2025 12:45 PM    K 3.6 03/09/2025 12:45 PM    K 4.6 03/16/2023 05:50 AM     03/09/2025 12:45 PM    CO2 25 03/09/2025 12:45 PM    BUN 10 03/09/2025 12:45 PM    CREATININE 0.9 03/09/2025 12:45 PM    GFRAA >60 09/29/2022 04:53 PM    GFRAA >60 11/13/2012 04:13 PM    AGRATIO 1.2 03/09/2025 12:45 PM    LABGLOM 80 03/09/2025 12:45 PM    LABGLOM >60 02/01/2024 09:22 AM    GLUCOSE 107 03/09/2025 12:45 PM    CALCIUM 9.7 03/09/2025 12:45 PM    BILITOT <0.2 03/09/2025 12:45 PM    ALKPHOS 95 03/09/2025 12:45 PM    AST 18 03/09/2025 12:45 PM    ALT 19 03/09/2025 12:45 PM       POC Tests: No results for input(s): \"POCGLU\", \"POCNA\", \"POCK\", \"POCCL\", \"POCBUN\", \"POCHEMO\", \"POCHCT\" in the last 72 hours.    Coags:   Lab Results   Component Value Date/Time    PROTIME 12.6 03/09/2025 12:45 PM    INR 0.92 03/09/2025 12:45 PM    APTT 25.6 03/09/2025 12:45 PM       HCG (If Applicable):   Lab Results   Component Value Date    PREGTESTUR Negative 03/13/2025    PREGSERUM Negative 07/16/2018        ABGs: No results found for: \"PHART\", \"PO2ART\",

## 2025-03-13 NOTE — PROGRESS NOTES
Physical Therapy    Therapy orders received and Pt chart reviewed. Attempted to see pt this afternoon. Per discussion with RN pt not appropriate for therapy at this time and requested therapy call back in ~ 30 min.  Will re-attempt when medically appropriate    Update: called again and per RN pt in too much pain to participate in therapy at this time and is declining. Offered to call back in an hour however per RN pt is to be admitted.    Please call if needs change/pt not to be admitted.    Update: confirmed again at 3:12 pm pt is being admitted once room available       Arabella Rich, PT

## 2025-03-13 NOTE — PROGRESS NOTES
4 Eyes Skin Assessment     NAME:  Blaire Hamm  YOB: 1980  MEDICAL RECORD NUMBER:  [id number]    The patient is being assessed for  Admission    I agree that at least one RN has performed a thorough Head to Toe Skin Assessment on the patient. ALL assessment sites listed below have been assessed.      Areas assessed by both nurses:    Head, Face, Ears, Shoulders, Back, Chest, Arms, Elbows, Hands, Sacrum. Buttock, Coccyx, Ischium, Legs. Feet and Heels, and Under Medical Devices         Does the Patient have a Wound? No noted wound(s)       Nestor Prevention initiated by RN: No  Wound Care Orders initiated by RN: No    Pressure Injury (Stage 3,4, Unstageable, DTI, NWPT, and Complex wounds) if present, place Wound referral order by RN under : No    New Ostomies, if present place, Ostomy referral order under : No     Nurse 1 eSignature: Electronically signed by Jessica Choudhury RN on 3/13/25 at 6:18 PM EDT    **SHARE this note so that the co-signing nurse can place an eSignature**    Nurse 2 eSignature: Electronically signed by Day Piedra RN on 3/13/25 at 6:24 PM EDT

## 2025-03-13 NOTE — PROGRESS NOTES
Patient admitted to [facility] 9 in preparation for surgery, VSS. Consent confirmed. IV inserted into R hand by anesthesia. ERAS protocol initiated. Warming blanket applied, sacral foam in place, warm fluids infusing. IS education completed. Belongings clothing, cane, glasses. NPO since 2100.  Family at bedside, phone number in system for text updates, call light within reach.

## 2025-03-13 NOTE — OP NOTE
Operative Note      Patient: Blaire Hamm  YOB: 1980  MRN: [id number]    Date of Procedure: 3/13/2025    Pre-Op Diagnosis Codes:      * Osteoarthritis of right knee [M17.11]    Post-Op Diagnosis: Same       Procedure(s):  RIGHT TOTAL KNEE ARTHROPLASTY                 RONEL    Surgeon(s):  Gt Diaz MD    Assistant:   Surgical Assistant: Gt Dunne    Anesthesia: General    Estimated Blood Loss (mL): less than 100     Complications: None    Specimens:   * No specimens in log *    Implants:  Implant Name Type Inv. Item Serial No.  Lot No. LRB No. Used Action   CEMENT BONE 40GM HI VISC PALACOS R - [id number]  CEMENT BONE 40GM HI VISC PALACOS R  [employer]grade- [id number] Right 1 Implanted   COMPONENT PAT [id number] THK8.5MM STD KNEE VIVACIT-E JOI - [id number]  COMPONENT PAT [id number] THK8.5MM STD KNEE VIVACIT-E JOI  [employer]- [id number] Right 1 Implanted   PSN MC VE ASF R 12MM 4-5/CD - [id number]  PSN MC VE ASF R 12MM 4-5/CD  [employer]- [id number] Right 1 Implanted   BASEPLATE TIB KEELED 0 DEG D RT KNEE SPIKE OSSEOTI PERSONA - [id number]  BASEPLATE TIB KEELED 0 DEG D RT KNEE SPIKE OSSEOTI PERSONA  [employer]- [id number] Right 1 Implanted   COMPONENT FEM CR CARMELO 5 RT KNEE JOI COCR STRL PERSONA LTX - [id number]  COMPONENT FEM CR CARMELO 5 RT KNEE JOI COCR STRL PERSONA LTX  [employer]- [id number] Right 1 Implanted         Drains: * No LDAs found *    Findings:  Infection Present At Time Of Surgery (PATOS) (choose all levels that have infection present):  No infection present  Other Findings: PF OA, patella maisha. Lateral patellar instability in flexion pre op resolved post op.     Detailed Description of Procedure:     Clinical Indications: This patient was evaluated for R knee PF OA and patellar instability by myself, noted to have failed extensive conservative measures and desired to have surgical intervention. Risks

## 2025-03-14 VITALS
HEIGHT: 65 IN | WEIGHT: 275 LBS | RESPIRATION RATE: 16 BRPM | HEART RATE: 83 BPM | BODY MASS INDEX: 45.82 KG/M2 | DIASTOLIC BLOOD PRESSURE: 82 MMHG | SYSTOLIC BLOOD PRESSURE: 115 MMHG | TEMPERATURE: 97.5 F | OXYGEN SATURATION: 93 %

## 2025-03-14 PROCEDURE — 97530 THERAPEUTIC ACTIVITIES: CPT

## 2025-03-14 PROCEDURE — 97162 PT EVAL MOD COMPLEX 30 MIN: CPT

## 2025-03-14 PROCEDURE — 97166 OT EVAL MOD COMPLEX 45 MIN: CPT

## 2025-03-14 PROCEDURE — 97116 GAIT TRAINING THERAPY: CPT

## 2025-03-14 PROCEDURE — 96374 THER/PROPH/DIAG INJ IV PUSH: CPT

## 2025-03-14 PROCEDURE — 97535 SELF CARE MNGMENT TRAINING: CPT

## 2025-03-14 PROCEDURE — G0378 HOSPITAL OBSERVATION PER HR: HCPCS

## 2025-03-14 PROCEDURE — 96376 TX/PRO/DX INJ SAME DRUG ADON: CPT

## 2025-03-14 PROCEDURE — 2500000003 HC RX 250 WO HCPCS: Performed by: STUDENT IN AN ORGANIZED HEALTH CARE EDUCATION/TRAINING PROGRAM

## 2025-03-14 PROCEDURE — 6370000000 HC RX 637 (ALT 250 FOR IP): Performed by: STUDENT IN AN ORGANIZED HEALTH CARE EDUCATION/TRAINING PROGRAM

## 2025-03-14 PROCEDURE — 2580000003 HC RX 258: Performed by: STUDENT IN AN ORGANIZED HEALTH CARE EDUCATION/TRAINING PROGRAM

## 2025-03-14 PROCEDURE — 6360000002 HC RX W HCPCS: Performed by: STUDENT IN AN ORGANIZED HEALTH CARE EDUCATION/TRAINING PROGRAM

## 2025-03-14 PROCEDURE — 96372 THER/PROPH/DIAG INJ SC/IM: CPT

## 2025-03-14 PROCEDURE — 99024 POSTOP FOLLOW-UP VISIT: CPT | Performed by: SPECIALIST/TECHNOLOGIST

## 2025-03-14 PROCEDURE — 97110 THERAPEUTIC EXERCISES: CPT

## 2025-03-14 RX ORDER — OXYCODONE HYDROCHLORIDE 5 MG/1
5 TABLET ORAL EVERY 6 HOURS PRN
Qty: 28 TABLET | Refills: 0 | Status: SHIPPED | OUTPATIENT
Start: 2025-03-14 | End: 2025-03-20 | Stop reason: SDUPTHER

## 2025-03-14 RX ORDER — BISACODYL 5 MG/1
5 TABLET, DELAYED RELEASE ORAL DAILY
Qty: 14 TABLET | Refills: 0 | Status: SHIPPED | OUTPATIENT
Start: 2025-03-15 | End: 2025-03-29

## 2025-03-14 RX ORDER — METHOCARBAMOL 500 MG/1
500 TABLET, FILM COATED ORAL 4 TIMES DAILY PRN
Qty: 40 TABLET | Refills: 0 | Status: SHIPPED | OUTPATIENT
Start: 2025-03-14 | End: 2025-03-24

## 2025-03-14 RX ORDER — ONDANSETRON 4 MG/1
4 TABLET, ORALLY DISINTEGRATING ORAL EVERY 8 HOURS PRN
Qty: 21 TABLET | Refills: 0 | Status: SHIPPED | OUTPATIENT
Start: 2025-03-14 | End: 2025-03-14

## 2025-03-14 RX ORDER — ONDANSETRON 4 MG/1
4 TABLET, ORALLY DISINTEGRATING ORAL EVERY 8 HOURS PRN
Qty: 21 TABLET | Refills: 0 | Status: SHIPPED | OUTPATIENT
Start: 2025-03-14 | End: 2025-03-21

## 2025-03-14 RX ORDER — ACETAMINOPHEN 500 MG
1000 TABLET ORAL EVERY 8 HOURS SCHEDULED
COMMUNITY
Start: 2025-03-14 | End: 2025-03-14

## 2025-03-14 RX ORDER — PREDNISONE 10 MG/1
10 TABLET ORAL DAILY
Qty: 10 TABLET | Refills: 0 | Status: SHIPPED | OUTPATIENT
Start: 2025-03-14 | End: 2025-03-24

## 2025-03-14 RX ORDER — BISACODYL 5 MG/1
5 TABLET, DELAYED RELEASE ORAL DAILY
COMMUNITY
Start: 2025-03-15 | End: 2025-03-14

## 2025-03-14 RX ORDER — ACETAMINOPHEN 500 MG
1000 TABLET ORAL EVERY 8 HOURS SCHEDULED
Qty: 180 TABLET | Refills: 0 | Status: SHIPPED | OUTPATIENT
Start: 2025-03-14 | End: 2025-04-13

## 2025-03-14 RX ADMIN — DEXAMETHASONE SODIUM PHOSPHATE 8 MG: 10 INJECTION INTRAMUSCULAR; INTRAVENOUS at 10:49

## 2025-03-14 RX ADMIN — BISACODYL 5 MG: 5 TABLET, COATED ORAL at 09:27

## 2025-03-14 RX ADMIN — CEFAZOLIN 3000 MG: 3 INJECTION, POWDER, FOR SOLUTION INTRAVENOUS at 09:27

## 2025-03-14 RX ADMIN — OXYCODONE 10 MG: 5 TABLET ORAL at 15:02

## 2025-03-14 RX ADMIN — ONDANSETRON 4 MG: 4 TABLET, ORALLY DISINTEGRATING ORAL at 15:02

## 2025-03-14 RX ADMIN — DEXAMETHASONE SODIUM PHOSPHATE 8 MG: 10 INJECTION INTRAMUSCULAR; INTRAVENOUS at 01:33

## 2025-03-14 RX ADMIN — OXYCODONE 10 MG: 5 TABLET ORAL at 09:27

## 2025-03-14 RX ADMIN — ACETAMINOPHEN 1000 MG: 500 TABLET ORAL at 15:02

## 2025-03-14 RX ADMIN — ENOXAPARIN SODIUM 30 MG: 100 INJECTION SUBCUTANEOUS at 05:54

## 2025-03-14 RX ADMIN — Medication 10 ML: at 09:23

## 2025-03-14 RX ADMIN — SODIUM CHLORIDE: 0.9 INJECTION, SOLUTION INTRAVENOUS at 00:57

## 2025-03-14 RX ADMIN — CARBIDOPA AND LEVODOPA 1 TABLET: 25; 100 TABLET ORAL at 08:10

## 2025-03-14 RX ADMIN — METOPROLOL SUCCINATE 100 MG: 50 TABLET, EXTENDED RELEASE ORAL at 09:32

## 2025-03-14 RX ADMIN — ACETAMINOPHEN 1000 MG: 500 TABLET ORAL at 05:54

## 2025-03-14 RX ADMIN — Medication 20 ML: at 10:50

## 2025-03-14 RX ADMIN — CARBIDOPA AND LEVODOPA 1 TABLET: 25; 100 TABLET ORAL at 15:03

## 2025-03-14 RX ADMIN — PANTOPRAZOLE SODIUM 40 MG: 40 TABLET, DELAYED RELEASE ORAL at 05:54

## 2025-03-14 RX ADMIN — Medication 10 ML: at 01:34

## 2025-03-14 RX ADMIN — CEFAZOLIN 3000 MG: 3 INJECTION, POWDER, FOR SOLUTION INTRAVENOUS at 00:59

## 2025-03-14 RX ADMIN — ESCITALOPRAM OXALATE 20 MG: 10 TABLET ORAL at 09:27

## 2025-03-14 ASSESSMENT — PAIN DESCRIPTION - LOCATION
LOCATION: KNEE

## 2025-03-14 ASSESSMENT — PAIN SCALES - GENERAL
PAINLEVEL_OUTOF10: 6
PAINLEVEL_OUTOF10: 6
PAINLEVEL_OUTOF10: 5
PAINLEVEL_OUTOF10: 7
PAINLEVEL_OUTOF10: 6

## 2025-03-14 ASSESSMENT — PAIN DESCRIPTION - DESCRIPTORS: DESCRIPTORS: ACHING

## 2025-03-14 ASSESSMENT — PAIN DESCRIPTION - ORIENTATION
ORIENTATION: RIGHT;LEFT
ORIENTATION: RIGHT
ORIENTATION: RIGHT

## 2025-03-14 NOTE — DISCHARGE INSTR - COC
Continuity of Care Form    Patient Name: Blaire Hamm   :  1980  MRN:  [id number]    Admit date:  3/13/2025  Discharge date:  ***    Code Status Order: Full Code   Advance Directives:    Date/Time Healthcare Directive Type of Healthcare Directive Copy in Chart Healthcare Agent Appointed Healthcare Agent's Name Healthcare Agent's Phone Number    [phone] No, patient does not have an advance directive for healthcare treatment  --  --  --  --  --             Admitting Physician:  Gt Diaz MD  PCP: Mallory Sapp APRN - CNP    Discharging Nurse: ***  Discharging Hospital Unit/Room#: 0531/0531-01  Discharging Unit Phone Number: ***    Emergency Contact:   Extended Emergency Contact Information  Primary Emergency Contact: [place] [address] [facility]  Home Phone: [phone]  Relation: Parent    Past Surgical History:  Past Surgical History:   Procedure Laterality Date    FOOT SURGERY      KNEE SURGERY Left 6/12/15    LEG SURGERY      TOTAL KNEE ARTHROPLASTY Left 3/15/2023    LEFT TOTAL KNEE ARTHROPLASTY           RONEL BIOMET NOTE: ADDUCTOR CANAL BLOCK, IPAC performed by Gt Diaz MD at [facility] OR       Immunization History:   Immunization History   Administered Date(s) Administered    COVID-19, MODERNA BLUE border, Primary or Immunocompromised, (age 12y+), IM, 100 mcg/0.5mL 2021, 2021    COVID-19, MODERNA Booster BLUE border, (age 18y+), IM, 50mcg/0.25mL 2021    Influenza, FLUCELVAX, (age 6 mo+) IM, Trivalent PF, 0.5mL 10/09/2024    Influenza, FLUCELVAX, (age 6 mo+), MDCK, Quadv PF, 0.5mL 2022, 2023       Active Problems:  Patient Active Problem List   Diagnosis Code    Other cerebral palsy (HCC) G80.8    Bipolar disease, chronic (HCC) F31.9    MDD (major depressive disorder), recurrent episode, moderate (HCC) F33.1    PTSD (post-traumatic stress disorder) F43.10    Unequal leg length (acquired) M21.70    Pain in  Rehab): Good    Recommended Labs or Other Treatments After Discharge: PT/OT- WBAT to the RLE with assistive device   Eliquis 2.5mg BID as DVT prophylaxis for 30 days postoperatively  Thigh high compression stockings to be placed daily and removed nightly for 30 days post op  Follow-up with Dr. Diaz in 2-3 weeks postoperatively.  Call [facility] orthopedics at [phone] to schedule an appointment or with any questions.      Physician Certification: I certify the above information and transfer of Blaire AQUINO Hansel  is necessary for the continuing treatment of the diagnosis listed and that she requires Home Care for less 30 days.     Update Admission H&P: No change in H&P    PHYSICIAN SIGNATURE:  Electronically signed by JARVIS Jin on 3/14/25 at 10:18 AM EDT

## 2025-03-14 NOTE — PROGRESS NOTES
Department of Orthopedic Surgery  Progress Note      SUBJECTIVE: Pain controlled NAEON. Less pain than last time. Did well with PT. No new issues    OBJECTIVE    Physical  Dressing c/d/I NVID      VITALS:  /64   Pulse 87   Temp 97.3 °F (36.3 °C) (Oral)   Resp 16   Ht 1.651 m (5' 5\")   Wt 124.7 kg (275 lb)   SpO2 93%   BMI 45.76 kg/m²   24HR INTAKE/OUTPUT:      Intake/Output Summary (Last 24 hours) at 3/14/2025 1437  Last data filed at 3/13/2025 1951  Gross per 24 hour   Intake 100 ml   Output --   Net 100 ml     URINARY CATHETER OUTPUT (Beal):         Data    CBC:   Lab Results   Component Value Date/Time    WBC 8.9 03/09/2025 12:45 PM    RBC 5.75 03/09/2025 12:45 PM    HGB 14.7 03/09/2025 12:45 PM    HCT 46.5 03/09/2025 12:45 PM    MCV 80.9 03/09/2025 12:45 PM    MCH 25.6 03/09/2025 12:45 PM    MCHC 31.6 03/09/2025 12:45 PM    RDW 17.2 03/09/2025 12:45 PM     03/09/2025 12:45 PM    MPV 7.5 03/09/2025 12:45 PM     BMP:    Lab Results   Component Value Date/Time     03/09/2025 12:45 PM    K 3.6 03/09/2025 12:45 PM    K 4.6 03/16/2023 05:50 AM     03/09/2025 12:45 PM    CO2 25 03/09/2025 12:45 PM    BUN 10 03/09/2025 12:45 PM    CREATININE 0.9 03/09/2025 12:45 PM    CALCIUM 9.7 03/09/2025 12:45 PM    GFRAA >60 09/29/2022 04:53 PM    GFRAA >60 11/13/2012 04:13 PM    LABGLOM 80 03/09/2025 12:45 PM    LABGLOM >60 02/01/2024 09:22 AM    GLUCOSE 107 03/09/2025 12:45 PM     Current Inpatient Medications    Current Facility-Administered Medications: escitalopram (LEXAPRO) tablet 20 mg, 20 mg, Oral, Daily  lamoTRIgine (LAMICTAL) tablet 200 mg, 200 mg, Oral, Nightly  cariprazine hcl (VRAYLAR) capsule 4.5 mg, 4.5 mg, Oral, Daily  clonazePAM (KLONOPIN) tablet 0.5 mg, 0.5 mg, Oral, Nightly  carbidopa-levodopa (SINEMET)  MG per tablet 1 tablet, 1 tablet, Oral, TID  busPIRone (BUSPAR) tablet 20 mg, 20 mg, Oral, Nightly  meclizine (ANTIVERT) tablet 12.5 mg, 12.5 mg, Oral, TID PRN  pantoprazole  (PROTONIX) tablet 40 mg, 40 mg, Oral, QAM AC  metoprolol succinate (TOPROL XL) extended release tablet 100 mg, 100 mg, Oral, Daily  vitamin D (ERGOCALCIFEROL) capsule 50,000 Units, 50,000 Units, Oral, Weekly  sodium chloride flush 0.9 % injection 5-40 mL, 5-40 mL, IntraVENous, 2 times per day  sodium chloride flush 0.9 % injection 5-40 mL, 5-40 mL, IntraVENous, PRN  0.9 % sodium chloride infusion, , IntraVENous, PRN  acetaminophen (TYLENOL) tablet 1,000 mg, 1,000 mg, Oral, 3 times per day  oxyCODONE (ROXICODONE) immediate release tablet 5 mg, 5 mg, Oral, Q4H PRN **OR** oxyCODONE (ROXICODONE) immediate release tablet 10 mg, 10 mg, Oral, Q4H PRN  ondansetron (ZOFRAN-ODT) disintegrating tablet 4 mg, 4 mg, Oral, Q8H PRN **OR** ondansetron (ZOFRAN) injection 4 mg, 4 mg, IntraVENous, Q6H PRN  bisacodyl (DULCOLAX) EC tablet 5 mg, 5 mg, Oral, Daily  enoxaparin Sodium (LOVENOX) injection 30 mg, 30 mg, SubCUTAneous, BID    ASSESSMENT AND PLAN      POD# 1 R TKA    -Pain control minimizing opioids   -DVT ppx - Elilquis 1 mo  -WBAT  -PT/OT - cady in 1 week  -d/c planning home today with OP PT  -f/u with me 2/3 weeks as scheduled

## 2025-03-14 NOTE — CARE COORDINATION
Case Management Assessment  Initial Evaluation    Date/Time of Evaluation: 3/14/2025 12:04 PM  Assessment Completed by: ZAYRA MARQUEZ RN    If patient is discharged prior to next notation, then this note serves as note for discharge by case management.    Patient Name: Blaire Hamm                   YOB: 1980  Diagnosis: Osteoarthritis of right knee [M17.11]  S/P total knee arthroplasty, right [Z96.651]                   Date / Time: 3/13/2025  7:51 AM    Patient Admission Status: Observation   Readmission Risk (Low < 19, Mod (19-27), High > 27): No data recorded  Current PCP: Mallory Sapp APRN - CNP  PCP verified by CM? Yes    Chart Reviewed: Yes      History Provided by: Patient  Patient Orientation: Alert and Oriented    Patient Cognition: Alert    Hospitalization in the last 30 days (Readmission):  No    If yes, Readmission Assessment in CM Navigator will be completed.    Advance Directives:      Code Status: Full Code   Patient's Primary Decision Maker is: Legal Next of Kin      Discharge Planning:    Patient lives with: Children Type of Home: Apartment  Primary Care Giver: Self  Patient Support Systems include: Children, Family Members, [employer]   Current Financial resources: Medicaid  Current community resources: None  Current services prior to admission: Durable Medical Equipment            Current DME: Walker ([employer])            Type of Home Care services:  None    ADLS  Prior functional level: Assistance with the following:, Housework, Shopping, Cooking  Current functional level: Assistance with the following:, Cooking, Housework, Shopping    PT AM-PAC: 20 /24  OT AM-PAC:   /24    Family can provide assistance at DC: Yes  Would you like Case Management to discuss the discharge plan with any other family members/significant others, and if so, who? No  Plans to Return to Present Housing: Yes  Other Identified Issues/Barriers to RETURNING to current housing: None

## 2025-03-14 NOTE — PROGRESS NOTES
Physical Therapy  Facility/Department: [facility] C5 - MED SURG/ORTHO  Physical Therapy Initial Assessment/treatment    Name: Blaire Hamm  : 1980  MRN: [id number]  Date of Service: 3/14/2025    Discharge Recommendations:  24 hour supervision or assist, Home with Home health PT (OPPT if transport avail)   PT Equipment Recommendations  Equipment Needed: Yes  Mobility Devices: Walker  Walker: Rolling    If pt is unable to be seen after this session, please let this note serve as discharge summary.  Please see case management note for discharge disposition.  Thank you.    Patient Diagnosis(es): The primary encounter diagnosis was S/P total knee arthroplasty, right. A diagnosis of Sore throat was also pertinent to this visit.  Past Medical History:  has a past medical history of Arthritis, Bipolar disorder (HCC), Cerebral palsy (HCC), Depression, Hypertension, PONV (postoperative nausea and vomiting), and Prolonged emergence from general anesthesia.  Past Surgical History:  has a past surgical history that includes Foot surgery; Leg Surgery; knee surgery (Left, 6/12/15); Total knee arthroplasty (Left, 3/15/2023); and Total knee arthroplasty (Right, 3/13/2025).    Assessment  Body Structures, Functions, Activity Limitations Requiring Skilled Therapeutic Intervention: Decreased functional mobility ;Decreased strength;Decreased ROM;Decreased endurance;Decreased balance;Increased pain  Assessment: Pt presents to [facility] for R TKA. Pt reports baseline of living with sons with level entry and use of SPC indoors and rollator in community. Pt presents below baseline for PT eval with decreased strength, decreased ROM, decreased mobility, and decreased balance. Pt tolerated session well. Pt CGA for 20 ft with RW. Pt would continue to benefit from skilled PT to aid in the above deficits and a safe DC Home with 24 hr A and HHPT when medically appropriate.  Treatment Diagnosis: gait abnormality  Therapy Prognosis: Good  Decision

## 2025-03-14 NOTE — DISCHARGE SUMMARY
Department of Orthopedic Surgery  Physician Assistant   Discharge Summary    The Blaire Hamm is a 45 y.o. female underwent total knee replacement procedure without complication.  Blaire Hamm was admitted to the floor following Her recovery in the PACU.     Discharge Diagnosis  right Knee Arthroplasty    Current Inpatient Medications    Current Facility-Administered Medications: escitalopram (LEXAPRO) tablet 20 mg, 20 mg, Oral, Daily  lamoTRIgine (LAMICTAL) tablet 200 mg, 200 mg, Oral, Nightly  cariprazine hcl (VRAYLAR) capsule 4.5 mg, 4.5 mg, Oral, Daily  clonazePAM (KLONOPIN) tablet 0.5 mg, 0.5 mg, Oral, Nightly  carbidopa-levodopa (SINEMET)  MG per tablet 1 tablet, 1 tablet, Oral, TID  busPIRone (BUSPAR) tablet 20 mg, 20 mg, Oral, Nightly  meclizine (ANTIVERT) tablet 12.5 mg, 12.5 mg, Oral, TID PRN  pantoprazole (PROTONIX) tablet 40 mg, 40 mg, Oral, QAM AC  metoprolol succinate (TOPROL XL) extended release tablet 100 mg, 100 mg, Oral, Daily  vitamin D (ERGOCALCIFEROL) capsule 50,000 Units, 50,000 Units, Oral, Weekly  sodium chloride flush 0.9 % injection 5-40 mL, 5-40 mL, IntraVENous, 2 times per day  sodium chloride flush 0.9 % injection 5-40 mL, 5-40 mL, IntraVENous, PRN  0.9 % sodium chloride infusion, , IntraVENous, PRN  acetaminophen (TYLENOL) tablet 1,000 mg, 1,000 mg, Oral, 3 times per day  oxyCODONE (ROXICODONE) immediate release tablet 5 mg, 5 mg, Oral, Q4H PRN **OR** oxyCODONE (ROXICODONE) immediate release tablet 10 mg, 10 mg, Oral, Q4H PRN  ondansetron (ZOFRAN-ODT) disintegrating tablet 4 mg, 4 mg, Oral, Q8H PRN **OR** ondansetron (ZOFRAN) injection 4 mg, 4 mg, IntraVENous, Q6H PRN  bisacodyl (DULCOLAX) EC tablet 5 mg, 5 mg, Oral, Daily  dexAMETHasone (DECADRON) injection 8 mg, 8 mg, IntraVENous, Q8H  enoxaparin Sodium (LOVENOX) injection 30 mg, 30 mg, SubCUTAneous, BID    Post-operatively the patient’s diet was advanced as tolerated and their incision was checked prior to discharge.   4731-11-13.  Dosages and further duration of the pain medication will be re-evaluated at her post op visit in 2 weeks.  Patient was educated on dosing expectations and limits of prescribing as a result of the new [facility] rules enacted August 31, 2017.  Please also note that this is not the initial opoid prescription issued to this patient but a continuation of medication utilized during the hospital admission as noted in the medical record. [place] report has also been utilized to screen for any abuse history or suspicious activity as outlined in [place] State Law.  All efforts have been taken to prevent abuse potential and misuse of opioid medications including education, screening, and close clinical follow up.

## 2025-03-14 NOTE — PROGRESS NOTES
Physical Therapy    Attempted to see pt this afternoon for second session. Pt in bed and report feeling nauseous and having a headache. Pt declines therapy at this time. Pt feels comfortable with everything reviewed in first session and declines any further questions or concerns about returning home. RN updated at EOS.  Will re-attempt at a later date         Arabella Rich , PT

## 2025-03-14 NOTE — PROGRESS NOTES
functional IND and safety. OT rec home 24/7 SUPV + HHOT. Continue acute OT.   Prognosis: Good  Decision Making: Medium Complexity  REQUIRES OT FOLLOW-UP: Yes  Activity Tolerance  Activity Tolerance: Patient Tolerated treatment well     Plan  Occupational Therapy Plan  Times Per Week: 3-5x/wk  Current Treatment Recommendations: Strengthening, Balance training, Functional mobility training, Endurance training, Positioning, Patient/Caregiver education & training, Safety education & training, Pain management, Self-Care / ADL, Home management training    Restrictions  Restrictions/Precautions  Restrictions/Precautions: Weight Bearing, Contact Precautions  Activity Level: Up as Tolerated  Required Braces or Orthoses?: No  Lower Extremity Weight Bearing Restrictions  Right Lower Extremity Weight Bearing: Weight Bearing As Tolerated  Position Activity Restriction  Other Position/Activity Restrictions: IV, hx of CP/spasticity    Subjective  General  Chart Reviewed: Yes  Patient assessed for rehabilitation services?: Yes  Referring Practitioner: Gt Diaz MD  Diagnosis: Osteoarthritis of right knee, s/p R TKA  Subjective  Subjective: Pt resting in bed on arrival reports \"I'm sleepy\". Pt cagreeable to tx. RN approved.     Social/Functional History  Social/Functional History  Lives With: Son (2 sons ages 15 & 18)  Type of Home: Apartment  Home Layout: One level  Home Access: Level entry  Bathroom Shower/Tub: Tub/Shower unit  Bathroom Toilet: Standard  Bathroom Equipment: Grab bars around toilet  Bathroom Accessibility: Walker accessible  Home Equipment: Grab bars, Cane, Rollator  Has the patient had two or more falls in the past year or any fall with injury in the past year?: Yes (4, balance)  Prior Level of Assist for ADLs: Independent  Prior Level of Assist for Homemaking: Needs assistance (sons complete most)  Prior Level of Assist for Ambulation: Independent household ambulator, with or without device, Independent

## 2025-03-14 NOTE — PLAN OF CARE
Problem: Discharge Planning  Goal: Discharge to home or other facility with appropriate resources  3/13/2025 2207 by Sonali Hurt RN  Outcome: Progressing     Problem: Pain  Goal: Verbalizes/displays adequate comfort level or baseline comfort level  3/13/2025 2207 by Sonali Hurt, RN  Outcome: Progressing  Pt scoring pain on 0-10 scale. Pain medications given per MAR. Pt instructed to call out when pain level increasing. Call light within reach.      Problem: ABCDS Injury Assessment  Goal: Absence of physical injury  3/13/2025 2207 by Sonali Hurt, RN  Outcome: Progressing     Problem: Safety - Adult  Goal: Free from fall injury  Outcome: Progressing  Bed in lowest position, wheels locked, 2/4 side rails up, nonskid footwear on. Bed/ chair check alarm in place, call light within reach. Pt instructed to call out when needing assistance. Pt stated understanding.   
  Problem: Discharge Planning  Goal: Discharge to home or other facility with appropriate resources  Outcome: Progressing     Problem: Pain  Goal: Verbalizes/displays adequate comfort level or baseline comfort level  Outcome: Progressing     Problem: ABCDS Injury Assessment  Goal: Absence of physical injury  Outcome: Progressing     
Encourage patient to ask for pain medication before pain is above a 5 on the 1/10 pain scale.  Medicate before PT or increased activity. Use alternatives such as ice (if ordered) or distraction as often as possible to minimize  need for medication.   Protect patient from fall injury by keeping bed in low position, use of non skid socks and bed alarm system. Keep belongings and call light with reach at all times and following fall protocol.   Monitor for hypotension, signs of infection.       
Increase patients ADLs/functional status to baseline.    
PT eval complete. Increase function to baseline.    
Detail Level: Generalized
Detail Level: Zone
Detail Level: Detailed

## 2025-03-15 NOTE — PROGRESS NOTES
Discharged per wheel chair per PCA Fidencio, verbalized understanding of discharge instructions.  Family member picked up prescriptions at outpatient pharmacy.  Patient states her pain is a comfortable level in her right knee and her nausea has improved.

## 2025-03-17 ENCOUNTER — CARE COORDINATION (OUTPATIENT)
Dept: CASE MANAGEMENT | Age: 45
End: 2025-03-17

## 2025-03-17 ENCOUNTER — TELEPHONE (OUTPATIENT)
Dept: ORTHOPEDIC SURGERY | Age: 45
End: 2025-03-17

## 2025-03-17 NOTE — TELEPHONE ENCOUNTER
Spoke with pt, doing ok.   Reviewed all new medications with patients with instructions on how to take medications. Pt verbalized understanding.   Pt reports eating, drinking, and urinating well.   Incision status: No drainage or redness    Edema/Swelling/Teds: Feels that the swelling isn't that bad, has been icing and elevating.     Pain level and status: Managing pretty well     Use of pain medications: Using every 6 hours, using tylenol.     Blood thinner: Eliquis BID no issues.     Bowels: Moving fine- actually stopped the stool softener because bowels were getting too loose.     Home Care Agency active: NA    Outpatient therapy: Starts Thursday     Do you have all of your medications: Yes    Changes in medications: no    Instructed pt to call Nurse Navigator or surgeon's office with any questions or concerns.     Follow up appointments:    Future Appointments   Date Time Provider Department Center   3/20/2025  3:00 PM Blaire Mcdermott, PT [facility]   3/28/2025 11:15 AM Gt Diaz MD AND [facility]       Lisa Aleman   Orthopedic Nurse Navigator   Phone number: [phone]

## 2025-03-17 NOTE — CARE COORDINATION
Care Transitions Note    Initial Call - Call within 2 business days of discharge: Yes    Attempted to reach patient for transitions of care follow up. Unable to reach .    Outreach Attempts:   HIPAA compliant voicemail left for patient.     Patient: Blaire Hamm    Patient : 1980   MRN: <[id number]>    Reason for Admission: right TKR  Discharge Date: 3/14/25  RURS: No data recorded  Last Discharge Facility       Date Complaint Diagnosis Description Type Department Provider    3/13/25  S/P total knee arthroplasty, left ... Admission (Discharged) Gt Mccabe MD            Was this an external facility discharge? No    Follow Up Appointment:   Patient does not have a follow up appointment scheduled at time of call.  UTR msg pcp pool / consideration  Future Appointments         Provider Specialty Dept Phone    3/20/2025 3:00 PM Blaire Mcdermott, PT Physical Therapy [phone]    3/28/2025 11:15 AM Gt Diaz MD Orthopedic Surgery [phone]            Plan for follow-up on next business day.        Carol Ann Becker RN

## 2025-03-18 ENCOUNTER — CARE COORDINATION (OUTPATIENT)
Dept: CASE MANAGEMENT | Age: 45
End: 2025-03-18

## 2025-03-18 NOTE — CARE COORDINATION
Care Transitions Note    Initial Call - Call within 2 business days of discharge: Yes    Patient Current Location:  Home: [address] [place], [place] [address]katie carlos  [address]  [facility] [address]    Care Transition Nurse contacted the patient by telephone to perform post hospital discharge assessment, verified name and  as identifiers. Provided introduction to self, and explanation of the Care Transition Nurse role.     Patient: Blaire Hamm    Patient : 1980   MRN: <[id number]>    Reason for Admission: Rt TKR  Discharge Date: 3/14/25  RURS: No data recorded    Last Discharge Facility       Date Complaint Diagnosis Description Type Department Provider    3/13/25  S/P total knee arthroplasty, left ... Admission (Discharged) [facility] Gt Diaz MD            Was this an external facility discharge? No    Additional needs identified to be addressed with provider   No needs identified             Method of communication with provider: none.    Patients top risk factors for readmission: functional physical ability, falls, lack of knowledge about disease, medical condition- , medication management, and polypharmacy    Interventions to address risk factors:   Education:    Review of patient management of conditions/medications:    DME:      Care Summary Note:       SUMMARY  CTN spoke to the pt who reports the following:        -Rt TKR completed 3/13:  rates pain 6/10 and last took Roxicodone at 9:15 am - c/o swelling to right knee that is covered in CDI bandage - denies numbness / tingling to right leg / toes and any other issues - encouraged to wear naila hose and elevate when possible - using walker as recommended - Will continue to monitor and f/u with physician as recommended   -Hx of HTN:  does not own BP cuff - offered RPM program and pt is interested and plans to call insurance company and provide billing code to find out if there will be a cost - CTN provided education on importance of monitoring BP prior to taking HTN med - Will

## 2025-03-20 ENCOUNTER — TELEPHONE (OUTPATIENT)
Dept: ORTHOPEDIC SURGERY | Age: 45
End: 2025-03-20

## 2025-03-20 ENCOUNTER — TELEPHONE (OUTPATIENT)
Dept: FAMILY MEDICINE CLINIC | Age: 45
End: 2025-03-20

## 2025-03-20 DIAGNOSIS — Z96.651 S/P TOTAL KNEE ARTHROPLASTY, RIGHT: ICD-10-CM

## 2025-03-20 DIAGNOSIS — M79.89 LEG SWELLING: Primary | ICD-10-CM

## 2025-03-20 RX ORDER — FUROSEMIDE 20 MG/1
20 TABLET ORAL DAILY
Qty: 30 TABLET | Refills: 1 | Status: SHIPPED | OUTPATIENT
Start: 2025-03-20

## 2025-03-20 RX ORDER — OXYCODONE HYDROCHLORIDE 5 MG/1
5 TABLET ORAL EVERY 6 HOURS PRN
Qty: 28 TABLET | Refills: 0 | Status: SHIPPED | OUTPATIENT
Start: 2025-03-20 | End: 2025-03-27

## 2025-03-20 NOTE — TELEPHONE ENCOUNTER
Patient had TKR a week ago and you had taken her of her diuretic, but now her feet are starting swelling again going in to her ankles. The swelling started 2 days ago. She can not get her shoes on to go to physical therapy. She has no SOB. She wants to know if she can back on the diuretic?

## 2025-03-20 NOTE — TELEPHONE ENCOUNTER
Prescription Refill     Medication Name:  OXYCODONE  Pharmacy: [facility]  Patient Contact Number:  [phone]

## 2025-03-24 ENCOUNTER — TELEMEDICINE (OUTPATIENT)
Dept: FAMILY MEDICINE CLINIC | Age: 45
End: 2025-03-24
Payer: COMMERCIAL

## 2025-03-24 ENCOUNTER — TELEPHONE (OUTPATIENT)
Dept: FAMILY MEDICINE CLINIC | Age: 45
End: 2025-03-24

## 2025-03-24 ENCOUNTER — TELEPHONE (OUTPATIENT)
Dept: ORTHOPEDIC SURGERY | Age: 45
End: 2025-03-24

## 2025-03-24 DIAGNOSIS — F31.9 BIPOLAR DISEASE, CHRONIC (HCC): ICD-10-CM

## 2025-03-24 DIAGNOSIS — F41.9 ANXIETY: Primary | ICD-10-CM

## 2025-03-24 DIAGNOSIS — E66.01 MORBID OBESITY WITH BMI OF 45.0-49.9, ADULT: Primary | ICD-10-CM

## 2025-03-24 DIAGNOSIS — F33.1 MDD (MAJOR DEPRESSIVE DISORDER), RECURRENT EPISODE, MODERATE (HCC): Chronic | ICD-10-CM

## 2025-03-24 PROCEDURE — G8427 DOCREV CUR MEDS BY ELIG CLIN: HCPCS | Performed by: NURSE PRACTITIONER

## 2025-03-24 PROCEDURE — 1036F TOBACCO NON-USER: CPT | Performed by: NURSE PRACTITIONER

## 2025-03-24 PROCEDURE — G8417 CALC BMI ABV UP PARAM F/U: HCPCS | Performed by: NURSE PRACTITIONER

## 2025-03-24 PROCEDURE — 99213 OFFICE O/P EST LOW 20 MIN: CPT | Performed by: NURSE PRACTITIONER

## 2025-03-24 RX ORDER — PHENTERMINE HYDROCHLORIDE 37.5 MG/1
37.5 CAPSULE ORAL EVERY MORNING
Qty: 30 CAPSULE | Refills: 0 | Status: SHIPPED | OUTPATIENT
Start: 2025-03-24 | End: 2025-04-23

## 2025-03-24 RX ORDER — ESCITALOPRAM OXALATE 20 MG/1
40 TABLET ORAL DAILY
Qty: 60 TABLET | Refills: 3
Start: 2025-03-24

## 2025-03-24 RX ORDER — LAMOTRIGINE 200 MG/1
200 TABLET ORAL NIGHTLY
Qty: 30 TABLET | Refills: 5 | Status: SHIPPED | OUTPATIENT
Start: 2025-03-24

## 2025-03-24 RX ORDER — DIAZEPAM 5 MG/1
5 TABLET ORAL EVERY 6 HOURS PRN
Qty: 4 TABLET | Refills: 0 | Status: SHIPPED | OUTPATIENT
Start: 2025-03-24 | End: 2025-03-26

## 2025-03-24 RX ORDER — CARIPRAZINE 4.5 MG/1
4.5 CAPSULE, GELATIN COATED ORAL DAILY
Qty: 30 CAPSULE | Refills: 5 | Status: SHIPPED | OUTPATIENT
Start: 2025-03-24

## 2025-03-24 RX ORDER — BUSPIRONE HYDROCHLORIDE 10 MG/1
20 TABLET ORAL 2 TIMES DAILY
Qty: 120 TABLET | Refills: 5 | Status: SHIPPED | OUTPATIENT
Start: 2025-03-24 | End: 2025-09-20

## 2025-03-24 ASSESSMENT — PATIENT HEALTH QUESTIONNAIRE - PHQ9
9. THOUGHTS THAT YOU WOULD BE BETTER OFF DEAD, OR OF HURTING YOURSELF: NOT AT ALL
3. TROUBLE FALLING OR STAYING ASLEEP: NOT AT ALL
8. MOVING OR SPEAKING SO SLOWLY THAT OTHER PEOPLE COULD HAVE NOTICED. OR THE OPPOSITE, BEING SO FIGETY OR RESTLESS THAT YOU HAVE BEEN MOVING AROUND A LOT MORE THAN USUAL: NOT AT ALL
SUM OF ALL RESPONSES TO PHQ QUESTIONS 1-9: 0
SUM OF ALL RESPONSES TO PHQ QUESTIONS 1-9: 0
5. POOR APPETITE OR OVEREATING: NOT AT ALL
SUM OF ALL RESPONSES TO PHQ QUESTIONS 1-9: 0
2. FEELING DOWN, DEPRESSED OR HOPELESS: NOT AT ALL
1. LITTLE INTEREST OR PLEASURE IN DOING THINGS: NOT AT ALL
10. IF YOU CHECKED OFF ANY PROBLEMS, HOW DIFFICULT HAVE THESE PROBLEMS MADE IT FOR YOU TO DO YOUR WORK, TAKE CARE OF THINGS AT HOME, OR GET ALONG WITH OTHER PEOPLE: NOT DIFFICULT AT ALL
4. FEELING TIRED OR HAVING LITTLE ENERGY: NOT AT ALL
7. TROUBLE CONCENTRATING ON THINGS, SUCH AS READING THE NEWSPAPER OR WATCHING TELEVISION: NOT AT ALL
SUM OF ALL RESPONSES TO PHQ QUESTIONS 1-9: 0
6. FEELING BAD ABOUT YOURSELF - OR THAT YOU ARE A FAILURE OR HAVE LET YOURSELF OR YOUR FAMILY DOWN: NOT AT ALL

## 2025-03-24 ASSESSMENT — ENCOUNTER SYMPTOMS
BLOOD IN STOOL: 0
CONSTIPATION: 0
DIARRHEA: 0
CHEST TIGHTNESS: 0
COUGH: 0
SHORTNESS OF BREATH: 0

## 2025-03-24 NOTE — TELEPHONE ENCOUNTER
----- Message from HENRY Sultana CNP sent at 3/24/2025  8:55 AM EDT -----  Please clarify with [place] pharmacy pt Lexparo dose and pend for me to sign.  Thanks.

## 2025-03-24 NOTE — TELEPHONE ENCOUNTER
Per pharmacy she is on 20 mg 2 tablets in the morning, she has refills of 112 tablets  on this and she gets this from her joanne march and these are placed in her pill packs.  Are you changing the dose and taking over prescribing this?

## 2025-03-24 NOTE — TELEPHONE ENCOUNTER
Other    PATIENT CALLING REGARDING HER RT KNEE , PATIENT STATED THAT SHE HAVING SPASMS IN HER RT KNEE CAUSING A LOT OF PAIN      PATIENT HAS TKR ON 3/13/25      PLEASE CALL PATIENT [phone]

## 2025-03-25 ENCOUNTER — HOSPITAL ENCOUNTER (OUTPATIENT)
Dept: PHYSICAL THERAPY | Age: 45
Setting detail: THERAPIES SERIES
Discharge: HOME | End: 2025-03-25
Attending: STUDENT IN AN ORGANIZED HEALTH CARE EDUCATION/TRAINING PROGRAM
Payer: COMMERCIAL

## 2025-03-25 ENCOUNTER — CARE COORDINATION (OUTPATIENT)
Dept: CASE MANAGEMENT | Age: 45
End: 2025-03-25

## 2025-03-25 DIAGNOSIS — M25.561 CHRONIC PAIN OF RIGHT KNEE: Primary | ICD-10-CM

## 2025-03-25 DIAGNOSIS — M62.81 MUSCLE WEAKNESS OF LOWER EXTREMITY: ICD-10-CM

## 2025-03-25 DIAGNOSIS — R26.89 ANTALGIC GAIT: ICD-10-CM

## 2025-03-25 DIAGNOSIS — G89.29 CHRONIC PAIN OF RIGHT KNEE: Primary | ICD-10-CM

## 2025-03-25 DIAGNOSIS — M25.661 STIFFNESS OF KNEE JOINT, RIGHT: ICD-10-CM

## 2025-03-25 PROCEDURE — 97110 THERAPEUTIC EXERCISES: CPT

## 2025-03-25 PROCEDURE — 97140 MANUAL THERAPY 1/> REGIONS: CPT

## 2025-03-25 PROCEDURE — 97161 PT EVAL LOW COMPLEX 20 MIN: CPT

## 2025-03-25 NOTE — CARE COORDINATION
Care Transitions Note    Follow Up Call     Attempted to reach patient for transitions of care follow up.  Unable to reach patient.      Outreach Attempts:   HIPAA compliant voicemail left for patient.     Care Summary Note:     Follow Up Appointment:   Future Appointments         Provider Specialty Dept Phone    3/27/2025 11:00 AM Dinorah Juares PTA Physical Therapy [phone]    3/28/2025 11:15 AM Gt Diaz MD Orthopedic Surgery [phone]            Plan for follow-up call in 11-14 days based on severity of symptoms and risk factors. Plan for next call: symptom management-   self management-   follow-up appointment-   medication management-     Carol Ann Becker RN

## 2025-03-25 NOTE — PLAN OF CARE
[facility] - Outpatient Rehabilitation and Therapy: [address] [place] [address] office: [phone] fax: [phone]     Physical Therapy Initial Evaluation Certification      Dear Gt Diaz MD ,    We had the pleasure of evaluating the following patient for physical therapy services at [facility].  A summary of our findings can be found in the initial assessment below.  This includes our plan of care.  If you have any questions or concerns regarding these findings, please do not hesitate to contact me at the office phone number listed above.  Thank you for the referral.     Physician Signature:_______________________________Date:__________________  By signing above (or electronic signature), therapist’s plan is approved by physician       Physical Therapy: TREATMENT/PROGRESS NOTE   Patient: Blaire Hamm (45 y.o. female)   Examination Date: 2025   :  1980 MRN: [id number]   Visit #: 1   Insurance Allowable Auth Needed   Auth required after 30 visits [x]Yes    []No    Insurance: Payor: [employer] / Plan: [employer] [place] MEDICAID / Product Type: *No Product type* /   Insurance ID: [id number] - (Medicaid Managed)  Secondary Insurance (if applicable):    Treatment Diagnosis:     ICD-10-CM    1. Chronic pain of right knee  M25.561     G89.29       2. Muscle weakness of lower extremity  M62.81       3. Antalgic gait  R26.89       4. Stiffness of knee joint, right  M25.661          Medical Diagnosis: Right Knee OA (M17.11) S/P total knee arthroplasty, left [Z96.652] 3/13/2025   Referring Physician: Gt Diaz MD  PCP: Mallory Sapp APRN - CNP     Plan of care signed (Y/N):     Date of Patient follow up with Physician:      Plan of Care Report: EVAL today  POC update due: (10 visits /OR AUTH LIMITS, whichever is less)  2025                                             Medical

## 2025-03-26 ENCOUNTER — TELEPHONE (OUTPATIENT)
Dept: ORTHOPEDIC SURGERY | Age: 45
End: 2025-03-26

## 2025-03-26 NOTE — TELEPHONE ENCOUNTER
Attempted to contact pt, left voicemail with purpose and call back number.    Lisa Aleman  Orthopedic Nurse Navigator  Phone number: [phone]    Follow up appointments:    Future Appointments   Date Time Provider Department Center   3/27/2025 11:00 AM Blaire Mcdermott, PT [facility] PT [facility]   3/28/2025 11:15 AM Gt Diaz MD AND OR[facility]O [facility]     Signed off from pt.  Instructed pt to call RN or Surgeon's office with any issues or concerns.

## 2025-03-26 NOTE — TELEPHONE ENCOUNTER
General Question     Subject: HOME THERAPY  Patient and /or Facility Request: Blaire Hamm   Contact Number: [phone]     THE PT'S THERAPIST RECOMMENDED THAT SHE HAVE HOME THERAPY BECAUSE SHE'S HAVING A HARD TIME WITH GETTING TRANSPORTATION TO THE OP THERAPY.    PLEASE CALL THE PT BACK WITH THE NUMBER OF A HOME THERAPIST SO THAT SHE CAN GET THAT SET UP.

## 2025-03-27 ENCOUNTER — HOSPITAL ENCOUNTER (OUTPATIENT)
Dept: PHYSICAL THERAPY | Age: 45
Setting detail: THERAPIES SERIES
End: 2025-03-27
Attending: STUDENT IN AN ORGANIZED HEALTH CARE EDUCATION/TRAINING PROGRAM
Payer: COMMERCIAL

## 2025-03-28 ENCOUNTER — OFFICE VISIT (OUTPATIENT)
Dept: ORTHOPEDIC SURGERY | Age: 45
End: 2025-03-28

## 2025-03-28 ENCOUNTER — TELEPHONE (OUTPATIENT)
Dept: ORTHOPEDIC SURGERY | Age: 45
End: 2025-03-28

## 2025-03-28 VITALS — HEIGHT: 65 IN | WEIGHT: 275 LBS | BODY MASS INDEX: 45.82 KG/M2

## 2025-03-28 DIAGNOSIS — Z09 POSTOP CHECK: Primary | ICD-10-CM

## 2025-03-28 DIAGNOSIS — Z96.651 S/P TOTAL KNEE REPLACEMENT, RIGHT: Primary | ICD-10-CM

## 2025-03-28 DIAGNOSIS — Z96.651 S/P TOTAL KNEE REPLACEMENT, RIGHT: ICD-10-CM

## 2025-03-28 PROCEDURE — 99024 POSTOP FOLLOW-UP VISIT: CPT | Performed by: PHYSICIAN ASSISTANT

## 2025-03-28 NOTE — ASSESSMENT & PLAN NOTE
See above    Orders:    busPIRone (BUSPAR) 10 MG tablet; Take 2 tablets by mouth in the morning and at bedtime    lamoTRIgine (LAMICTAL) 200 MG tablet; Take 1 tablet by mouth nightly depression    VRAYLAR 4.5 MG CAPS capsule; Take 1 capsule by mouth daily depression

## 2025-03-28 NOTE — TELEPHONE ENCOUNTER
Other GABBY FROM [facility] CALLING AS A FYI THAT THEY DO NOT SERVICE PATIENTS AREA FOR HOME CARE

## 2025-03-28 NOTE — TELEPHONE ENCOUNTER
General Question     Subject: [facility] ORDER  Patient and /or Facility Request: Blaire Hamm   Contact Number: [phone]     PATIENT CALLED IN TO SEE IF SHE CAN GET AN ORDER FAX OVER TO [facility] FOR HER RT KNEE..    [phone]    PLEASE ADVISE

## 2025-03-28 NOTE — PROGRESS NOTES
Blaire Hamm, was evaluated through a synchronous (real-time) audio-video encounter. The patient (or guardian if applicable) is aware that this is a billable service, which includes applicable co-pays. This Virtual Visit was conducted with patient's (and/or legal guardian's) consent. Patient identification was verified, and a caregiver was present when appropriate.   The patient was located at Home: [address] [place] [address]  Provider was located at Facility (Appt Dept): [address] [place] [address]  Confirm you are appropriately licensed, registered, or certified to deliver care in the [facility] where the patient is located as indicated above. If you are not or unsure, please re-schedule the visit: Yes, I confirm.     Blaire Hamm (:  1980) is a Established patient, presenting virtually for evaluation of the following:      Below is the assessment and plan developed based on review of pertinent history, physical exam, labs, studies, and medications.     Assessment & Plan  Morbid obesity with BMI of 45.0-49.9, adult   Discussed normal findings of weight loss including hair loss.  Discussed multivitamin for hair loss while on medication. Encouraged to monitor her hair loss for now.  Pt verbalized understanding  Continue Phentermine for M#3.  12 lbs weight loss since last OV.  Increase to 37.5 mg  5% weight loss goal 15 lb by 2024 or 281  WC 57 in--> 55 in--> 53.5 in  Encouraged diet/ exercise changes  Negative POCT UPT    Orders:    phentermine 37.5 MG capsule; Take 1 capsule by mouth every morning for 30 days. Max Daily Amount: 37.5 mg    Bipolar disease, chronic (HCC)   Symptomatic but overall controlled  Patient now wishing to receive medications from this office due to interpersonal issues  Continue Buspirone 10 BID, Lamictal 200 daily, Vraylar 2.5.  Pt will need to clarify Lexapro dose with pharmacy.  Encouraged to continue to see support from family and friends.

## 2025-03-28 NOTE — ASSESSMENT & PLAN NOTE
Symptomatic but overall controlled  Patient now wishing to receive medications from this office due to interpersonal issues  Continue Buspirone 10 BID, Lamictal 200 daily, Vraylar 2.5.  Pt will need to clarify Lexapro dose with pharmacy.  Encouraged to continue to see support from family and friends.      Orders:    busPIRone (BUSPAR) 10 MG tablet; Take 2 tablets by mouth in the morning and at bedtime    lamoTRIgine (LAMICTAL) 200 MG tablet; Take 1 tablet by mouth nightly depression    VRAYLAR 4.5 MG CAPS capsule; Take 1 capsule by mouth daily depression

## 2025-03-28 NOTE — ASSESSMENT & PLAN NOTE
Discussed normal findings of weight loss including hair loss.  Discussed multivitamin for hair loss while on medication. Encouraged to monitor her hair loss for now.  Pt verbalized understanding  Continue Phentermine for M#3.  12 lbs weight loss since last OV.  Increase to 37.5 mg  5% weight loss goal 15 lb by 2/2024 or 281  WC 57 in--> 55 in--> 53.5 in  Encouraged diet/ exercise changes  Negative POCT UPT    Orders:    phentermine 37.5 MG capsule; Take 1 capsule by mouth every morning for 30 days. Max Daily Amount: 37.5 mg

## 2025-04-03 ENCOUNTER — CARE COORDINATION (OUTPATIENT)
Dept: CASE MANAGEMENT | Age: 45
End: 2025-04-03

## 2025-04-03 NOTE — PROGRESS NOTES
Date:  3/28/2025    Name:  Blaire Hamm  Address:  [address] [place], [place] [address]katie [place]  [address] [address]    :  1980      Age:   45 y.o.        Chief Complaint    Post-Op Check (Right knee)      History of Present Illness:  Blaire Hamm is a 45 y.o. female who presents for a post-operative check.  This patient is approximately 2 weeks status post a right total knee arthroplasty by Dr. Gt Diaz MD on 2025.  Patient has been adhering to our postoperative protocol and conducting a home exercise program. Over treatment includes; rest, ice, elevation, physical therapy, home exercises, and activity modification. Pain is well-controlled on p.o. pain medication.  Still ambulating with an assistive device.  They deny any signs or symptoms of infection. The patient denies any new injury.  The patient denies any catching, giving way, and joint locking.        Pain Assessment  Location of Pain: Knee  Location Modifiers: Right  Severity of Pain: 6  Quality of Pain: Other (Comment)  Duration of Pain: Other (Comment)  Frequency of Pain: Other (Comment)        Vital Signs:   Ht 1.651 m (5' 5\")   Wt 124.7 kg (275 lb)   BMI 45.76 kg/m²     General Exam:    General: Blaire Hamm is a 45 y.o. year old female who is sitting comfortably in our office in no acute distress.   Neuro: Alert & Oriented x 3,  normal,  no focal deficits noted. Normal mood & affect.  Eyes: Sclera clear  Ears: Normal external ear  Pulm: Respirations unlabored and regular   Skin: Warm, well perfused      Knee Examination: Right    Inspection:  Well-healing surgical incision.  Lower extremity muscle atrophy.  No effusion, ecchymosis, discoloration, erythema, excessive warmth. no gross deformities, no signs of infection.     Palpation: There is no patellofemoral crepitus, there is no joint line tenderness.  No other osseous or soft tissue tenderness.  Negative calf tenderness    Range of Motion: -5-90 degrees     Strength: Grossly intact

## 2025-04-03 NOTE — CARE COORDINATION
Care Transitions Note    Final Call     Attempted to reach patient for transitions of care follow up.  Unable to reach patient.      Outreach Attempts:   HIPAA compliant voicemail left for patient.     Patient graduated from the Care Transitions program on 4/3/25    Patient/family has the ability to self-manage at this time..      Handoff:   Patient was not referred to the ACM team due to unable to contact patient.      Care Summary Note: CT episode closed - CTN signed off pt.      Assessments:  Care Transitions Subsequent and Final Call    Subsequent and Final Calls  Care Transitions Interventions  Other Interventions:              Upcoming Appointments:        Carol Ann Becker RN

## 2025-04-07 ENCOUNTER — TELEPHONE (OUTPATIENT)
Dept: ORTHOPEDIC SURGERY | Age: 45
End: 2025-04-07

## 2025-04-07 NOTE — TELEPHONE ENCOUNTER
Gave pt phone number [facility] pt/ot at home         All records faxed to them they should be reaching out to pt to schedule or she can go there for out pt if closer to home. adelfo

## 2025-04-07 NOTE — TELEPHONE ENCOUNTER
----- Message from Johnnie LEGGETT sent at 4/7/2025  9:47 AM EDT -----  Regarding: Specialty Referral Request  Specialty Referral Request    Reason for referral request: Other HOME HEALTH     Specialist/Lab/Test/DME Item patient is requesting (if known):    Specialist Phone Number (if applicable):    Additional Information: PATIENT CALLING REGARDING HOME HEALTH INFO   --------------------------------------------------------------------------------------------------------------------------    Relationship to Patient: Self    Call Back Info: OK to leave message on voicemail  Preferred Call Back Number:  [phone]

## 2025-04-08 ENCOUNTER — TELEPHONE (OUTPATIENT)
Dept: ORTHOPEDIC SURGERY | Age: 45
End: 2025-04-08

## 2025-04-08 DIAGNOSIS — Z96.651 S/P TOTAL KNEE REPLACEMENT, RIGHT: Primary | ICD-10-CM

## 2025-04-08 NOTE — TELEPHONE ENCOUNTER
Also sent info today for pt to [facility] for home pt/ot and   They do not take her insurance!      Please advise--

## 2025-04-08 NOTE — TELEPHONE ENCOUNTER
[facility].   Would like to have the script sent to [facility] (Someone at [facility] said they do it for her area.)      Notify the patient when this is done.

## 2025-04-09 NOTE — TELEPHONE ENCOUNTER
Lmom to notify pt of scheduling at [facility] location for out pt therapy as home health is not a possibility near to her. Per RBMARITZA. adelfo

## 2025-04-11 ENCOUNTER — PATIENT MESSAGE (OUTPATIENT)
Dept: ORTHOPEDIC SURGERY | Age: 45
End: 2025-04-11

## 2025-04-11 DIAGNOSIS — Z96.651 S/P TOTAL KNEE REPLACEMENT, RIGHT: Primary | ICD-10-CM

## 2025-04-13 ENCOUNTER — APPOINTMENT (OUTPATIENT)
Dept: GENERAL RADIOLOGY | Age: 45
End: 2025-04-13
Payer: COMMERCIAL

## 2025-04-13 ENCOUNTER — APPOINTMENT (OUTPATIENT)
Dept: CT IMAGING | Age: 45
End: 2025-04-13
Payer: COMMERCIAL

## 2025-04-13 ENCOUNTER — HOSPITAL ENCOUNTER (EMERGENCY)
Age: 45
Discharge: HOME | End: 2025-04-13
Attending: EMERGENCY MEDICINE
Payer: COMMERCIAL

## 2025-04-13 VITALS
OXYGEN SATURATION: 98 % | BODY MASS INDEX: 46.95 KG/M2 | RESPIRATION RATE: 18 BRPM | DIASTOLIC BLOOD PRESSURE: 78 MMHG | SYSTOLIC BLOOD PRESSURE: 124 MMHG | HEART RATE: 78 BPM | TEMPERATURE: 98.3 F | WEIGHT: 275 LBS | HEIGHT: 64 IN

## 2025-04-13 DIAGNOSIS — T81.31XA DEHISCENCE OF OPERATIVE WOUND, INITIAL ENCOUNTER: ICD-10-CM
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
## 2025-04-13 DIAGNOSIS — S80.01XA CONTUSION OF RIGHT KNEE, INITIAL ENCOUNTER: ICD-10-CM

## 2025-04-13 PROCEDURE — 73560 X-RAY EXAM OF KNEE 1 OR 2: CPT

## 2025-04-13 PROCEDURE — 99284 EMERGENCY DEPT VISIT MOD MDM: CPT

## 2025-04-13 PROCEDURE — 70450 CT HEAD/BRAIN W/O DYE: CPT

## 2025-04-13 RX ORDER — OXYCODONE AND ACETAMINOPHEN 5; 325 MG/1; MG/1
1 TABLET ORAL EVERY 6 HOURS PRN
Qty: 12 TABLET | Refills: 0 | Status: SHIPPED | OUTPATIENT
Start: 2025-04-13 | End: 2025-04-16

## 2025-04-13 ASSESSMENT — PAIN - FUNCTIONAL ASSESSMENT
PAIN_FUNCTIONAL_ASSESSMENT: 0-10
PAIN_FUNCTIONAL_ASSESSMENT: NONE - DENIES PAIN

## 2025-04-13 ASSESSMENT — PAIN SCALES - GENERAL: PAINLEVEL_OUTOF10: 7

## 2025-04-13 ASSESSMENT — PAIN DESCRIPTION - DESCRIPTORS: DESCRIPTORS: THROBBING

## 2025-04-13 ASSESSMENT — LIFESTYLE VARIABLES
HOW MANY STANDARD DRINKS CONTAINING ALCOHOL DO YOU HAVE ON A TYPICAL DAY: PATIENT DOES NOT DRINK
HOW OFTEN DO YOU HAVE A DRINK CONTAINING ALCOHOL: NEVER

## 2025-04-13 ASSESSMENT — PAIN DESCRIPTION - LOCATION: LOCATION: KNEE

## 2025-04-13 ASSESSMENT — PAIN DESCRIPTION - FREQUENCY: FREQUENCY: INTERMITTENT

## 2025-04-13 ASSESSMENT — PAIN DESCRIPTION - ORIENTATION: ORIENTATION: RIGHT

## 2025-04-13 NOTE — DISCHARGE INSTRUCTIONS
Your head CT and x-rays of both of your knees are negative.  I did discuss with orthopedics and they have reviewed your x-ray.  They are recommending weightbearing as tolerated in the immobilizer and you should call Dr. Diaz on Monday.  We did place Steri-Strips on your wound.  You had a very small area where your incision opened but it is very superficial.  Elevate your leg, continue to take Tylenol as needed.  You can apply ice in 20-minute intervals.  I am giving you a prescription for Percocet for pain.  Please note this has Tylenol in it.  Do not exceed 4000 mg of Tylenol in 24 hours.  The Percocet will make you drowsy.  Do not drive while taking this.  Be cautious with your Klonopin.  Do not take this at the same time as the Percocet.  Do not take the Percocet in addition to other sedating medications or alcohol.

## 2025-04-13 NOTE — ED NOTES
Pt noted with small open area to R knee surgical incision, bleeding controlled. RLE distal circ checks WNL. ROM slightly decreased r/t c/o's pain, denies LOC or further c/o's

## 2025-04-18 ENCOUNTER — OFFICE VISIT (OUTPATIENT)
Dept: ORTHOPEDIC SURGERY | Age: 45
End: 2025-04-18

## 2025-04-18 VITALS — BODY MASS INDEX: 46.95 KG/M2 | WEIGHT: 275 LBS | HEIGHT: 64 IN

## 2025-04-18 DIAGNOSIS — Z96.651 S/P TOTAL KNEE REPLACEMENT, RIGHT: Primary | ICD-10-CM

## 2025-04-18 DIAGNOSIS — M25.561 ACUTE PAIN OF RIGHT KNEE: ICD-10-CM

## 2025-04-18 PROCEDURE — 99024 POSTOP FOLLOW-UP VISIT: CPT | Performed by: STUDENT IN AN ORGANIZED HEALTH CARE EDUCATION/TRAINING PROGRAM

## 2025-04-18 RX ORDER — OXYCODONE HYDROCHLORIDE 5 MG/1
5 TABLET ORAL EVERY 6 HOURS PRN
Qty: 28 TABLET | Refills: 0 | Status: SHIPPED | OUTPATIENT
Start: 2025-04-18 | End: 2025-04-25

## 2025-04-18 RX ORDER — METHOCARBAMOL 750 MG/1
750 TABLET, FILM COATED ORAL 3 TIMES DAILY
COMMUNITY
Start: 2025-04-01

## 2025-04-30 NOTE — PROGRESS NOTES
History: 45-year-old female with history of muscle spasticity and limited mobility secondary to cerebral palsy follows up after right total knee replacement March 13.  She was overall doing well until unfortunately she had a fall on April 13 in which time she presented to the emergency department.  There was no acute damage noted on imaging she was referred for follow-up here today with us.    She continues to use a walker at this time.  She was making progress getting off the walker until the fall.  She continues with muscle relaxers and Tylenol for pain control with oxycodone as needed.  She continues on Eliquis for DVT prophylaxis.     Feels increased stiffness and generalized pain since the fall. No instability. No focal pain.  History of Present Illness    Exam: Incision healing well without erythema or drainage. ROM 10-70. Well controlled effusion and soft tissue swelling. No ligamentous instability and No neurovascular compromise distally.     Physical Exam    Imaging: X-rays of the right knee from April 14 at the time of her fall were reviewed and show stable position of implants    Assessment and Plan    45-year-old female with history of CP and muscle spasticity presents for follow-up after right total knee March 13.  She seems to have a setback in terms of pain control and range of motion due to this recent fall.  She does not seem to have any damage to the bone or implants on imaging or evidence of tendon or ligament injuries on exam.  We will work on improving her pain control getting back in with some physical therapy and follow-up in 3 to 4 weeks.  We do need to carefully monitor her for arthrofibrosis and need for manipulation.    Gt Diaz MD    Assessment & Plan

## 2025-05-01 ENCOUNTER — PATIENT MESSAGE (OUTPATIENT)
Dept: FAMILY MEDICINE CLINIC | Age: 45
End: 2025-05-01

## 2025-05-01 DIAGNOSIS — E66.01 MORBID OBESITY WITH BMI OF 45.0-49.9, ADULT (HCC): ICD-10-CM

## 2025-05-01 RX ORDER — PHENTERMINE HYDROCHLORIDE 37.5 MG/1
37.5 CAPSULE ORAL EVERY MORNING
Qty: 30 CAPSULE | Refills: 0 | Status: SHIPPED | OUTPATIENT
Start: 2025-05-01 | End: 2025-05-31

## 2025-05-27 ENCOUNTER — OFFICE VISIT (OUTPATIENT)
Dept: ORTHOPEDIC SURGERY | Age: 45
End: 2025-05-27

## 2025-05-27 VITALS — WEIGHT: 275 LBS | BODY MASS INDEX: 46.95 KG/M2 | HEIGHT: 64 IN

## 2025-05-27 DIAGNOSIS — Z96.651 S/P TOTAL KNEE REPLACEMENT, RIGHT: Primary | ICD-10-CM

## 2025-05-27 PROCEDURE — 99024 POSTOP FOLLOW-UP VISIT: CPT | Performed by: STUDENT IN AN ORGANIZED HEALTH CARE EDUCATION/TRAINING PROGRAM

## 2025-06-02 DIAGNOSIS — F33.1 MDD (MAJOR DEPRESSIVE DISORDER), RECURRENT EPISODE, MODERATE (HCC): Chronic | ICD-10-CM

## 2025-06-02 RX ORDER — ESCITALOPRAM OXALATE 20 MG/1
TABLET ORAL
Qty: 56 TABLET | Refills: 5 | Status: SHIPPED | OUTPATIENT
Start: 2025-06-02

## 2025-06-05 NOTE — PROGRESS NOTES
History: 45-year-old female with history of muscle spasticity and limited mobility secondary to cerebral palsy follows up after right total knee replacement March 13.  She was overall doing well until unfortunately she had a fall on April 13 in which time she presented to the emergency department.  There was no acute damage noted on imaging she was referred for follow-up here today with us.    She continues to use a walker at this time.  She was making progress getting off the walker until the fall.  She continues with muscle relaxers and Tylenol for pain control with oxycodone as needed.  She continues on Eliquis for DVT prophylaxis.     Feels increased stiffness and generalized pain since the fall. No instability. No focal pain.  History of Present Illness    Exam: Incision healing well without erythema or drainage. ROM . Well controlled effusion and soft tissue swelling. No ligamentous instability and No neurovascular compromise distally.     Physical Exam    Imaging: X-rays of the right knee from April 14 at the time of her fall were reviewed and show stable position of implants    Assessment and Plan    45-year-old female with history of CP and muscle spasticity presents for follow-up after right total knee March 13.  She is mostly limited by distal lateral hamstring tightness and pain at this time. Flexion is improving and I feel extension lag is related to muscle spasticity as opposed to scar tissue and therefore be unlikely to benefit from EMERY. Previously did well with botox injections for adductors through [facility], recommend she asks them about injecting lateral hamstrings as well to work on extension. Will also continue PT and home stretching, f/u 2-3 months.     Gt Diaz MD    Assessment & Plan

## 2025-06-17 DIAGNOSIS — R11.2 NON-INTRACTABLE VOMITING WITH NAUSEA: ICD-10-CM

## 2025-06-17 DIAGNOSIS — K21.00 GASTROESOPHAGEAL REFLUX DISEASE WITH ESOPHAGITIS WITHOUT HEMORRHAGE: ICD-10-CM

## 2025-06-17 RX ORDER — PANTOPRAZOLE SODIUM 40 MG/1
TABLET, DELAYED RELEASE ORAL
Qty: 28 TABLET | Refills: 5 | Status: SHIPPED | OUTPATIENT
Start: 2025-06-17

## 2025-06-17 NOTE — TELEPHONE ENCOUNTER
Refill Request     CONFIRM preferrred pharmacy with the patient.    If Mail Order Rx - Pend for 90 day refill.      Last Seen: Last Seen Department: 3/24/2025  Last Seen by PCP: 3/24/2025    Last Written:     If no future appointment scheduled, route STAFF MESSAGE with patient name to the  Pool for scheduling.      Next Appointment:   Future Appointments   Date Time Provider Department Center   6/23/2025  3:20 PM Mallory Sapp, [facility] - CNP [facility]       Message sent to  to schedule appt with patient?  NO      Requested Prescriptions     Pending Prescriptions Disp Refills    pantoprazole (PROTONIX) 40 MG tablet [Pharmacy Med Name: PANTOPRAZOLE SODIUM 40MG TABLET DR] 28 tablet 5     Sig: TAKE ONE (1) TABLET BY MOUTH EVERY MORNING (BEFORE BREAKFAST)

## 2025-06-25 ENCOUNTER — OFFICE VISIT (OUTPATIENT)
Dept: FAMILY MEDICINE CLINIC | Age: 45
End: 2025-06-25
Payer: COMMERCIAL

## 2025-06-25 VITALS
HEART RATE: 81 BPM | SYSTOLIC BLOOD PRESSURE: 124 MMHG | OXYGEN SATURATION: 96 % | WEIGHT: 268 LBS | DIASTOLIC BLOOD PRESSURE: 74 MMHG | BODY MASS INDEX: 46 KG/M2

## 2025-06-25 DIAGNOSIS — E66.813 CLASS 3 SEVERE OBESITY DUE TO EXCESS CALORIES WITH SERIOUS COMORBIDITY AND BODY MASS INDEX (BMI) OF 45.0 TO 49.9 IN ADULT (HCC): Primary | ICD-10-CM

## 2025-06-25 PROCEDURE — 3074F SYST BP LT 130 MM HG: CPT | Performed by: NURSE PRACTITIONER

## 2025-06-25 PROCEDURE — 1036F TOBACCO NON-USER: CPT | Performed by: NURSE PRACTITIONER

## 2025-06-25 PROCEDURE — 99213 OFFICE O/P EST LOW 20 MIN: CPT | Performed by: NURSE PRACTITIONER

## 2025-06-25 PROCEDURE — 3078F DIAST BP <80 MM HG: CPT | Performed by: NURSE PRACTITIONER

## 2025-06-25 PROCEDURE — G8417 CALC BMI ABV UP PARAM F/U: HCPCS | Performed by: NURSE PRACTITIONER

## 2025-06-25 PROCEDURE — G8427 DOCREV CUR MEDS BY ELIG CLIN: HCPCS | Performed by: NURSE PRACTITIONER

## 2025-06-25 RX ORDER — PHENTERMINE HYDROCHLORIDE 37.5 MG/1
37.5 CAPSULE ORAL EVERY MORNING
Qty: 30 CAPSULE | Refills: 0 | Status: SHIPPED | OUTPATIENT
Start: 2025-06-25 | End: 2025-07-25

## 2025-06-25 RX ORDER — PHENTERMINE HYDROCHLORIDE 37.5 MG/1
37.5 CAPSULE ORAL EVERY MORNING
COMMUNITY
End: 2025-06-25 | Stop reason: SDUPTHER

## 2025-06-25 RX ORDER — BACLOFEN 10 MG/1
10 TABLET ORAL 2 TIMES DAILY
COMMUNITY
Start: 2025-05-21

## 2025-06-25 NOTE — PROGRESS NOTES
6/25/2025    Chief Complaint   Patient presents with    Obesity    Weight Management       Blaire Hamm is a 45 y.o. female, presents today for:      ASSESSMENT/PLAN:    1. Class 3 severe obesity due to excess calories with serious comorbidity and body mass index (BMI) of 45.0 to 49.9 in adult (HCC)    Continue Phentermine for M#7.  8 lbs weight loss since last OV.  Increase to 37.5 mg  5% weight loss goal 15 lb by 2/2024 or 281-->Met New goal 6/2025 255 of 13 lbs.   WC 57 in--> 55 in--> 53.5 in-- 51 6/2025  Encouraged diet/ exercise changes  - phentermine 37.5 MG capsule; Take 1 capsule by mouth every morning for 30 days. Max Daily Amount: 37.5 mg  Dispense: 30 capsule; Refill: 0      Return in about 3 months (around 9/25/2025).      Presenting today for obesity follow up, Phentermine M #6     Still doing well with Phentermine, no side effect. Stopped prior to surgery, has now restarted.  Loss of 8 lbs since last OV.  Trying to increase her exercise as her knee is now feeling better.       Last Weight Metrics:      6/25/2025     3:56 PM 5/27/2025     9:16 AM 4/18/2025    11:38 AM 4/13/2025     9:29 AM 3/28/2025    11:22 AM 3/13/2025     5:15 PM 3/5/2025    12:54 PM   Weight Loss Metrics   Height  5' 4\" 5' 4\" 5' 4\" 5' 5\" 5' 5\" 5' 4\"   Weight - Scale 268 lbs 275 lbs 275 lbs 275 lbs 275 lbs 275 lbs 274 lbs   BMI (Calculated) 0 kg/m2 47.3 kg/m2 47.3 kg/m2 47.3 kg/m2 45.9 kg/m2 45.9 kg/m2 47.1 kg/m2         Lab Results   Component Value Date     03/09/2025    K 3.6 03/09/2025     03/09/2025    CO2 25 03/09/2025    BUN 10 03/09/2025    CREATININE 0.9 03/09/2025    GLUCOSE 107 (H) 03/09/2025    CALCIUM 9.7 03/09/2025    BILITOT <0.2 03/09/2025    ALKPHOS 95 03/09/2025    AST 18 03/09/2025    ALT 19 03/09/2025    LABGLOM 80 03/09/2025    GFRAA >60 09/29/2022    AGRATIO 1.2 03/09/2025    GLOB 3.1 01/27/2021         Review of Systems   Constitutional: Negative.    Respiratory:  Negative for cough, chest

## 2025-07-01 ASSESSMENT — ENCOUNTER SYMPTOMS
CONSTIPATION: 0
BLOOD IN STOOL: 0
SHORTNESS OF BREATH: 0
DIARRHEA: 0
CHEST TIGHTNESS: 0
COUGH: 0

## 2025-07-08 ENCOUNTER — TELEPHONE (OUTPATIENT)
Dept: ORTHOPEDIC SURGERY | Age: 45
End: 2025-07-08

## 2025-07-09 ENCOUNTER — OFFICE VISIT (OUTPATIENT)
Dept: ORTHOPEDIC SURGERY | Age: 45
End: 2025-07-09

## 2025-07-09 DIAGNOSIS — M25.561 ACUTE PAIN OF RIGHT KNEE: Primary | ICD-10-CM

## 2025-07-15 ENCOUNTER — OFFICE VISIT (OUTPATIENT)
Dept: FAMILY MEDICINE CLINIC | Age: 45
End: 2025-07-15
Payer: COMMERCIAL

## 2025-07-15 VITALS
OXYGEN SATURATION: 98 % | WEIGHT: 265 LBS | DIASTOLIC BLOOD PRESSURE: 88 MMHG | BODY MASS INDEX: 45.49 KG/M2 | HEART RATE: 94 BPM | SYSTOLIC BLOOD PRESSURE: 122 MMHG | TEMPERATURE: 97.1 F

## 2025-07-15 DIAGNOSIS — R39.9 UTI SYMPTOMS: Primary | ICD-10-CM

## 2025-07-15 LAB
BILIRUBIN, POC: NORMAL
BLOOD URINE, POC: NORMAL
CLARITY, POC: NORMAL
COLOR, POC: YELLOW
GLUCOSE URINE, POC: NEGATIVE MG/DL
KETONES, POC: NEGATIVE MG/DL
LEUKOCYTE EST, POC: NORMAL
NITRITE, POC: NEGATIVE
PH, POC: 6
PROTEIN, POC: 100 MG/DL
SPECIFIC GRAVITY, POC: 1.02
UROBILINOGEN, POC: 2 MG/DL

## 2025-07-15 PROCEDURE — 1036F TOBACCO NON-USER: CPT | Performed by: NURSE PRACTITIONER

## 2025-07-15 PROCEDURE — G8427 DOCREV CUR MEDS BY ELIG CLIN: HCPCS | Performed by: NURSE PRACTITIONER

## 2025-07-15 PROCEDURE — 81002 URINALYSIS NONAUTO W/O SCOPE: CPT | Performed by: NURSE PRACTITIONER

## 2025-07-15 PROCEDURE — 3074F SYST BP LT 130 MM HG: CPT | Performed by: NURSE PRACTITIONER

## 2025-07-15 PROCEDURE — 99213 OFFICE O/P EST LOW 20 MIN: CPT | Performed by: NURSE PRACTITIONER

## 2025-07-15 PROCEDURE — 3079F DIAST BP 80-89 MM HG: CPT | Performed by: NURSE PRACTITIONER

## 2025-07-15 PROCEDURE — G8417 CALC BMI ABV UP PARAM F/U: HCPCS | Performed by: NURSE PRACTITIONER

## 2025-07-15 RX ORDER — CEPHALEXIN 500 MG/1
500 CAPSULE ORAL 2 TIMES DAILY
Qty: 10 CAPSULE | Refills: 0 | Status: SHIPPED | OUTPATIENT
Start: 2025-07-15 | End: 2025-07-20

## 2025-07-15 NOTE — PROGRESS NOTES
7/15/2025    Chief Complaint   Patient presents with    Urinary Tract Infection     Started on Sunday with pain and frequency and burning        Blaire Hamm is a 45 y.o. female, presents today for:        ASSESSMENT/PLAN:    1. UTI symptoms  2 day symptoms of progressive worsening of frequency, urgency, dysuria.  POCT UA showing Large amount of blood, small leukocytes, protein, indicating likely UTI.  Will send for culture.  Start Keflex.  Encouraged to increased water intake.  May take Pyridium PRN for symptoms relief.    - POCT Urinalysis no Micro  - Urinary Tract Infection Organism and Resistance ID by PCR  - cephALEXin (KEFLEX) 500 MG capsule; Take 1 capsule by mouth 2 times daily for 5 days UTI symptoms  Dispense: 10 capsule; Refill: 0      Return if symptoms worsen or fail to improve.        Presenting today due to UTI for 5 days with urinary frequency, urgency, dysuria.  Symptoms worsened     Cut out caffeine and drinking more water.  Symptoms have now improved.    Last sexual active about 10 days ago.   Last UTI several years ago  No prior renal calculi.    No concern for pregnancy.  Has an IUD     Lab Results   Component Value Date     03/09/2025    K 3.6 03/09/2025     03/09/2025    CO2 25 03/09/2025    BUN 10 03/09/2025    CREATININE 0.9 03/09/2025    GLUCOSE 107 (H) 03/09/2025    CALCIUM 9.7 03/09/2025    BILITOT <0.2 03/09/2025    ALKPHOS 95 03/09/2025    AST 18 03/09/2025    ALT 19 03/09/2025    LABGLOM 80 03/09/2025    GFRAA >60 09/29/2022    AGRATIO 1.2 03/09/2025    GLOB 3.1 01/27/2021         Review of Systems   All other systems reviewed and are negative.      Current Outpatient Medications on File Prior to Visit   Medication Sig Dispense Refill    baclofen (LIORESAL) 10 MG tablet Take 1 tablet by mouth 2 times daily      phentermine 37.5 MG capsule Take 1 capsule by mouth every morning for 30 days. Max Daily Amount: 37.5 mg 30 capsule 0    pantoprazole (PROTONIX) 40 MG tablet

## 2025-07-16 ENCOUNTER — RESULTS FOLLOW-UP (OUTPATIENT)
Dept: FAMILY MEDICINE CLINIC | Age: 45
End: 2025-07-16

## 2025-07-16 DIAGNOSIS — B37.9 ANTIBIOTIC-INDUCED YEAST INFECTION: Primary | ICD-10-CM

## 2025-07-16 DIAGNOSIS — T36.95XA ANTIBIOTIC-INDUCED YEAST INFECTION: Primary | ICD-10-CM

## 2025-07-16 LAB
A BAUMANNII DNA SPEC QL NAA+PROBE: NOT DETECTED
CARBAPENEM RESISTANCE OXA-48 GENE BY PCR: NOT DETECTED
CEPHALOSPORIN RESISTANCE AMPC GENE: NORMAL
ENTEROCOC DNA SPEC QL NAA+PROBE: NOT DETECTED
ESBL RESISTANCE: NOT DETECTED
GP B STREP DNA SPEC QL NAA+PROBE: NORMAL
K PNEUMON DNA SPEC QL NAA+PROBE: NOT DETECTED
MACROLIDE RESISTANCE: NORMAL
METHICILLIN RESISTANCE: NOT DETECTED
MRSA DNA SPEC QL NAA+PROBE: NOT DETECTED
OTHER MICROORG DNA SPEC QL NAA+PROBE: NORMAL
OTHER MICROORG DNA SPEC QL NAA+PROBE: NOT DETECTED
P AERUGINOSA DNA SPEC QL NAA+PROBE: NOT DETECTED
P MIRABILIS DNA SPEC QL NAA+PROBE: NOT DETECTED
QUINOLONE AND FLUOROQUINOLONE RESISTANCE: NOT DETECTED
S PYO RRNA SPEC QL PROBE: NOT DETECTED
TETRACYCLINE RESISTANCE: NORMAL
TRIMETHOPRIM/SULFONAMIDE RESISTANCE: NORMAL

## 2025-07-16 NOTE — RESULT ENCOUNTER NOTE
Urinary testing confirming E. Coli and Strep infection in urine.  Antibiotic should help.  Let me know if it does not please

## 2025-07-25 RX ORDER — FLUCONAZOLE 150 MG/1
150 TABLET ORAL
Qty: 2 TABLET | Refills: 0 | Status: SHIPPED | OUTPATIENT
Start: 2025-07-25 | End: 2025-07-31

## 2025-08-06 DIAGNOSIS — E66.813 CLASS 3 SEVERE OBESITY DUE TO EXCESS CALORIES WITH SERIOUS COMORBIDITY AND BODY MASS INDEX (BMI) OF 45.0 TO 49.9 IN ADULT (HCC): ICD-10-CM

## 2025-08-06 RX ORDER — PHENTERMINE HYDROCHLORIDE 37.5 MG/1
CAPSULE ORAL
Qty: 30 CAPSULE | Refills: 0 | Status: SHIPPED | OUTPATIENT
Start: 2025-08-06 | End: 2025-09-05

## 2025-08-13 DIAGNOSIS — I10 ESSENTIAL HYPERTENSION: ICD-10-CM

## 2025-08-14 RX ORDER — METOPROLOL SUCCINATE 100 MG/1
100 TABLET, EXTENDED RELEASE ORAL DAILY
Qty: 28 TABLET | Refills: 5 | Status: SHIPPED | OUTPATIENT
Start: 2025-08-14

## (undated) DEVICE — SHEET,DRAPE,53X77,STERILE: Brand: MEDLINE

## (undated) DEVICE — SAFEDGE 2108 SERIES SAGITTAL BLADE STERNUM REVISION (40.3 X 0.64 X 48.4MM): Brand: SAFEDGE

## (undated) DEVICE — SOLUTION IRRIG 2000ML 0.9% SOD CHL USP UROMATIC PLAS CONT

## (undated) DEVICE — STERILE PATIENT PROTECTIVE PAD FOR IMP® KNEE POSITIONERS & COHESIVE WRAP (10 / CASE): Brand: DE MAYO KNEE POSITIONER®

## (undated) DEVICE — SUTURE STRATAFIX SYMMETRIC SZ 1 L18IN ABSRB VLT CT1 L36CM SXPP1A404

## (undated) DEVICE — ADHESIVE SKIN CLOSURE WND 8.661X1.5 IN 22 CM LIQUIBAND SECUR

## (undated) DEVICE — GLOVE ORTHO 7 1/2   MSG9475

## (undated) DEVICE — HOOD, PEEL-AWAY: Brand: FLYTE

## (undated) DEVICE — TUBE SUCT L10IN MINI FLTR CRV KAMVAC

## (undated) DEVICE — SUTURE STRATAFIX SPRL SZ 3-0 L12IN ABSRB UD FS-1 L30X30CM SXMP2B410

## (undated) DEVICE — 3 BONE CEMENT MIXER: Brand: MIXEVAC

## (undated) DEVICE — PIN HOLDING HDLSS 3.2X75 MM TROCAR ZUK

## (undated) DEVICE — ZIMMER® A.T.S. CYCLINDRICAL TOURNIQUET CUFF, SINGLE PORT, SINGLE BLADDER 30 IN. 76 CM)

## (undated) DEVICE — SYRINGE 30ML MEDICAL LEUR LOCK TIP WO

## (undated) DEVICE — SOLUTION WND IRRIGATION 450 ML 0.5 PVP-I 0.9 NACL

## (undated) DEVICE — SUTURE ABSORBABLE MONOFILAMENT 1 CTX 45 CM 48 MM DA VIO PDS+

## (undated) DEVICE — BOWL AND CEMENT CARTRIDGE WITH BREAKAWAY FEMORAL NOZZLE AND MEDIUM PRESSURIZER: Brand: ACM

## (undated) DEVICE — Device

## (undated) DEVICE — SYSTEM SKIN CLSR 22CM DERMBND PRINEO

## (undated) DEVICE — NEEDLE HYPO 20GA L1.5IN YEL POLYPR HUB S STL REG BVL STR

## (undated) DEVICE — HOOD: Brand: FLYTE

## (undated) DEVICE — HEADLESS TROCHAR PIN 75MM: Brand: ZUK

## (undated) DEVICE — SUTURE STRATAFIX SPRL SZ 2 0 L14IN ABSRB UD MH L36MM 1 2 CIR SXMD2B401

## (undated) DEVICE — STERILE PVP: Brand: MEDLINE INDUSTRIES, INC.

## (undated) DEVICE — GOWN,REINF,POLY,AURORA,XLNG/XXL,STRL: Brand: MEDLINE

## (undated) DEVICE — GLOVE SURG SZ 75 L12IN FNGR THK79MIL GRN LTX FREE

## (undated) DEVICE — HANDPIECE SET WITH HIGH FLOW TIP AND SUCTION TUBE: Brand: INTERPULSE

## (undated) DEVICE — SUTURE VICRYL + SZ 1 L18IN ABSRB VLT L36MM CT-1 1/2 CIR VCP741D

## (undated) DEVICE — DUAL CUT SAGITTAL BLADE

## (undated) DEVICE — INSTRUMENT SCREW BNE L25MM DIA2.5MM KNEE FULL THRD HEX FEM PERSONA

## (undated) DEVICE — GAUZE,SPONGE,4"X4",8PLY,STRL,LF,10/TRAY: Brand: MEDLINE

## (undated) DEVICE — 2108 SERIES SAGITTAL BLADE FLARED, GROUND  (18.5 X 1.27 X 90.5MM)

## (undated) DEVICE — SUTURE STRATAFIX SPRL SZ 1 L14IN ABSRB VLT L48CM CTX 1/2 SXPD2B405

## (undated) DEVICE — NEEDLE SPNL 20GA L3.5IN YEL HUB S STL REG WALL FIT STYL

## (undated) DEVICE — DRESSING FOAM W4XL4IN SIL FACE BORD ADH PD SUP ABSRB COR

## (undated) DEVICE — SUTURE MONOCRYL STRATAFIX SPRL SZ 3-0 L12IN ABSRB UD FS-1 L30X30CM SXMP2B410

## (undated) DEVICE — OPTIFOAM GENTLE SA, POSTOP, 4X12: Brand: MEDLINE

## (undated) DEVICE — SCREW BNE HD 3.5X48 MM HEX PERSONA (NOT IMPLANTED)

## (undated) DEVICE — PENCIL SMK EVAC ALL IN 1 DSGN ENH VISIBILITY IMPROVED AIR